# Patient Record
Sex: FEMALE | Race: WHITE | NOT HISPANIC OR LATINO | Employment: OTHER | URBAN - METROPOLITAN AREA
[De-identification: names, ages, dates, MRNs, and addresses within clinical notes are randomized per-mention and may not be internally consistent; named-entity substitution may affect disease eponyms.]

---

## 2017-01-16 ENCOUNTER — HOSPITAL ENCOUNTER (OUTPATIENT)
Dept: RADIOLOGY | Facility: MEDICAL CENTER | Age: 67
End: 2017-01-16
Attending: NURSE PRACTITIONER
Payer: MEDICARE

## 2017-01-16 DIAGNOSIS — M81.0 OSTEOPOROSIS: ICD-10-CM

## 2017-01-16 DIAGNOSIS — Z12.31 VISIT FOR SCREENING MAMMOGRAM: ICD-10-CM

## 2017-01-16 PROCEDURE — 77080 DXA BONE DENSITY AXIAL: CPT

## 2017-01-16 PROCEDURE — 77063 BREAST TOMOSYNTHESIS BI: CPT

## 2017-01-17 ENCOUNTER — PATIENT MESSAGE (OUTPATIENT)
Dept: MEDICAL GROUP | Facility: PHYSICIAN GROUP | Age: 67
End: 2017-01-17

## 2017-01-18 ENCOUNTER — PATIENT MESSAGE (OUTPATIENT)
Dept: MEDICAL GROUP | Facility: PHYSICIAN GROUP | Age: 67
End: 2017-01-18

## 2017-01-18 DIAGNOSIS — R06.83 SNORING: ICD-10-CM

## 2017-01-18 NOTE — TELEPHONE ENCOUNTER
From: Cheryl Natarajan  To: BRANDO Garrido  Sent: 1/18/2017 7:55 AM PST  Subject: Non-Urgent Medical Question    Yes please, I'm not sleeping well. all I take is Melatonin, and am told I snore.  Thank youCheryl  ----- Message -----  From: BRANDO Garrido  Sent: 1/18/2017 7:15 AM PST  To: Cheryl Natarajan  Subject: RE: Non-Urgent Medical Question    We would need to put in a referral to pulmonology. They would see you and then order the study. Let me know if you would like me to do that.  BRANDO Garrido      ----- Message -----   From: CHERYL NATARAJAN   Sent: 1/17/2017 2:11 PM PST   To: BRANDO Garrido  Subject: Non-Urgent Medical Question    How would I go about getting a sleep study? Do you need to order that for me?  Thank you,  Cheryl

## 2017-02-03 ENCOUNTER — OFFICE VISIT (OUTPATIENT)
Dept: URGENT CARE | Facility: PHYSICIAN GROUP | Age: 67
End: 2017-02-03
Payer: MEDICARE

## 2017-02-03 VITALS
SYSTOLIC BLOOD PRESSURE: 132 MMHG | DIASTOLIC BLOOD PRESSURE: 70 MMHG | WEIGHT: 215 LBS | BODY MASS INDEX: 31.74 KG/M2 | OXYGEN SATURATION: 98 % | HEART RATE: 108 BPM | TEMPERATURE: 97.8 F

## 2017-02-03 DIAGNOSIS — M54.50 ACUTE RIGHT-SIDED LOW BACK PAIN WITHOUT SCIATICA: ICD-10-CM

## 2017-02-03 PROCEDURE — 1036F TOBACCO NON-USER: CPT | Performed by: NURSE PRACTITIONER

## 2017-02-03 PROCEDURE — 3017F COLORECTAL CA SCREEN DOC REV: CPT | Performed by: NURSE PRACTITIONER

## 2017-02-03 PROCEDURE — 99213 OFFICE O/P EST LOW 20 MIN: CPT | Performed by: NURSE PRACTITIONER

## 2017-02-03 PROCEDURE — G8419 CALC BMI OUT NRM PARAM NOF/U: HCPCS | Performed by: NURSE PRACTITIONER

## 2017-02-03 PROCEDURE — 4040F PNEUMOC VAC/ADMIN/RCVD: CPT | Performed by: NURSE PRACTITIONER

## 2017-02-03 PROCEDURE — G8482 FLU IMMUNIZE ORDER/ADMIN: HCPCS | Performed by: NURSE PRACTITIONER

## 2017-02-03 PROCEDURE — 1101F PT FALLS ASSESS-DOCD LE1/YR: CPT | Performed by: NURSE PRACTITIONER

## 2017-02-03 PROCEDURE — G8432 DEP SCR NOT DOC, RNG: HCPCS | Performed by: NURSE PRACTITIONER

## 2017-02-03 PROCEDURE — 3014F SCREEN MAMMO DOC REV: CPT | Performed by: NURSE PRACTITIONER

## 2017-02-03 ASSESSMENT — ENCOUNTER SYMPTOMS
NECK PAIN: 0
BACK PAIN: 1
WEAKNESS: 0
CHILLS: 0
TINGLING: 0
MYALGIAS: 0
BOWEL INCONTINENCE: 0
LEG PAIN: 0
NUMBNESS: 1
PARESTHESIAS: 0
PERIANAL NUMBNESS: 0
FEVER: 0
FALLS: 0
ABDOMINAL PAIN: 0

## 2017-02-03 NOTE — MR AVS SNAPSHOT
Betty Natarajan   2/3/2017 2:30 PM   Office Visit   MRN: [id number]    Department:  [facility]   Dept Phone:  [phone]    Description:  Female : 1950   Provider:  Cathey J Hamman, A.P.N.           Reason for Visit     Back Pain lower back pain, x3days. had blood work done and kidney function was low       Allergies as of 2/3/2017     Allergen Noted Reactions    Pcn [Penicillins] 2009         You were diagnosed with     Acute right-sided low back pain without sciatica   [[id number]]         Vital Signs     Blood Pressure Pulse Temperature Weight Oxygen Saturation Smoking Status    132/70 mmHg 108 36.6 °C (97.8 °F) 97.523 kg (215 lb) 98% Never Smoker       Basic Information     Date Of Birth Sex Race Ethnicity Preferred Language    1950 Female White Non- English      Your appointments     Mar 17, 2017 10:40 AM   New Patient with C Rotation   [facility] (--)    [address]  [facility] [id number] [phone]           Please bring enclosed paperwork completed along with your insurance card and photo ID.              Problem List              ICD-10-CM Priority Class Noted - Resolved    Vitamin D deficiency E55.9   2010 - Present    Mild intermittent asthma without complication J45.20   2013 - Present    Vitamin D deficiency E55.9   10/29/2014 - Present    Osteoporosis M81.0   12/15/2015 - Present    Obesity (BMI 30-39.9) E66.9   2016 - Present      Health Maintenance        Date Due Completion Dates    PAP SMEAR 10/28/2017 10/28/2014, 2013, 2012, 2012, 2011, 2010    MAMMOGRAM 2018, 2016, 2016, 2013, 10/29/2012, 10/25/2011, 2010, 2010, 2009, 2009, 2008, 2008, 2007, 2006, 10/19/2005, 3/2/2005, 2005    BONE DENSITY 2018, 2016, 2015 (Done), 10/29/2012, 2010    Override on 2015: Done ([facility])    COLONOSCOPY 2/17/2021 2/17/2016    IMM DTaP/Tdap/Td Vaccine (2 - Td) 8/17/2021 8/17/2011, 10/15/2006            Current Immunizations     13-VALENT PCV PREVNAR 12/15/2015    Influenza TIV (IM) 11/29/2012    Influenza Vaccine 12/21/2009    Influenza Vaccine Adult HD 11/16/2016, 12/15/2015    Influenza Vaccine Quad Inj (Pf) 10/28/2014    Pneumococcal Vaccine (UF)Historical Data 12/21/2009    Pneumococcal polysaccharide vaccine (PPSV-23) 10/28/2014    SHINGLES VACCINE 10/28/2014    TD Vaccine 10/15/2006    Tdap Vaccine 8/17/2011      Below and/or attached are the medications your provider expects you to take. Review all of your home medications and newly ordered medications with your provider and/or pharmacist. Follow medication instructions as directed by your provider and/or pharmacist. Please keep your medication list with you and share with your provider. Update the information when medications are discontinued, doses are changed, or new medications (including over-the-counter products) are added; and carry medication information at all times in the event of emergency situations     Allergies:  PCN - (reactions not documented)               Medications  Valid as of: February 03, 2017 -  6:14 PM    Generic Name Brand Name Tablet Size Instructions for use    Albuterol Sulfate (Aero Soln) albuterol 108 (90 BASE) MCG/ACT Inhale 2 Puffs by mouth every 6 hours as needed for Shortness of Breath.        Albuterol Sulfate (Aero Soln) VENTOLIN  (90 BASE) MCG/ACT INHALE 2 PUFFS BY MOUTH EVERY 6 HOURS AS NEEDED FOR SHORTNESS OF BREATH.        Alendronate Sodium (Tab) FOSAMAX 70 MG Take 1 Tab by mouth every 7 days.        Cholecalciferol (Tab) cholecalciferol 1000 UNIT Take 1,000 Units by mouth every day.        Ergocalciferol (Cap) DRISDOL 09125 UNITS Take 1 Cap by mouth every 7 days.        Furosemide (Tab) LASIX 20 MG Take 0.5 Tabs by mouth 2 times a day.        Hydrocod Polst-Chlorphen Polst (Liquid  CR) TUSSIONEX 10-8 MG/5ML Take 5 mL by mouth every 12 hours.        Hydrocodone-Acetaminophen (Tab) NORCO  MG Take 1 Tab by mouth every 6 hours as needed for Mild Pain. No driving or operation of machinery after taking this medication.        Mometasone Furo-Formoterol Fum (Aerosol) Mometasone Furo-Formoterol Fum 200-5 MCG/ACT Inhale  by mouth.        Sertraline HCl (Tab) ZOLOFT 50 MG Take 1 Tab by mouth every day.        ValACYclovir HCl (Tab) VALTREX 1 GM Take 1 Tab by mouth 3 times a day.        .                 Medicines prescribed today were sent to:     [id number], [place] - [address] [place] [address]    Phone: [phone] Fax: [phone]    Open 24 Hours?: No      Medication refill instructions:       If your prescription bottle indicates you have medication refills left, it is not necessary to call your provider’s office. Please contact your pharmacy and they will refill your medication.    If your prescription bottle indicates you do not have any refills left, you may request refills at any time through one of the following ways: The online [employer] system (except Urgent Care), by calling your provider’s office, or by asking your pharmacy to contact your provider’s office with a refill request. Medication refills are processed only during regular business hours and may not be available until the next business day. Your provider may request additional information or to have a follow-up visit with you prior to refilling your medication.   *Please Note: Medication refills are assigned a new Rx number when refilled electronically. Your pharmacy may indicate that no refills were authorized even though a new prescription for the same medication is available at the pharmacy. Please request the medicine by name with the pharmacy before contacting your provider for a refill.           [employer] Access Code: Activation code not generated  Current [employer]  Status: Active

## 2017-02-03 NOTE — PROGRESS NOTES
Subjective:      Betty Natarajan is a 66 y.o. female who presents with Back Pain    Past Medical History   Diagnosis Date   • Depression    • Asthma    • Pap smear      Social History     Social History   • Marital Status:      Spouse Name: N/A   • Number of Children: N/A   • Years of Education: N/A     Occupational History   • retired      Social History Main Topics   • Smoking status: Never Smoker    • Smokeless tobacco: Never Used   • Alcohol Use: 0.0 oz/week     0 Standard drinks or equivalent per week      Comment: occasional   • Drug Use: No   • Sexual Activity: Not on file     Other Topics Concern   • Not on file     Social History Narrative     Family History   Problem Relation Age of Onset   • Diabetes Father    • Hypertension Father    • Genitourinary () Father      renal failure on dialysis   • Cancer Brother      brain tumor   • Cancer Maternal Aunt      Allergies: Pcn    This patient is a 66-year-old female who presents today with complaint of right lower back pain over the last 3 days. Patient is concerned because she is under the impression that her renal function is low. She is concerned about the area of pain and is worried about a problem possibly with her kidney. Patient states that her pain is reproduced with certain ranges of motion including getting in and out of the car and also laying down. She denies any numbness or tingling or radiation of pain into the legs.        Back Pain  This is a new problem. The current episode started in the past 7 days. The problem occurs constantly. The problem is unchanged. Pain location: right lower back. The quality of the pain is described as aching. The pain does not radiate. The pain is moderate. The pain is worse during the night. The symptoms are aggravated by position. Stiffness is present all day. Associated symptoms include numbness. Pertinent negatives include no abdominal pain, bladder incontinence, bowel incontinence, fever, leg pain,  paresthesias, pelvic pain, perianal numbness, tingling or weakness. She has tried nothing for the symptoms. The treatment provided no relief.       Review of Systems   Constitutional: Negative for fever and chills.   Gastrointestinal: Negative for abdominal pain and bowel incontinence.   Genitourinary: Negative for bladder incontinence and pelvic pain.   Musculoskeletal: Positive for back pain. Negative for myalgias, joint pain, falls and neck pain.   Skin: Negative.    Neurological: Positive for numbness. Negative for tingling, weakness and paresthesias.     All other systems reviewed and are negative      Objective:     /70 mmHg  Pulse 108  Temp(Src) 36.6 °C (97.8 °F)  Wt 97.523 kg (215 lb)  SpO2 98%     Physical Exam   Constitutional: She is oriented to person, place, and time. She appears well-developed and well-nourished. No distress.   Musculoskeletal:        Lumbar back: She exhibits decreased range of motion, tenderness, pain and spasm.        Back:    There is no point tenderness to the right lower back. Pain is reproduced to the right lower back with straight leg raise on the left side. Straight leg raise on the right side is negative.   Neurological: She is alert and oriented to person, place, and time.   Skin: Skin is warm and dry. She is not diaphoretic.   Psychiatric: She has a normal mood and affect. Her behavior is normal.   Vitals reviewed.    UA: trace leukocytes; patient denies urgency, frequency or dysuria.               Assessment/Plan:   Lower back pain  Low back strain  -ibuprofen  -Declined RX for muscle relaxer   -ice/heat; always end with ice  -rest  -follow up if sypmtoms persist or worsen   There are no diagnoses linked to this encounter.

## 2017-03-17 ENCOUNTER — SLEEP CENTER VISIT (OUTPATIENT)
Dept: SLEEP MEDICINE | Facility: MEDICAL CENTER | Age: 67
End: 2017-03-17
Payer: MEDICARE

## 2017-03-17 VITALS
BODY MASS INDEX: 34.66 KG/M2 | HEIGHT: 69 IN | RESPIRATION RATE: 16 BRPM | OXYGEN SATURATION: 95 % | HEART RATE: 59 BPM | DIASTOLIC BLOOD PRESSURE: 80 MMHG | WEIGHT: 234 LBS | TEMPERATURE: 98.2 F | SYSTOLIC BLOOD PRESSURE: 112 MMHG

## 2017-03-17 DIAGNOSIS — J45.20 MILD INTERMITTENT ASTHMA WITHOUT COMPLICATION: ICD-10-CM

## 2017-03-17 DIAGNOSIS — G47.33 OBSTRUCTIVE SLEEP APNEA: ICD-10-CM

## 2017-03-17 PROCEDURE — 99202 OFFICE O/P NEW SF 15 MIN: CPT | Performed by: INTERNAL MEDICINE

## 2017-03-17 RX ORDER — OMEGA-3/DHA/EPA/FISH OIL 1000 MG
CAPSULE ORAL DAILY
COMMUNITY
Start: 2017-03-17

## 2017-03-17 RX ORDER — ZOLPIDEM TARTRATE 5 MG/1
5 TABLET ORAL NIGHTLY PRN
Qty: 3 TAB | Refills: 0 | Status: SHIPPED | OUTPATIENT
Start: 2017-03-17 | End: 2017-05-17

## 2017-03-17 NOTE — MR AVS SNAPSHOT
"Betty Natarajan   3/17/2017 10:40 AM   Sleep Center Visit   MRN: [id number]    Department:  [facility]   Dept Phone:  [phone]    Description:  Female : 1950   Provider:  Yogi Ohara M.D.           Reason for Visit     New Patient Evaluate STEVEN      Allergies as of 3/17/2017     Allergen Noted Reactions    Pcn [Penicillins] 2009         You were diagnosed with     Obstructive sleep apnea   [[id number]]       Mild intermittent asthma without complication   [656612]       BMI 34.0-34.9,adult   [072906]         Vital Signs     Blood Pressure Pulse Temperature Respirations Height Weight    112/80 mmHg 59 36.8 °C (98.2 °F) 16 1.753 m (5' 9.02\") 106.142 kg (234 lb)    Body Mass Index Oxygen Saturation Smoking Status             34.54 kg/m2 95% Passive Smoke Exposure - Never Smoker         Basic Information     Date Of Birth Sex Race Ethnicity Preferred Language    1950 Female White Non- English      Your appointments     2017  7:10 PM   Sleep Study Diagnostic with SLEEP TECH   [facility] (--)    [address]  [place] [id number] [phone]            May 05, 2017 10:40 AM   Follow UP with BRANDO Aceves   [facility] (--)    [address]  [place] [id number] [phone]              Problem List              ICD-10-CM Priority Class Noted - Resolved    Vitamin D deficiency E55.9   2010 - Present    Mild intermittent asthma without complication J45.20   2013 - Present    Vitamin D deficiency E55.9   10/29/2014 - Present    Osteoporosis M81.0   12/15/2015 - Present    Obesity (BMI 30-39.9) E66.9   2016 - Present      Health Maintenance        Date Due Completion Dates    PAP SMEAR 10/28/2017 10/28/2014, 2013, 2012, 2012, 2011, 2010    MAMMOGRAM 2018, 2016, 2016, 2013, 10/29/2012, 10/25/2011, 2010, " 8/9/2010, 4/23/2009, 4/23/2009, 4/21/2008, 4/21/2008, 4/20/2007, 4/19/2006, 10/19/2005, 3/2/2005, 2/18/2005    BONE DENSITY 1/16/2018 1/16/2017, 1/6/2016, 1/30/2015 (Done), 10/29/2012, 8/9/2010    Override on 1/30/2015: Done ([facility])    COLONOSCOPY 2/17/2021 2/17/2016    IMM DTaP/Tdap/Td Vaccine (2 - Td) 8/17/2021 8/17/2011, 10/15/2006            Current Immunizations     13-VALENT PCV PREVNAR 12/15/2015    Influenza TIV (IM) 11/29/2012    Influenza Vaccine 12/21/2009    Influenza Vaccine Adult HD 11/16/2016, 12/15/2015    Influenza Vaccine Quad Inj (Pf) 10/28/2014    Pneumococcal Vaccine (UF)Historical Data 12/21/2009    Pneumococcal polysaccharide vaccine (PPSV-23) 10/28/2014    SHINGLES VACCINE 10/28/2014    TD Vaccine 10/15/2006    Tdap Vaccine 8/17/2011      Below and/or attached are the medications your provider expects you to take. Review all of your home medications and newly ordered medications with your provider and/or pharmacist. Follow medication instructions as directed by your provider and/or pharmacist. Please keep your medication list with you and share with your provider. Update the information when medications are discontinued, doses are changed, or new medications (including over-the-counter products) are added; and carry medication information at all times in the event of emergency situations     Allergies:  PCN - (reactions not documented)               Medications  Valid as of: March 17, 2017 - 11:16 AM    Generic Name Brand Name Tablet Size Instructions for use    Albuterol Sulfate (Aero Soln) albuterol 108 (90 BASE) MCG/ACT Inhale 2 Puffs by mouth every 6 hours as needed for Shortness of Breath.        Albuterol Sulfate (Aero Soln) VENTOLIN  (90 BASE) MCG/ACT INHALE 2 PUFFS BY MOUTH EVERY 6 HOURS AS NEEDED FOR SHORTNESS OF BREATH.        Alendronate Sodium (Tab) FOSAMAX 70 MG Take 1 Tab by mouth every 7 days.        Cholecalciferol (Tab) cholecalciferol 1000 UNIT  Take 1,000 Units by mouth every day.        Ergocalciferol (Cap) DRISDOL 90910 UNITS Take 1 Cap by mouth every 7 days.        Furosemide (Tab) LASIX 20 MG Take 0.5 Tabs by mouth 2 times a day.        Hydrocod Polst-Chlorphen Polst (Liquid CR) TUSSIONEX 10-8 MG/5ML Take 5 mL by mouth every 12 hours.        Hydrocodone-Acetaminophen (Tab) NORCO  MG Take 1 Tab by mouth every 6 hours as needed for Mild Pain. No driving or operation of machinery after taking this medication.        Melatonin (Cap) Melatonin 5 MG Take  by mouth at bedtime as needed.        Mometasone Furo-Formoterol Fum (Aerosol) Mometasone Furo-Formoterol Fum 200-5 MCG/ACT Inhale  by mouth.        Probiotic Product (Cap) PROBIOTIC ACIDOPHILUS  Take  by mouth every day.        Sertraline HCl (Tab) ZOLOFT 50 MG Take 1 Tab by mouth every day.        ValACYclovir HCl (Tab) VALTREX 1 GM Take 1 Tab by mouth 3 times a day.        Zolpidem Tartrate (Tab) AMBIEN 5 MG Take 1 Tab by mouth at bedtime as needed for Sleep (1 to 3 po qhs prn insomnia/sleep study. Bring to sleep study.).        .                 Medicines prescribed today were sent to:     [facility] # [address] [place] [address], [place] - [address] [place] [address]    Phone: [phone] Fax: [phone]    Open 24 Hours?: No      Medication refill instructions:       If your prescription bottle indicates you have medication refills left, it is not necessary to call your provider’s office. Please contact your pharmacy and they will refill your medication.    If your prescription bottle indicates you do not have any refills left, you may request refills at any time through one of the following ways: The online [employer] system (except Urgent Care), by calling your provider’s office, or by asking your pharmacy to contact your provider’s office with a refill request. Medication refills are processed only during regular business hours and may not be available until the next  business day. Your provider may request additional information or to have a follow-up visit with you prior to refilling your medication.   *Please Note: Medication refills are assigned a new Rx number when refilled electronically. Your pharmacy may indicate that no refills were authorized even though a new prescription for the same medication is available at the pharmacy. Please request the medicine by name with the pharmacy before contacting your provider for a refill.        Your To Do List     Future Labs/Procedures Complete By Expires    POLYSOMNOGRAPHY, 4 OR MORE  As directed 3/17/2018         [employer] Access Code: Activation code not generated  Current [employer] Status: Active

## 2017-03-17 NOTE — PROGRESS NOTES
"Chief Complaint   Patient presents with   • New Patient     Evaluate STEVEN       HPI:  The patient is a 66-year-old woman who presents for evaluation of possible obstructive sleep apnea. She does admit to sometimes \"dragging\" during the day. She has been told that she snores significantly. No choking during sleep. She is not sure if she has ever had apneic episodes. She does not feel well rested after sleep. Her Wiggins sleepiness score is 13. There is nothing to suggest narcolepsy or movement disorder. She does have mild asthma is under good control.    Past Medical History   Diagnosis Date   • Depression    • Asthma    • Pap smear    • Bronchitis    • Positive PPD    • Influenza    • Tonsillitis        ROS:   Constitutional: Denies fevers, chills, night sweats, fatigue or weight loss  Eyes: Denies vision loss, pain, drainage, double vision  Ears, Nose, Throat: Denies earache, tinnitus, hoarseness  Cardiovascular: Denies chest pain, tightness, palpitations  Respiratory: Denies shortness of breath, sputum production, cough, hemoptysis  Sleep: See history of present illness  GI: Denies abdominal pain, nausea, vomiting, diarrhea  : Denies frequent urination, hematuria, painful urination  Musculoskeletal: Denies back pain, painful joints, sore muscles  Neurological: Denies headaches, seizures  Skin: Denies rashes, color changes  Psychiatric: Denies depression or thoughts of suicide  Hematologic: Denies bleeding tendency or clotting tendency  Allergic/Immunologic: Denies rhinitis, skin sensitivity    Social History     Social History   • Marital Status:      Spouse Name: N/A   • Number of Children: N/A   • Years of Education: N/A     Occupational History   • retired      Social History Main Topics   • Smoking status: Passive Smoke Exposure - Never Smoker   • Smokeless tobacco: Never Used   • Alcohol Use: 0.0 oz/week     0 Standard drinks or equivalent per week      Comment: occasional   • Drug Use: No   • Sexual " "Activity: Not on file     Other Topics Concern   • Not on file     Social History Narrative     Pcn  Current Outpatient Prescriptions on File Prior to Visit   Medication Sig Dispense Refill   • Mometasone Furo-Formoterol Fum (DULERA) 200-5 MCG/ACT Aerosol Inhale  by mouth.     • vitamin D (CHOLECALCIFEROL) 1000 UNIT TABS Take 1,000 Units by mouth every day.     • albuterol (VENTOLIN OR PROVENTIL) 108 (90 BASE) MCG/ACT AERS Inhale 2 Puffs by mouth every 6 hours as needed for Shortness of Breath. 1 Inhaler 3   • furosemide (LASIX) 20 MG Tab Take 0.5 Tabs by mouth 2 times a day. (Patient not taking: Reported on 3/17/2017) 15 Tab 0   • sertraline (ZOLOFT) 50 MG Tab Take 1 Tab by mouth every day. (Patient not taking: Reported on 3/17/2017) 30 Tab 5   • alendronate (FOSAMAX) 70 MG Tab Take 1 Tab by mouth every 7 days. (Patient not taking: Reported on 3/17/2017) 4 Tab 11   • hydrocod polst-chlorphen polst (TUSSIONEX PENNKINETIC ER) 10-8 MG/5ML LQCR Take 5 mL by mouth every 12 hours. (Patient not taking: Reported on 3/2/2016) 140 mL 0   • vitamin D, Ergocalciferol, (DRISDOL) 54159 UNITS CAPS capsule Take 1 Cap by mouth every 7 days. (Patient not taking: Reported on 3/17/2017) 4 Cap 2   • valacyclovir (VALTREX) 1 GM TABS Take 1 Tab by mouth 3 times a day. (Patient not taking: Reported on 3/2/2016) 21 Each 0   • hydrocodone/acetaminophen (NORCO)  MG TABS Take 1 Tab by mouth every 6 hours as needed for Mild Pain. No driving or operation of machinery after taking this medication. (Patient not taking: Reported on 3/2/2016) 15 Each 0   • VENTOLIN  (90 BASE) MCG/ACT AERS INHALE 2 PUFFS BY MOUTH EVERY 6 HOURS AS NEEDED FOR SHORTNESS OF BREATH. (Patient not taking: Reported on 3/17/2017) 18 g 3     No current facility-administered medications on file prior to visit.     Blood pressure 112/80, pulse 59, temperature 36.8 °C (98.2 °F), resp. rate 16, height 1.753 m (5' 9.02\"), weight 106.142 kg (234 lb), SpO2 95 %.  Family " History   Problem Relation Age of Onset   • Diabetes Father    • Hypertension Father    • Genitourinary () Father      renal failure on dialysis   • Cancer Brother      brain tumor   • Cancer Maternal Aunt    • Breast Cancer Daughter    • Sleep Apnea Neg Hx    • Heart Attack Brother    • No Known Problems Brother    • No Known Problems Brother        Physical Exam:    HEENT: PERRLA, EOMI, no scleral icterus, no nasal or oral lesions  Neck: No thyromegaly, no adenopathy, no bruits  Mallampatti: Grade IV  Lungs: Equal breath sounds, no wheezes or crackles  Heart: Regular rate and rhythm, no gallops or murmurs  Abdomen: Soft, benign, no organomegaly  Extremities: No clubbing, cyanosis, or edema  Neurologic: Cranial nerve, motor, and sensory exam are normal    1. Obstructive sleep apnea    2. Mild intermittent asthma without complication    3. BMI 34.0-34.9,adult        The patient has a constellation of signs and symptoms consistent with the diagnosis of obstructive sleep apnea with poor sleep quality, snoring, and excessive daytime somnolence.  The patient is a candidate for polysomnography. Testing will be arranged. We discussed the pathophysiology of obstructive sleep apnea, various therapies, and the nature of the split-night polysomnogram. The patient will use Ambien on the night of the study.  We will see the patient afterwards to review results and suggest any possible therapy.

## 2017-04-01 ENCOUNTER — OFFICE VISIT (OUTPATIENT)
Dept: URGENT CARE | Facility: PHYSICIAN GROUP | Age: 67
End: 2017-04-01
Payer: MEDICARE

## 2017-04-01 ENCOUNTER — HOSPITAL ENCOUNTER (OUTPATIENT)
Dept: RADIOLOGY | Facility: MEDICAL CENTER | Age: 67
End: 2017-04-01
Attending: NURSE PRACTITIONER
Payer: MEDICARE

## 2017-04-01 VITALS
TEMPERATURE: 97.9 F | DIASTOLIC BLOOD PRESSURE: 80 MMHG | RESPIRATION RATE: 16 BRPM | OXYGEN SATURATION: 99 % | WEIGHT: 215 LBS | BODY MASS INDEX: 31.84 KG/M2 | SYSTOLIC BLOOD PRESSURE: 148 MMHG | HEIGHT: 69 IN | HEART RATE: 88 BPM

## 2017-04-01 DIAGNOSIS — R05.8 NONPRODUCTIVE COUGH: ICD-10-CM

## 2017-04-01 DIAGNOSIS — J45.31 MILD PERSISTENT ASTHMA WITH ACUTE EXACERBATION: ICD-10-CM

## 2017-04-01 DIAGNOSIS — J20.9 ACUTE BRONCHITIS, UNSPECIFIED ORGANISM: ICD-10-CM

## 2017-04-01 PROCEDURE — 99214 OFFICE O/P EST MOD 30 MIN: CPT | Performed by: NURSE PRACTITIONER

## 2017-04-01 PROCEDURE — 3014F SCREEN MAMMO DOC REV: CPT | Performed by: NURSE PRACTITIONER

## 2017-04-01 PROCEDURE — 1101F PT FALLS ASSESS-DOCD LE1/YR: CPT | Mod: 8P | Performed by: NURSE PRACTITIONER

## 2017-04-01 PROCEDURE — G8419 CALC BMI OUT NRM PARAM NOF/U: HCPCS | Performed by: NURSE PRACTITIONER

## 2017-04-01 PROCEDURE — G8432 DEP SCR NOT DOC, RNG: HCPCS | Performed by: NURSE PRACTITIONER

## 2017-04-01 PROCEDURE — 1036F TOBACCO NON-USER: CPT | Performed by: NURSE PRACTITIONER

## 2017-04-01 PROCEDURE — 4040F PNEUMOC VAC/ADMIN/RCVD: CPT | Performed by: NURSE PRACTITIONER

## 2017-04-01 PROCEDURE — 71020 DX-CHEST-2 VIEWS: CPT

## 2017-04-01 RX ORDER — IPRATROPIUM BROMIDE AND ALBUTEROL SULFATE 2.5; .5 MG/3ML; MG/3ML
3 SOLUTION RESPIRATORY (INHALATION) ONCE
Status: COMPLETED | OUTPATIENT
Start: 2017-04-01 | End: 2017-04-01

## 2017-04-01 RX ORDER — DOXYCYCLINE HYCLATE 100 MG
100 TABLET ORAL 2 TIMES DAILY
Qty: 14 TAB | Refills: 0 | Status: SHIPPED | OUTPATIENT
Start: 2017-04-01 | End: 2017-05-05

## 2017-04-01 RX ORDER — AZITHROMYCIN 250 MG/1
TABLET, FILM COATED ORAL
Qty: 6 TAB | Refills: 0 | Status: CANCELLED | OUTPATIENT
Start: 2017-04-01

## 2017-04-01 RX ADMIN — IPRATROPIUM BROMIDE AND ALBUTEROL SULFATE 3 ML: 2.5; .5 SOLUTION RESPIRATORY (INHALATION) at 11:21

## 2017-04-01 ASSESSMENT — ENCOUNTER SYMPTOMS
PALPITATIONS: 0
ORTHOPNEA: 0
DIARRHEA: 0
SORE THROAT: 0
ABDOMINAL PAIN: 0
SPUTUM PRODUCTION: 1
NAUSEA: 0
VOMITING: 0
COUGH: 1
DIZZINESS: 0
WEAKNESS: 0
SHORTNESS OF BREATH: 0
CONSTIPATION: 0
CHILLS: 0
HEADACHES: 0
FEVER: 0
BACK PAIN: 0
WHEEZING: 1
MYALGIAS: 0

## 2017-04-01 NOTE — PROGRESS NOTES
Subjective:      Betty Natarajan is a 66 y.o. female who presents with Cough            Cough  Associated symptoms include wheezing. Pertinent negatives include no chest pain, chills, ear pain, fever, headaches, myalgias, sore throat or shortness of breath. There is no history of environmental allergies.   Betty is a 66 year old female who is here for cough x 2 weeks. Denies fever. C/o intermittent productive cough. Using inhaler 2x/day, has not used it in last year. Denies sore throat but has slight nasal congestion with PND. States chest tightness and wheeze, last inhaler use last night. No cough syrup, no nasal flushing.    PMH:  has a past medical history of Depression; Asthma; Pap smear; Bronchitis; Positive PPD; Influenza; and Tonsillitis.  MEDS:   Current outpatient prescriptions:   •  Melatonin 5 MG Cap, Take  by mouth at bedtime as needed., Disp: , Rfl:   •  Probiotic Product (PROBIOTIC ACIDOPHILUS) Cap, Take  by mouth every day., Disp: , Rfl:   •  zolpidem (AMBIEN) 5 MG Tab, Take 1 Tab by mouth at bedtime as needed for Sleep (1 to 3 po qhs prn insomnia/sleep study. Bring to sleep study.)., Disp: 3 Tab, Rfl: 0  •  Mometasone Furo-Formoterol Fum (DULERA) 200-5 MCG/ACT Aerosol, Inhale  by mouth., Disp: , Rfl:   •  furosemide (LASIX) 20 MG Tab, Take 0.5 Tabs by mouth 2 times a day. (Patient not taking: Reported on 3/17/2017), Disp: 15 Tab, Rfl: 0  •  sertraline (ZOLOFT) 50 MG Tab, Take 1 Tab by mouth every day. (Patient not taking: Reported on 3/17/2017), Disp: 30 Tab, Rfl: 5  •  alendronate (FOSAMAX) 70 MG Tab, Take 1 Tab by mouth every 7 days. (Patient not taking: Reported on 3/17/2017), Disp: 4 Tab, Rfl: 11  •  hydrocod polst-chlorphen polst (TUSSIONEX PENNKINETIC ER) 10-8 MG/5ML LQCR, Take 5 mL by mouth every 12 hours. (Patient not taking: Reported on 3/2/2016), Disp: 140 mL, Rfl: 0  •  vitamin D, Ergocalciferol, (DRISDOL) 61885 UNITS CAPS capsule, Take 1 Cap by mouth every 7 days. (Patient not taking:  Reported on 3/17/2017), Disp: 4 Cap, Rfl: 2  •  valacyclovir (VALTREX) 1 GM TABS, Take 1 Tab by mouth 3 times a day. (Patient not taking: Reported on 3/2/2016), Disp: 21 Each, Rfl: 0  •  hydrocodone/acetaminophen (NORCO)  MG TABS, Take 1 Tab by mouth every 6 hours as needed for Mild Pain. No driving or operation of machinery after taking this medication. (Patient not taking: Reported on 3/2/2016), Disp: 15 Each, Rfl: 0  •  VENTOLIN  (90 BASE) MCG/ACT AERS, INHALE 2 PUFFS BY MOUTH EVERY 6 HOURS AS NEEDED FOR SHORTNESS OF BREATH. (Patient not taking: Reported on 3/17/2017), Disp: 18 g, Rfl: 3  •  vitamin D (CHOLECALCIFEROL) 1000 UNIT TABS, Take 1,000 Units by mouth every day., Disp: , Rfl:   •  albuterol (VENTOLIN OR PROVENTIL) 108 (90 BASE) MCG/ACT AERS, Inhale 2 Puffs by mouth every 6 hours as needed for Shortness of Breath., Disp: 1 Inhaler, Rfl: 3    Current facility-administered medications:   •  ipratropium-albuterol (DUONEB) nebulizer solution 3 mL, 3 mL, Nebulization, Once, Gema Lira, F.N.P.  ALLERGIES:   Allergies   Allergen Reactions   • Pcn [Penicillins]      SURGHX:   Past Surgical History   Procedure Laterality Date   • Hemorrhoidectomy  5/08   • Nodule excision       Performed by EDELMIRA MONTE at SURGERY [facility] ORS   • Gastric bypass laparoscopic  2001   • Colonoscopy  2005     normal   • Us-needle core bx-breast panel     • Colonoscopy  2/17/2016     internal hemorrhoids   • Pr remv 2nd cataract,corn-scler sectn     • Tonsillectomy       SOCHX:  reports that she has been passively smoking.  She has never used smokeless tobacco. She reports that she drinks alcohol. She reports that she does not use illicit drugs.  FH: Family history was reviewed, no pertinent findings to report      Review of Systems   Constitutional: Negative for fever, chills and malaise/fatigue.   HENT: Positive for congestion. Negative for ear pain and sore throat.    Respiratory: Positive for cough, sputum  "production and wheezing. Negative for shortness of breath.    Cardiovascular: Negative for chest pain, palpitations and orthopnea.   Gastrointestinal: Negative for nausea, vomiting, abdominal pain, diarrhea and constipation.   Musculoskeletal: Negative for myalgias and back pain.   Neurological: Negative for dizziness, weakness and headaches.   Endo/Heme/Allergies: Negative for environmental allergies.   All other systems reviewed and are negative.         Objective:     /80 mmHg  Pulse 88  Temp(Src) 36.6 °C (97.9 °F)  Resp 16  Ht 1.753 m (5' 9\")  Wt 97.523 kg (215 lb)  BMI 31.74 kg/m2  SpO2 95%     Physical Exam   Constitutional: She is oriented to person, place, and time. Vital signs are normal. She appears well-developed and well-nourished. She is cooperative.  Non-toxic appearance. She does not have a sickly appearance. She does not appear ill. No distress.   HENT:   Head: Normocephalic.   Right Ear: Hearing, tympanic membrane, external ear and ear canal normal.   Left Ear: Hearing, tympanic membrane, external ear and ear canal normal.   Nose: No mucosal edema, rhinorrhea or sinus tenderness.   Mouth/Throat: Uvula is midline and mucous membranes are normal. No uvula swelling. Posterior oropharyngeal erythema present.   Eyes: Conjunctivae and EOM are normal. Pupils are equal, round, and reactive to light.   Neck: Normal range of motion. Neck supple.   Cardiovascular: Normal rate and regular rhythm.    Pulmonary/Chest: Effort normal. No accessory muscle usage. No respiratory distress. She has no decreased breath sounds. She has wheezes in the right upper field, the right middle field and the right lower field. She has no rhonchi. She has no rales.   Musculoskeletal: Normal range of motion.   Lymphadenopathy:     She has no cervical adenopathy.   Neurological: She is alert and oriented to person, place, and time.   Skin: Skin is warm and dry. She is not diaphoretic.   Vitals reviewed.  Duo Neb " breathing treatment: Patient has chest tightness, wheeze without SOB or CP. Cough induced especially with deep breathing. After, patient states able to breathe deep without coughing. Air movement throughout. Post tx 99%.    CXR:      FINDINGS:  Cardiomediastinal contours are normal.  No pulmonary consolidation is seen.  No pleural space process is evident.                  Assessment/Plan:     1. Nonproductive cough    - ipratropium-albuterol (DUONEB) nebulizer solution 3 mL; 3 mL by Nebulization route Once.  - DX-CHEST-2 VIEWS; Future    2. Mild persistent asthma with acute exacerbation    - ipratropium-albuterol (DUONEB) nebulizer solution 3 mL; 3 mL by Nebulization route Once.  - DX-CHEST-2 VIEWS; Future    3. Acute bronchitis, unspecified organism    - doxycycline (VIBRAMYCIN) 100 MG Tab; Take 1 Tab by mouth 2 times a day.  Dispense: 14 Tab; Refill: 0    Increase water intake  May use Ibuprofen/Tylenol prn for fever or body aches  Get rest  May use daily longer acting antihistamine like Claritin   Use inhaler prn for SOB with cough  May use OTC cough suppressant medications like Robitussin prn  Monitor for fevers, productive cough, SOB, CP, chest tightness- need re-evaluation    Patient notified of CXR results

## 2017-04-01 NOTE — MR AVS SNAPSHOT
"Betty Natarajan   2017 10:25 AM   Office Visit   MRN: [id number]    Department:  [facility]   Dept Phone:  [phone]    Description:  Female : 1950   Provider:  MOY Hernandez           Reason for Visit     Cough cough x 2 wks      Allergies as of 2017     Allergen Noted Reactions    Pcn [Penicillins] 2009         You were diagnosed with     Nonproductive cough   [870267]       Mild persistent asthma with acute exacerbation   [752360]       Acute bronchitis, unspecified organism   [3084014]         Vital Signs     Blood Pressure Pulse Temperature Respirations Height Weight    148/80 mmHg 88 36.6 °C (97.9 °F) 16 1.753 m (5' 9\") 97.523 kg (215 lb)    Body Mass Index Oxygen Saturation Smoking Status             31.74 kg/m2 99% Passive Smoke Exposure - Never Smoker         Basic Information     Date Of Birth Sex Race Ethnicity Preferred Language    1950 Female White Non- English      Your appointments     2017  7:10 PM   Sleep Study Diagnostic with SLEEP TECH   [facility] (--)    [address] [facility]  [facility] [id number] [phone]            May 05, 2017 10:40 AM   Follow UP with BRANDO Aceves   [facility] (--)    [address] [facility]  [facility] [id number] [phone]              Problem List              ICD-10-CM Priority Class Noted - Resolved    Vitamin D deficiency E55.9   2010 - Present    Mild intermittent asthma without complication J45.20   2013 - Present    Vitamin D deficiency E55.9   10/29/2014 - Present    Osteoporosis M81.0   12/15/2015 - Present    Obesity (BMI 30-39.9) E66.9   2016 - Present      Health Maintenance        Date Due Completion Dates    PAP SMEAR 10/28/2017 10/28/2014, 2013, 2012, 2012, 2011, 2010    MAMMOGRAM 2018, 2016, 2016, 2013, 10/29/2012, 10/25/2011, 2010, " 8/9/2010, 4/23/2009, 4/23/2009, 4/21/2008, 4/21/2008, 4/20/2007, 4/19/2006, 10/19/2005, 3/2/2005, 2/18/2005    BONE DENSITY 1/16/2018 1/16/2017, 1/6/2016, 1/30/2015 (Done), 10/29/2012, 8/9/2010    Override on 1/30/2015: Done ([facility])    COLONOSCOPY 2/17/2021 2/17/2016    IMM DTaP/Tdap/Td Vaccine (2 - Td) 8/17/2021 8/17/2011, 10/15/2006            Current Immunizations     13-VALENT PCV PREVNAR 12/15/2015    Influenza TIV (IM) 11/29/2012    Influenza Vaccine 12/21/2009    Influenza Vaccine Adult HD 11/16/2016, 12/15/2015    Influenza Vaccine Quad Inj (Pf) 10/28/2014    Pneumococcal Vaccine (UF)Historical Data 12/21/2009    Pneumococcal polysaccharide vaccine (PPSV-23) 10/28/2014    SHINGLES VACCINE 10/28/2014    TD Vaccine 10/15/2006    Tdap Vaccine 8/17/2011      Below and/or attached are the medications your provider expects you to take. Review all of your home medications and newly ordered medications with your provider and/or pharmacist. Follow medication instructions as directed by your provider and/or pharmacist. Please keep your medication list with you and share with your provider. Update the information when medications are discontinued, doses are changed, or new medications (including over-the-counter products) are added; and carry medication information at all times in the event of emergency situations     Allergies:  PCN - (reactions not documented)               Medications  Valid as of: April 01, 2017 - 11:55 AM    Generic Name Brand Name Tablet Size Instructions for use    Albuterol Sulfate (Aero Soln) albuterol 108 (90 BASE) MCG/ACT Inhale 2 Puffs by mouth every 6 hours as needed for Shortness of Breath.        Albuterol Sulfate (Aero Soln) VENTOLIN  (90 BASE) MCG/ACT INHALE 2 PUFFS BY MOUTH EVERY 6 HOURS AS NEEDED FOR SHORTNESS OF BREATH.        Alendronate Sodium (Tab) FOSAMAX 70 MG Take 1 Tab by mouth every 7 days.        Cholecalciferol (Tab) cholecalciferol 1000 UNIT  Take 1,000 Units by mouth every day.        Doxycycline Hyclate (Tab) VIBRAMYCIN 100 MG Take 1 Tab by mouth 2 times a day.        Ergocalciferol (Cap) DRISDOL 67141 UNITS Take 1 Cap by mouth every 7 days.        Furosemide (Tab) LASIX 20 MG Take 0.5 Tabs by mouth 2 times a day.        Hydrocod Polst-Chlorphen Polst (Liquid CR) TUSSIONEX 10-8 MG/5ML Take 5 mL by mouth every 12 hours.        Hydrocodone-Acetaminophen (Tab) NORCO  MG Take 1 Tab by mouth every 6 hours as needed for Mild Pain. No driving or operation of machinery after taking this medication.        Melatonin (Cap) Melatonin 5 MG Take  by mouth at bedtime as needed.        Mometasone Furo-Formoterol Fum (Aerosol) Mometasone Furo-Formoterol Fum 200-5 MCG/ACT Inhale  by mouth.        Probiotic Product (Cap) PROBIOTIC ACIDOPHILUS  Take  by mouth every day.        Sertraline HCl (Tab) ZOLOFT 50 MG Take 1 Tab by mouth every day.        ValACYclovir HCl (Tab) VALTREX 1 GM Take 1 Tab by mouth 3 times a day.        Zolpidem Tartrate (Tab) AMBIEN 5 MG Take 1 Tab by mouth at bedtime as needed for Sleep (1 to 3 po qhs prn insomnia/sleep study. Bring to sleep study.).        .                 Medicines prescribed today were sent to:     [facility] # [id number] [place] [address]    Phone: [phone] Fax: [phone]    Open 24 Hours?: No      Medication refill instructions:       If your prescription bottle indicates you have medication refills left, it is not necessary to call your provider’s office. Please contact your pharmacy and they will refill your medication.    If your prescription bottle indicates you do not have any refills left, you may request refills at any time through one of the following ways: The online [employer] system (except Urgent Care), by calling your provider’s office, or by asking your pharmacy to contact your provider’s office with a refill request. Medication refills are  processed only during regular business hours and may not be available until the next business day. Your provider may request additional information or to have a follow-up visit with you prior to refilling your medication.   *Please Note: Medication refills are assigned a new Rx number when refilled electronically. Your pharmacy may indicate that no refills were authorized even though a new prescription for the same medication is available at the pharmacy. Please request the medicine by name with the pharmacy before contacting your provider for a refill.        Your To Do List     Future Labs/Procedures Complete By Expires    DX-CHEST-2 VIEWS  As directed 4/1/2018         Blowtorch Access Code: Activation code not generated  Current Blowtorch Status: Active

## 2017-04-25 ENCOUNTER — SLEEP STUDY (OUTPATIENT)
Dept: SLEEP MEDICINE | Facility: MEDICAL CENTER | Age: 67
End: 2017-04-25
Attending: INTERNAL MEDICINE
Payer: MEDICARE

## 2017-04-25 DIAGNOSIS — G47.33 OBSTRUCTIVE SLEEP APNEA: ICD-10-CM

## 2017-04-25 PROCEDURE — 95810 POLYSOM 6/> YRS 4/> PARAM: CPT | Performed by: INTERNAL MEDICINE

## 2017-04-25 NOTE — MR AVS SNAPSHOT
Betty Natarajan   2017 7:10 PM   Sleep Study   MRN: [id number]    Department:  [facility]   Dept Phone:  [phone]    Description:  Female : 1950   Provider:  Mela Trotter M.D.           Allergies as of 2017     Allergen Noted Reactions    Pcn [Penicillins] 2009         You were diagnosed with     Obstructive sleep apnea   [503441]         Vital Signs     Smoking Status                   Passive Smoke Exposure - Never Smoker           Basic Information     Date Of Birth Sex Race Ethnicity Preferred Language    1950 Female White Non- English      Your appointments     May 05, 2017 10:40 AM   Follow UP with BRANDO Aceves   [facility] (--)    [address] [place] [address]  [place] [id number] [phone]              Problem List              ICD-10-CM Priority Class Noted - Resolved    Vitamin D deficiency E55.9   2010 - Present    Mild intermittent asthma without complication J45.20   2013 - Present    Vitamin D deficiency E55.9   10/29/2014 - Present    Osteoporosis M81.0   12/15/2015 - Present    Obesity (BMI 30-39.9) E66.9   2016 - Present      Health Maintenance        Date Due Completion Dates    PAP SMEAR 10/28/2017 10/28/2014, 2013, 2012, 2012, 2011, 2010    MAMMOGRAM 2018, 2016, 2013, 10/29/2012, 10/25/2011, 2010, 2010, 2009, 2009, 2008, 2008, 2007, 2006, 10/19/2005, 3/2/2005, 2005    BONE DENSITY 2018, 2016, 2015 (Done), 10/29/2012, 2010    Override on 2015: Done ([facility])    COLONOSCOPY 2021    IMM DTaP/Tdap/Td Vaccine (2 - Td) 2021, 10/15/2006            Current Immunizations     13-VALENT PCV PREVNAR 12/15/2015    Influenza TIV (IM) 2012    Influenza Vaccine 2009    Influenza Vaccine Adult HD 2016,  12/15/2015    Influenza Vaccine Quad Inj (Pf) 10/28/2014    Pneumococcal Vaccine (UF)Historical Data 12/21/2009    Pneumococcal polysaccharide vaccine (PPSV-23) 10/28/2014    SHINGLES VACCINE 10/28/2014    TD Vaccine 10/15/2006    Tdap Vaccine 8/17/2011      Below and/or attached are the medications your provider expects you to take. Review all of your home medications and newly ordered medications with your provider and/or pharmacist. Follow medication instructions as directed by your provider and/or pharmacist. Please keep your medication list with you and share with your provider. Update the information when medications are discontinued, doses are changed, or new medications (including over-the-counter products) are added; and carry medication information at all times in the event of emergency situations     Allergies:  PCN - (reactions not documented)               Medications  Valid as of: April 26, 2017 -  6:45 AM    Generic Name Brand Name Tablet Size Instructions for use    Albuterol Sulfate (Aero Soln) albuterol 108 (90 BASE) MCG/ACT Inhale 2 Puffs by mouth every 6 hours as needed for Shortness of Breath.        Albuterol Sulfate (Aero Soln) VENTOLIN  (90 BASE) MCG/ACT INHALE 2 PUFFS BY MOUTH EVERY 6 HOURS AS NEEDED FOR SHORTNESS OF BREATH.        Alendronate Sodium (Tab) FOSAMAX 70 MG Take 1 Tab by mouth every 7 days.        Cholecalciferol (Tab) cholecalciferol 1000 UNIT Take 1,000 Units by mouth every day.        Doxycycline Hyclate (Tab) VIBRAMYCIN 100 MG Take 1 Tab by mouth 2 times a day.        Ergocalciferol (Cap) DRISDOL 22365 UNITS Take 1 Cap by mouth every 7 days.        Furosemide (Tab) LASIX 20 MG Take 0.5 Tabs by mouth 2 times a day.        Hydrocod Polst-Chlorphen Polst (Liquid CR) TUSSIONEX 10-8 MG/5ML Take 5 mL by mouth every 12 hours.        Hydrocodone-Acetaminophen (Tab) NORCO  MG Take 1 Tab by mouth every 6 hours as needed for Mild Pain. No driving or operation of machinery  after taking this medication.        Melatonin (Cap) Melatonin 5 MG Take  by mouth at bedtime as needed.        Mometasone Furo-Formoterol Fum (Aerosol) Mometasone Furo-Formoterol Fum 200-5 MCG/ACT Inhale  by mouth.        Probiotic Product (Cap) PROBIOTIC ACIDOPHILUS  Take  by mouth every day.        Sertraline HCl (Tab) ZOLOFT 50 MG Take 1 Tab by mouth every day.        ValACYclovir HCl (Tab) VALTREX 1 GM Take 1 Tab by mouth 3 times a day.        Zolpidem Tartrate (Tab) AMBIEN 5 MG Take 1 Tab by mouth at bedtime as needed for Sleep (1 to 3 po qhs prn insomnia/sleep study. Bring to sleep study.).        .                 Medicines prescribed today were sent to:     [email or web address] # [id number] - [place], [place] - [address] [address]    Phone: [phone] Fax: [phone]    Open 24 Hours?: No      Medication refill instructions:       If your prescription bottle indicates you have medication refills left, it is not necessary to call your provider’s office. Please contact your pharmacy and they will refill your medication.    If your prescription bottle indicates you do not have any refills left, you may request refills at any time through one of the following ways: The online [employer] system (except Urgent Care), by calling your provider’s office, or by asking your pharmacy to contact your provider’s office with a refill request. Medication refills are processed only during regular business hours and may not be available until the next business day. Your provider may request additional information or to have a follow-up visit with you prior to refilling your medication.   *Please Note: Medication refills are assigned a new Rx number when refilled electronically. Your pharmacy may indicate that no refills were authorized even though a new prescription for the same medication is available at the pharmacy. Please request the medicine by name with the pharmacy before contacting your  provider for a refill.           MyChart Access Code: Activation code not generated  Current MyChart Status: Active

## 2017-04-26 NOTE — PROCEDURES
Clinical Comments:  The patient underwent a comprehensive polysomnogram using the standard montage for measurement of parameters of sleep, respiratory events, movement abnormalities, heart rate and rhythm. A microphone was used to monitor snoring.      INTERPRETATION:  The total recording time was 425.7 minutes with a sleep period of 413.0 minutes and the total sleep time was 351.5 minutes with a sleep efficiency of 82.6%.  The sleep latency was 12.7 minutes, and REM latency was 60.0 minutes.  The patient experienced 134 arousals in total, for an arousal index of 22.9    RESPIRATORY: The patient had 8 apneas in total.  Of these, 5 were obstructive apneas, and 2 were central apneas.  This resulted in an apnea index (AI) of 1.4.  The patient had 54 hypopneas, for a hypopnea index of 9.2.  The overall AHI was 10.6, while the AHI during Stage R sleep was 0.9.  AHI while supine was 25.5.    OXIMETRY: Oxygen saturation monitoring showed a mean SpO2 of 91.9%, with a minimum oxygen saturation of 83.0%.  Oxygen saturations were less than or = 89% for 18.1 minutes of sleep time.    CARDIAC: The highest heart rate during the recording was 214.0 beats per minute.  The average heart rate during sleep was 69.5 bpm.    LIMB MOVEMENTS: There were a total of 118 PLMs during sleep, of which 1 were PLMs arousals.  This resulted in a PLMS index of 20.1.    66 year old with asthma and depression with symptoms of snoring, nocturnal gasping and choking and daytime fatigue.    Technical summary:The patient underwent a diagnostic polysomnogram. This was a 16 channel montage study to include a 6 channel EEG, a 2 channel EOG, and chin EMG, left and right leg EMG, a snore channel, a nasal pressure transducer, and a nasal oral airflow semester.   Respiratory effort was assessed with the use of a thoracic and abdominal monitor and overnight oximetry was obtained. Audio and video recordings were reviewed. This was a fully attended study and sleep  stage scoring was performed. The test was technically adequate.     General sleep summary:  During the overnight study, there was a normal sleep latency and mildly early REM latency.   The total sleep time was 351 minutes and sleep efficiency was 83%.  Sleep stage proportions showed 12% stage I, 63% stage II, 6% stage Delta, and 18% REM sleep.  In regards to sleep quality there was a moderate degree of sleep fragmentation as shown by the arousal index of 23 an hour. The arousals were due to obstructive hypopneas and respiratory effort related arousals.    Respiratory summary: During the overnight study, the patient demonstrated mild obstructive sleep apnea as shown by the apnea hypopnea index of 11 an hour and a respiratory effort related arousal index of 14 an hour and a total respiratory disturbance index of 25 per hour. There was a total of 62 apneas and hypopneas and 84 respiratory effort related arousals. In the supine position the respiratory disturbance index was 45 an hour and in the non-supine position the respiratory disturbance index was 14 per hour. The respiratory events were associated with a low oxygen saturation of 80% from a baseline oxygen saturation 96% and the desaturation index was 9.2 an hour. There was moderate snoring. The patient demonstrated a normal sinus rhythm with an average heartbeat of 69 bpm. The EKG showed no cardiac arrhythmias or ventricular ectopy.    Periodic limb movement summary: The patient demonstrated an insignificant degree of periodic limb movements as shown by the PLM index of 1.5 per hour.    Impression:  1.  Mild obstructive sleep apnea that becomes moderate to severe in the supine position  2.  Moderate oxygen desaturations with a low oxygen saturation of 80%.  3.  Moderate snoring  4.  Moderate sleep fragmentation  5.  Asthma  6.  Depression    Recommendations:  Treatment for mild sleep apnea is recommended if there is hypersomnolence and or cardiovascular disease  and usually includes CPAP therapy.  In some cases alternative treatment options may prove effective in resolving sleep apnea and these options include upper airway surgery, the use of a dental orthotic or weight loss and positional therapy. Clinical correlation is required. In general patients with sleep apnea are advised to avoid alcohol and sedatives and to not operate a motor vehicle while drowsy. Also untreated sleep apnea is associated with an increased risk for cardiovascular disease.

## 2017-05-05 ENCOUNTER — SLEEP CENTER VISIT (OUTPATIENT)
Dept: SLEEP MEDICINE | Facility: MEDICAL CENTER | Age: 67
End: 2017-05-05
Payer: MEDICARE

## 2017-05-05 VITALS
RESPIRATION RATE: 16 BRPM | HEART RATE: 65 BPM | DIASTOLIC BLOOD PRESSURE: 82 MMHG | OXYGEN SATURATION: 94 % | TEMPERATURE: 98.6 F | HEIGHT: 69 IN | BODY MASS INDEX: 34.36 KG/M2 | WEIGHT: 232 LBS | SYSTOLIC BLOOD PRESSURE: 110 MMHG

## 2017-05-05 DIAGNOSIS — G47.33 OSA (OBSTRUCTIVE SLEEP APNEA): ICD-10-CM

## 2017-05-05 PROCEDURE — 99213 OFFICE O/P EST LOW 20 MIN: CPT | Performed by: NURSE PRACTITIONER

## 2017-05-05 NOTE — PATIENT INSTRUCTIONS
1) Highly recommend CPAP treatment  2) Return in about 3 months (around 8/5/2017) for review of symptoms, if not sooner, follow up with OSIRIS Clifton.

## 2017-05-05 NOTE — PROGRESS NOTES
CC:  Here for f/u sleep issues as listed below    HPI:     Betty presents today for follow up obstructive sleep apnea with sleep study results.  PSG from 4/2017 indicated an AHI of 10.6 that increases to 25.5 while supine and low oxygen of 83%.  She did not meet split night criteria.  Treatment options were discussed with patient including CPAP treatment versus in lab titration, dental appliance, UPPP surgery, and behavioral modifications.  Patient is not interested in treatment at this time. She is upset she did not get to try mask many of the study and I discussed her protocol with her. She would like to think about her decision and potentially try the CPAP machine in 1-2 years. Untreated STEVEN pathophysiology reviewed with patient including cardiac and neurological risk factors. Patient would like to try losing weight. She gained approximately 65 pounds over a year time after she found her brother dead. She was encourage support groups but she planned to start going to in the near future.  Patient is currently sleeping 5-6 hours per night with 1x nighttime awakenings. They have no trouble falling asleep and takes Melatonin with benefit every night.    They do feel refreshed in the morning and denies morning H/A. They occasionally feel tired throughout the day and rarely takes naps 1x per week for appx 20-30minutes long.  Patient reports snoring.  Denies apnea events and paroxysmal nocturnal dyspnea events. They have never fallen asleep in conversation, at the wheel, or at work.  They deny sleepwalking/talking.         Patient Active Problem List    Diagnosis Date Noted   • STEVEN (obstructive sleep apnea) 05/05/2017   • Obesity (BMI 30-39.9) 12/14/2016   • Osteoporosis 12/15/2015   • Vitamin D deficiency 10/29/2014   • Mild intermittent asthma without complication 02/01/2013   • Vitamin D deficiency 08/04/2010       Past Medical History   Diagnosis Date   • Depression    • Asthma    • Pap smear    • Bronchitis    •  Positive PPD    • Influenza    • Tonsillitis        Past Surgical History   Procedure Laterality Date   • Hemorrhoidectomy  5/08   • Nodule excision       Performed by EDELMIRA MONTE at SURGERY [facility] ORS   • Gastric bypass laparoscopic  2001   • Colonoscopy  2005     normal   • Us-needle core bx-breast panel     • Colonoscopy  2/17/2016     internal hemorrhoids   • Pr remv 2nd cataract,corn-scler sectn     • Tonsillectomy         Family History   Problem Relation Age of Onset   • Diabetes Father    • Hypertension Father    • Genitourinary () Father      renal failure on dialysis   • Cancer Brother      brain tumor   • Cancer Maternal Aunt    • Breast Cancer Daughter    • Sleep Apnea Neg Hx    • Heart Attack Brother    • No Known Problems Brother    • No Known Problems Brother        Social History     Social History   • Marital Status:      Spouse Name: N/A   • Number of Children: N/A   • Years of Education: N/A     Occupational History   • retired      Social History Main Topics   • Smoking status: Passive Smoke Exposure - Never Smoker   • Smokeless tobacco: Never Used   • Alcohol Use: 0.0 oz/week     0 Standard drinks or equivalent per week      Comment: occasional   • Drug Use: No   • Sexual Activity: Not on file     Other Topics Concern   • Not on file     Social History Narrative       Current Outpatient Prescriptions   Medication Sig Dispense Refill   • Melatonin 5 MG Cap Take  by mouth at bedtime as needed.     • Probiotic Product (PROBIOTIC ACIDOPHILUS) Cap Take  by mouth every day.     • zolpidem (AMBIEN) 5 MG Tab Take 1 Tab by mouth at bedtime as needed for Sleep (1 to 3 po qhs prn insomnia/sleep study. Bring to sleep study.). 3 Tab 0   • Mometasone Furo-Formoterol Fum (DULERA) 200-5 MCG/ACT Aerosol Inhale  by mouth.     • vitamin D (CHOLECALCIFEROL) 1000 UNIT TABS Take 1,000 Units by mouth every day.       No current facility-administered medications for this visit.          Allergies:  "Pcn      ROS   Gen: Denies fever, chills, unintentional weight loss, fatigue  Resp:Denies Dyspnea  CV: Denies chest pain, chest tightness  Sleep:Denies morning headache, insomnia,  gasping for air, apnea  Neuro: Denies frequent headaches, weakness, dizziness  See HPI.  All other systems reviewed and negative        Vital signs for this encounter:  Filed Vitals:    05/05/17 1043   Height: 1.753 m (5' 9.02\")   Weight: 105.235 kg (232 lb)   Weight % change since last entry.: 0 %   BP: 110/82   Pulse: 65   BMI (Calculated): 34.24   Resp: 16   Temp: 37 °C (98.6 °F)   O2 sat % room air: 94 %                   Physical Exam:   Gen:         Alert and oriented, No apparent distress.   Neck:        No Lymphadenopathy.  Lungs:     Clear to auscultation bilaterally.    CV:          Regular rate and rhythm. No murmurs, rubs or gallops.   Abd:         Soft non tender, non distended.            Ext:          No clubbing, cyanosis, edema.    Assessment   1. STEVEN (obstructive sleep apnea)         PLAN:   Patient Instructions   1) Highly recommend CPAP treatment - will return in future for treatment.   2) Return in about 3 months (around 8/5/2017) for review of symptoms, if not sooner, follow up with OSIRIS Clifton.          "

## 2017-05-05 NOTE — MR AVS SNAPSHOT
"Betty Natarajan   2017 10:40 AM   Sleep Center Visit   MRN: [id number]    Department:  [facility]   Dept Phone:  [phone]    Description:  Female : 1950   Provider:  BRANDO Aceves           Reason for Visit     Apnea Last seen 3/17/17     Results SS 17       Allergies as of 2017     Allergen Noted Reactions    Pcn [Penicillins] 2009         Vital Signs     Blood Pressure Pulse Temperature Respirations Height Weight    110/82 mmHg 65 37 °C (98.6 °F) 16 1.753 m (5' 9.02\") 105.235 kg (232 lb)    Body Mass Index Oxygen Saturation Smoking Status             34.24 kg/m2 94% Passive Smoke Exposure - Never Smoker         Basic Information     Date Of Birth Sex Race Ethnicity Preferred Language    1950 Female White Non- English      Problem List              ICD-10-CM Priority Class Noted - Resolved    Vitamin D deficiency E55.9   2010 - Present    Mild intermittent asthma without complication J45.20   2013 - Present    Vitamin D deficiency E55.9   10/29/2014 - Present    Osteoporosis M81.0   12/15/2015 - Present    Obesity (BMI 30-39.9) E66.9   2016 - Present      Health Maintenance        Date Due Completion Dates    PAP SMEAR 10/28/2017 10/28/2014, 2013, 2012, 2012, 2011, 2010    MAMMOGRAM 2018, 2016, 2013, 10/29/2012, 10/25/2011, 2010, 2010, 2009, 2009, 2008, 2008, 2007, 2006, 10/19/2005, 3/2/2005, 2005    BONE DENSITY 2018, 2016, 2015 (Done), 10/29/2012, 2010    Override on 2015: Done ([facility])    COLONOSCOPY 2021    IMM DTaP/Tdap/Td Vaccine (2 - Td) 2021, 10/15/2006            Current Immunizations     13-VALENT PCV PREVNAR 12/15/2015    Influenza TIV (IM) 2012    Influenza Vaccine 2009    Influenza Vaccine Adult HD 2016, 12/15/2015    Influenza " Vaccine Quad Inj (Pf) 10/28/2014    Pneumococcal Vaccine (UF)Historical Data 12/21/2009    Pneumococcal polysaccharide vaccine (PPSV-23) 10/28/2014    SHINGLES VACCINE 10/28/2014    TD Vaccine 10/15/2006    Tdap Vaccine 8/17/2011      Below and/or attached are the medications your provider expects you to take. Review all of your home medications and newly ordered medications with your provider and/or pharmacist. Follow medication instructions as directed by your provider and/or pharmacist. Please keep your medication list with you and share with your provider. Update the information when medications are discontinued, doses are changed, or new medications (including over-the-counter products) are added; and carry medication information at all times in the event of emergency situations     Allergies:  PCN - (reactions not documented)               Medications  Valid as of: May 05, 2017 - 11:06 AM    Generic Name Brand Name Tablet Size Instructions for use    Albuterol Sulfate (Aero Soln) albuterol 108 (90 BASE) MCG/ACT Inhale 2 Puffs by mouth every 6 hours as needed for Shortness of Breath.        Albuterol Sulfate (Aero Soln) VENTOLIN  (90 BASE) MCG/ACT INHALE 2 PUFFS BY MOUTH EVERY 6 HOURS AS NEEDED FOR SHORTNESS OF BREATH.        Alendronate Sodium (Tab) FOSAMAX 70 MG Take 1 Tab by mouth every 7 days.        Cholecalciferol (Tab) cholecalciferol 1000 UNIT Take 1,000 Units by mouth every day.        Doxycycline Hyclate (Tab) VIBRAMYCIN 100 MG Take 1 Tab by mouth 2 times a day.        Ergocalciferol (Cap) DRISDOL 61277 UNITS Take 1 Cap by mouth every 7 days.        Furosemide (Tab) LASIX 20 MG Take 0.5 Tabs by mouth 2 times a day.        Hydrocod Polst-Chlorphen Polst (Liquid CR) TUSSIONEX 10-8 MG/5ML Take 5 mL by mouth every 12 hours.        Hydrocodone-Acetaminophen (Tab) NORCO  MG Take 1 Tab by mouth every 6 hours as needed for Mild Pain. No driving or operation of machinery after taking this  medication.        Melatonin (Cap) Melatonin 5 MG Take  by mouth at bedtime as needed.        Mometasone Furo-Formoterol Fum (Aerosol) Mometasone Furo-Formoterol Fum 200-5 MCG/ACT Inhale  by mouth.        Probiotic Product (Cap) PROBIOTIC ACIDOPHILUS  Take  by mouth every day.        Sertraline HCl (Tab) ZOLOFT 50 MG Take 1 Tab by mouth every day.        ValACYclovir HCl (Tab) VALTREX 1 GM Take 1 Tab by mouth 3 times a day.        Zolpidem Tartrate (Tab) AMBIEN 5 MG Take 1 Tab by mouth at bedtime as needed for Sleep (1 to 3 po qhs prn insomnia/sleep study. Bring to sleep study.).        .                 Medicines prescribed today were sent to:     [id number] - [place], [place] - [address] [address]    Phone: [phone] Fax: [phone]    Open 24 Hours?: No      Medication refill instructions:       If your prescription bottle indicates you have medication refills left, it is not necessary to call your provider’s office. Please contact your pharmacy and they will refill your medication.    If your prescription bottle indicates you do not have any refills left, you may request refills at any time through one of the following ways: The online [employer] system (except Urgent Care), by calling your provider’s office, or by asking your pharmacy to contact your provider’s office with a refill request. Medication refills are processed only during regular business hours and may not be available until the next business day. Your provider may request additional information or to have a follow-up visit with you prior to refilling your medication.   *Please Note: Medication refills are assigned a new Rx number when refilled electronically. Your pharmacy may indicate that no refills were authorized even though a new prescription for the same medication is available at the pharmacy. Please request the medicine by name with the pharmacy before contacting your provider for a  refill.        Instructions    1) Highly recommend CPAP treatment  2) Return in about 3 months (around 8/5/2017) for review of symptoms, if not sooner, follow up with OSIRIS Clifton.              [employer] Access Code: Activation code not generated  Current [employer] Status: Active

## 2017-05-10 ENCOUNTER — HOSPITAL ENCOUNTER (EMERGENCY)
Facility: MEDICAL CENTER | Age: 67
End: 2017-05-10
Attending: EMERGENCY MEDICINE
Payer: MEDICARE

## 2017-05-10 VITALS
HEART RATE: 67 BPM | SYSTOLIC BLOOD PRESSURE: 151 MMHG | HEIGHT: 69 IN | BODY MASS INDEX: 34.51 KG/M2 | OXYGEN SATURATION: 95 % | TEMPERATURE: 98 F | DIASTOLIC BLOOD PRESSURE: 94 MMHG | RESPIRATION RATE: 16 BRPM | WEIGHT: 233.03 LBS

## 2017-05-10 DIAGNOSIS — I10 ASYMPTOMATIC HYPERTENSION: ICD-10-CM

## 2017-05-10 LAB — EKG IMPRESSION: NORMAL

## 2017-05-10 PROCEDURE — 93005 ELECTROCARDIOGRAM TRACING: CPT | Performed by: EMERGENCY MEDICINE

## 2017-05-10 PROCEDURE — 99283 EMERGENCY DEPT VISIT LOW MDM: CPT

## 2017-05-10 ASSESSMENT — PAIN SCALES - GENERAL: PAINLEVEL_OUTOF10: 4

## 2017-05-10 NOTE — ED AVS SNAPSHOT
[employer] Access Code: Activation code not generated  Current [employer] Status: Active    [employer]  A secure, online tool to manage your health information     [employer]’s [employer]® is a secure, online tool that connects you to your personalized health information from the privacy of your home -- day or night - making it very easy for you to manage your healthcare. Once the activation process is completed, you can even access your medical information using the [employer] mckinley, which is available for free in the [employer] store or Google Play store.     The [employer] Club provides the following levels of access (as shown below):   My Chart Features   [facility] Primary Care Doctor [facility]  Specialists [facility]  Urgent  Care Non-[facility]  Primary Care  Doctor   Email your healthcare team securely and privately 24/7 X X X X   Manage appointments: schedule your next appointment; view details of past/upcoming appointments X      Request prescription refills. X      View recent personal medical records, including lab and immunizations X X X X   View health record, including health history, allergies, medications X X X X   Read reports about your outpatient visits, procedures, consult and ER notes X X X X   See your discharge summary, which is a recap of your hospital and/or ER visit that includes your diagnosis, lab results, and care plan. X X       How to register for The [employer] Club:  1. Go to  [email or web address].  2. Click on the Sign Up Now box, which takes you to the New Member Sign Up page. You will need to provide the following information:  a. Enter your [employer] Access Code exactly as it appears at the top of this page. (You will not need to use this code after you’ve completed the sign-up process. If you do not sign up before the expiration date, you must request a new code.)   b. Enter your date of birth.   c. Enter your home email address.   d. Click Submit, and follow the next screen’s instructions.  3. Create a The Style Club ID. This will  be your [employer] login ID and cannot be changed, so think of one that is secure and easy to remember.  4. Create a [employer] password. You can change your password at any time.  5. Enter your Password Reset Question and Answer. This can be used at a later time if you forget your password.   6. Enter your e-mail address. This allows you to receive e-mail notifications when new information is available in [employer].  7. Click Sign Up. You can now view your health information.    For assistance activating your [employer] account, call [phone]

## 2017-05-10 NOTE — ED AVS SNAPSHOT
5/10/2017    Betty Natarajan  [address] [address]    Dear Betty:    [facility] wants to ensure your discharge home is safe and you or your loved ones have had all of your questions answered regarding your care after you leave the hospital.    Below is a list of resources and contact information should you have any questions regarding your hospital stay, follow-up instructions, or active medical symptoms.    Questions or Concerns Regarding… Contact   Medical Questions Related to Your Discharge  (7 days a week, 8am-5pm) Contact a Nurse Care Coordinator   [phone]   Medical Questions Not Related to Your Discharge  (24 hours a day / 7 days a week)  Contact the Nurse Health Line   [phone]    Medications or Discharge Instructions Refer to your discharge packet   or contact your [facility] Primary Care Provider   [phone]   Follow-up Appointment(s) Schedule your appointment via [employer]   or contact Scheduling [phone]   Billing Review your statement via [employer]  or contact Billing [phone]   Medical Records Review your records via [employer]   or contact Medical Records [phone]     You may receive a telephone call within two days of discharge. This call is to make certain you understand your discharge instructions and have the opportunity to have any questions answered. You can also easily access your medical information, test results and upcoming appointments via the [employer] free online health management tool. You can learn more and sign up at [email or web address]. For assistance setting up your [employer] account, please call [phone].    Once again, we want to ensure your discharge home is safe and that you have a clear understanding of any next steps in your care. If you have any questions or concerns, please do not hesitate to contact us, we are here for you. Thank you for choosing [facility] for your healthcare needs.    Sincerely,    Your [facility] Healthcare Team

## 2017-05-10 NOTE — ED AVS SNAPSHOT
Home Care Instructions                                                                                                                Betty Natarajan   MRN: [id number]    Department:  [facility], Emergency Dept   Date of Visit:  5/10/2017            [facility], Emergency Dept    [address] [place] [address]    Phone:  [phone]      You were seen by     Colin Aviles M.D.      Your Diagnosis Was     Asymptomatic hypertension     I10       Follow-up Information     1. Schedule an appointment as soon as possible for a visit with BRANDO Garrido.    Specialty:  Family Medicine    Contact information    [address] [place] [address] [phone]        Medication Information     Review all of your home medications and newly ordered medications with your primary doctor and/or pharmacist as soon as possible. Follow medication instructions as directed by your doctor and/or pharmacist.     Please keep your complete medication list with you and share with your physician. Update the information when medications are discontinued, doses are changed, or new medications (including over-the-counter products) are added; and carry medication information at all times in the event of emergency situations.               Medication List      ASK your doctor about these medications        Instructions    Morning Afternoon Evening Bedtime    DULERA 200-5 MCG/ACT Aero   Generic drug:  Mometasone Furo-Formoterol Fum        Inhale  by mouth.                        Melatonin 5 MG Caps        Take  by mouth at bedtime as needed.                        PROBIOTIC ACIDOPHILUS Caps        Take  by mouth every day.                        vitamin D 1000 UNIT Tabs   Commonly known as:  cholecalciferol        Take 1,000 Units by mouth every day.   Dose:  1000 Units                        zolpidem 5 MG Tabs   Commonly known as:  AMBIEN        Take 1 Tab by mouth  at bedtime as needed for Sleep (1 to 3 po qhs prn insomnia/sleep study. Bring to sleep study.).   Dose:  5 mg                                  Discharge Instructions       Arterial Hypertension  Arterial hypertension (high blood pressure) is a condition of elevated pressure in your blood vessels. Hypertension over a long period of time is a risk factor for strokes, heart attacks, and heart failure. It is also the leading cause of kidney (renal) failure.   CAUSES   · In Adults -- Over 90% of all hypertension has no known cause. This is called essential or primary hypertension. In the other 10% of people with hypertension, the increase in blood pressure is caused by another disorder. This is called secondary hypertension. Important causes of secondary hypertension are:  · Heavy alcohol use.  · Obstructive sleep apnea.  · Hyperaldosterosim (Conn's syndrome).  · Steroid use.  · Chronic kidney failure.  · Hyperparathyroidism.  · Medications.  · Renal artery stenosis.  · Pheochromocytoma.  · Cushing's disease.  · Coarctation of the aorta.  · Scleroderma renal crisis.  · Licorice (in excessive amounts).  · Drugs (cocaine, methamphetamine).  Your caregiver can explain any items above that apply to you.  · In Children -- Secondary hypertension is more common and should always be considered.  · Pregnancy -- Few women of childbearing age have high blood pressure. However, up to 10% of them develop hypertension of pregnancy. Generally, this will not harm the woman. It may be a sign of 3 complications of pregnancy: preeclampsia, HELLP syndrome, and eclampsia. Follow up and control with medication is necessary.  SYMPTOMS   · This condition normally does not produce any noticeable symptoms. It is usually found during a routine exam.  · Malignant hypertension is a late problem of high blood pressure. It may have the following symptoms:  · Headaches.  · Blurred vision.  · End-organ damage (this means your kidneys, heart, lungs, and  "other organs are being damaged).  · Stressful situations can increase the blood pressure. If a person with normal blood pressure has their blood pressure go up while being seen by their caregiver, this is often termed \"white coat hypertension.\" Its importance is not known. It may be related with eventually developing hypertension or complications of hypertension.  · Hypertension is often confused with mental tension, stress, and anxiety.  DIAGNOSIS   The diagnosis is made by 3 separate blood pressure measurements. They are taken at least 1 week apart from each other. If there is organ damage from hypertension, the diagnosis may be made without repeat measurements.  Hypertension is usually identified by having blood pressure readings:  · Above 140/90 mmHg measured in both arms, at 3 separate times, over a couple weeks.  · Over 130/80 mmHg should be considered a risk factor and may require treatment in patients with diabetes.  Blood pressure readings over 120/80 mmHg are called \"pre-hypertension\" even in non-diabetic patients.  To get a true blood pressure measurement, use the following guidelines. Be aware of the factors that can alter blood pressure readings.  · Take measurements at least 1 hour after caffeine.  · Take measurements 30 minutes after smoking and without any stress. This is another reason to quit smoking  it raises your blood pressure.  · Use a proper cuff size. Ask your caregiver if you are not sure about your cuff size.  · Most home blood pressure cuffs are automatic. They will measure systolic and diastolic pressures. The systolic pressure is the pressure reading at the start of sounds. Diastolic pressure is the pressure at which the sounds disappear. If you are elderly, measure pressures in multiple postures. Try sitting, lying or standing.  · Sit at rest for a minimum of 5 minutes before taking measurements.  · You should not be on any medications like decongestants. These are found in many cold " "medications.  · Record your blood pressure readings and review them with your caregiver.  If you have hypertension:  · Your caregiver may do tests to be sure you do not have secondary hypertension (see \"causes\" above).  · Your caregiver may also look for signs of metabolic syndrome. This is also called Syndrome X or Insulin Resistance Syndrome. You may have this syndrome if you have type 2 diabetes, abdominal obesity, and abnormal blood lipids in addition to hypertension.  · Your caregiver will take your medical and family history and perform a physical exam.  · Diagnostic tests may include blood tests (for glucose, cholesterol, potassium, and kidney function), a urinalysis, or an EKG. Other tests may also be necessary depending on your condition.  PREVENTION   There are important lifestyle issues that you can adopt to reduce your chance of developing hypertension:  · Maintain a normal weight.  · Limit the amount of salt (sodium) in your diet.  · Exercise often.  · Limit alcohol intake.  · Get enough potassium in your diet. Discuss specific advice with your caregiver.  · Follow a DASH diet (dietary approaches to stop hypertension). This diet is rich in fruits, vegetables, and low-fat dairy products, and avoids certain fats.  PROGNOSIS   Essential hypertension cannot be cured. Lifestyle changes and medical treatment can lower blood pressure and reduce complications. The prognosis of secondary hypertension depends on the underlying cause. Many people whose hypertension is controlled with medicine or lifestyle changes can live a normal, healthy life.   RISKS AND COMPLICATIONS   While high blood pressure alone is not an illness, it often requires treatment due to its short- and long-term effects on many organs. Hypertension increases your risk for:  · CVAs or strokes (cerebrovascular accident).  · Heart failure due to chronically high blood pressure (hypertensive cardiomyopathy).  · Heart attack (myocardial " "infarction).  · Damage to the retina (hypertensive retinopathy).  · Kidney failure (hypertensive nephropathy).  Your caregiver can explain list items above that apply to you. Treatment of hypertension can significantly reduce the risk of complications.  TREATMENT   · For overweight patients, weight loss and regular exercise are recommended. Physical fitness lowers blood pressure.  · Mild hypertension is usually treated with diet and exercise. A diet rich in fruits and vegetables, fat-free dairy products, and foods low in fat and salt (sodium) can help lower blood pressure. Decreasing salt intake decreases blood pressure in a 1/3 of people.  · Stop smoking if you are a smoker.  The steps above are highly effective in reducing blood pressure. While these actions are easy to suggest, they are difficult to achieve. Most patients with moderate or severe hypertension end up requiring medications to bring their blood pressure down to a normal level. There are several classes of medications for treatment. Blood pressure pills (antihypertensives) will lower blood pressure by their different actions. Lowering the blood pressure by 10 mmHg may decrease the risk of complications by as much as 25%.  The goal of treatment is effective blood pressure control. This will reduce your risk for complications. Your caregiver will help you determine the best treatment for you according to your lifestyle. What is excellent treatment for one person, may not be for you.  HOME CARE INSTRUCTIONS   · Do not smoke.  · Follow the lifestyle changes outlined in the \"Prevention\" section.  · If you are on medications, follow the directions carefully. Blood pressure medications must be taken as prescribed. Skipping doses reduces their benefit. It also puts you at risk for problems.  · Follow up with your caregiver, as directed.  · If you are asked to monitor your blood pressure at home, follow the guidelines in the \"Diagnosis\" section above.  SEEK " MEDICAL CARE IF:   · You think you are having medication side effects.  · You have recurrent headaches or lightheadedness.  · You have swelling in your ankles.  · You have trouble with your vision.  SEEK IMMEDIATE MEDICAL CARE IF:   · You have sudden onset of chest pain or pressure, difficulty breathing, or other symptoms of a heart attack.  · You have a severe headache.  · You have symptoms of a stroke (such as sudden weakness, difficulty speaking, difficulty walking).  MAKE SURE YOU:   · Understand these instructions.  · Will watch your condition.  · Will get help right away if you are not doing well or get worse.  Document Released: 12/18/2006 Document Revised: 03/11/2013 Document Reviewed: 07/17/2008  [employer]® Patient Information ©2014 [employer].            Patient Information     Patient Information    Following emergency treatment: all patient requiring follow-up care must return either to a private physician or a clinic if your condition worsens before you are able to obtain further medical attention, please return to the emergency room.     Billing Information    At [facility], we work to make the billing process streamlined for our patients.  Our Representatives are here to answer any questions you may have regarding your hospital bill.  If you have insurance coverage and have supplied your insurance information to us, we will submit a claim to your insurer on your behalf.  Should you have any questions regarding your bill, we can be reached online or by phone as follows:  Online: You are able pay your bills online or live chat with our representatives about any billing questions you may have. We are here to help Monday - Friday from 8:00am to 7:30pm and 9:00am - 12:00pm on Saturdays.  Please visit [email or web address]  for more information.   Phone:  [phone] or [phone]    Please note that your emergency physician, surgeon, pathologist, radiologist,  anesthesiologist, and other specialists are not employed by [facility] and will therefore bill separately for their services.  Please contact them directly for any questions concerning their bills at the numbers below:     Emergency Physician Services:  [phone]  [facility]:  [phone]  [facility]:  [phone]  [facility]:  [phone]    1. Your final bill may vary from the amount quoted upon discharge if all procedures are not complete at that time, or if your doctor has additional procedures of which we are not aware. You will receive an additional bill if you return to the Emergency Department at [facility] for suture removal regardless of the facility of which the sutures were placed.     2. Please arrange for settlement of this account at the emergency registration.    3. All self-pay accounts are due in full at the time of treatment.  If you are unable to meet this obligation then payment is expected within 4-5 days.     4. If you have had radiology studies (CT, X-ray, Ultrasound, MRI), you have received a preliminary result during your emergency department visit. Please contact the radiology department [phone] to receive a copy of your final result. Please discuss the Final result with your primary physician or with the follow up physician provided.     Crisis Hotline:  [place] Crisis Hotline:  5-772-KTUWUAX or 1-461.562.5591  [place] Crisis Hotline:    1-218.844.8135 or 696-915-5693         ED Discharge Follow Up Questions    1. In order to provide you with very good care, we would like to follow up with a phone call in the next few days.  May we have your permission to contact you?     YES /  NO    2. What is the best phone number to call you? (       )_____-__________    3. What is the best time to call you?      Morning  /  Afternoon  /  Evening                   Patient Signature:   ____________________________________________________________    Date:  ____________________________________________________________

## 2017-05-11 NOTE — DISCHARGE INSTRUCTIONS
Arterial Hypertension  Arterial hypertension (high blood pressure) is a condition of elevated pressure in your blood vessels. Hypertension over a long period of time is a risk factor for strokes, heart attacks, and heart failure. It is also the leading cause of kidney (renal) failure.   CAUSES   · In Adults -- Over 90% of all hypertension has no known cause. This is called essential or primary hypertension. In the other 10% of people with hypertension, the increase in blood pressure is caused by another disorder. This is called secondary hypertension. Important causes of secondary hypertension are:  · Heavy alcohol use.  · Obstructive sleep apnea.  · Hyperaldosterosim (Conn's syndrome).  · Steroid use.  · Chronic kidney failure.  · Hyperparathyroidism.  · Medications.  · Renal artery stenosis.  · Pheochromocytoma.  · Cushing's disease.  · Coarctation of the aorta.  · Scleroderma renal crisis.  · Licorice (in excessive amounts).  · Drugs (cocaine, methamphetamine).  Your caregiver can explain any items above that apply to you.  · In Children -- Secondary hypertension is more common and should always be considered.  · Pregnancy -- Few women of childbearing age have high blood pressure. However, up to 10% of them develop hypertension of pregnancy. Generally, this will not harm the woman. It may be a sign of 3 complications of pregnancy: preeclampsia, HELLP syndrome, and eclampsia. Follow up and control with medication is necessary.  SYMPTOMS   · This condition normally does not produce any noticeable symptoms. It is usually found during a routine exam.  · Malignant hypertension is a late problem of high blood pressure. It may have the following symptoms:  · Headaches.  · Blurred vision.  · End-organ damage (this means your kidneys, heart, lungs, and other organs are being damaged).  · Stressful situations can increase the blood pressure. If a person with normal blood pressure has their blood pressure go up while being  "seen by their caregiver, this is often termed \"white coat hypertension.\" Its importance is not known. It may be related with eventually developing hypertension or complications of hypertension.  · Hypertension is often confused with mental tension, stress, and anxiety.  DIAGNOSIS   The diagnosis is made by 3 separate blood pressure measurements. They are taken at least 1 week apart from each other. If there is organ damage from hypertension, the diagnosis may be made without repeat measurements.  Hypertension is usually identified by having blood pressure readings:  · Above 140/90 mmHg measured in both arms, at 3 separate times, over a couple weeks.  · Over 130/80 mmHg should be considered a risk factor and may require treatment in patients with diabetes.  Blood pressure readings over 120/80 mmHg are called \"pre-hypertension\" even in non-diabetic patients.  To get a true blood pressure measurement, use the following guidelines. Be aware of the factors that can alter blood pressure readings.  · Take measurements at least 1 hour after caffeine.  · Take measurements 30 minutes after smoking and without any stress. This is another reason to quit smoking  it raises your blood pressure.  · Use a proper cuff size. Ask your caregiver if you are not sure about your cuff size.  · Most home blood pressure cuffs are automatic. They will measure systolic and diastolic pressures. The systolic pressure is the pressure reading at the start of sounds. Diastolic pressure is the pressure at which the sounds disappear. If you are elderly, measure pressures in multiple postures. Try sitting, lying or standing.  · Sit at rest for a minimum of 5 minutes before taking measurements.  · You should not be on any medications like decongestants. These are found in many cold medications.  · Record your blood pressure readings and review them with your caregiver.  If you have hypertension:  · Your caregiver may do tests to be sure you do not have " "secondary hypertension (see \"causes\" above).  · Your caregiver may also look for signs of metabolic syndrome. This is also called Syndrome X or Insulin Resistance Syndrome. You may have this syndrome if you have type 2 diabetes, abdominal obesity, and abnormal blood lipids in addition to hypertension.  · Your caregiver will take your medical and family history and perform a physical exam.  · Diagnostic tests may include blood tests (for glucose, cholesterol, potassium, and kidney function), a urinalysis, or an EKG. Other tests may also be necessary depending on your condition.  PREVENTION   There are important lifestyle issues that you can adopt to reduce your chance of developing hypertension:  · Maintain a normal weight.  · Limit the amount of salt (sodium) in your diet.  · Exercise often.  · Limit alcohol intake.  · Get enough potassium in your diet. Discuss specific advice with your caregiver.  · Follow a DASH diet (dietary approaches to stop hypertension). This diet is rich in fruits, vegetables, and low-fat dairy products, and avoids certain fats.  PROGNOSIS   Essential hypertension cannot be cured. Lifestyle changes and medical treatment can lower blood pressure and reduce complications. The prognosis of secondary hypertension depends on the underlying cause. Many people whose hypertension is controlled with medicine or lifestyle changes can live a normal, healthy life.   RISKS AND COMPLICATIONS   While high blood pressure alone is not an illness, it often requires treatment due to its short- and long-term effects on many organs. Hypertension increases your risk for:  · CVAs or strokes (cerebrovascular accident).  · Heart failure due to chronically high blood pressure (hypertensive cardiomyopathy).  · Heart attack (myocardial infarction).  · Damage to the retina (hypertensive retinopathy).  · Kidney failure (hypertensive nephropathy).  Your caregiver can explain list items above that apply to you. Treatment " "of hypertension can significantly reduce the risk of complications.  TREATMENT   · For overweight patients, weight loss and regular exercise are recommended. Physical fitness lowers blood pressure.  · Mild hypertension is usually treated with diet and exercise. A diet rich in fruits and vegetables, fat-free dairy products, and foods low in fat and salt (sodium) can help lower blood pressure. Decreasing salt intake decreases blood pressure in a 1/3 of people.  · Stop smoking if you are a smoker.  The steps above are highly effective in reducing blood pressure. While these actions are easy to suggest, they are difficult to achieve. Most patients with moderate or severe hypertension end up requiring medications to bring their blood pressure down to a normal level. There are several classes of medications for treatment. Blood pressure pills (antihypertensives) will lower blood pressure by their different actions. Lowering the blood pressure by 10 mmHg may decrease the risk of complications by as much as 25%.  The goal of treatment is effective blood pressure control. This will reduce your risk for complications. Your caregiver will help you determine the best treatment for you according to your lifestyle. What is excellent treatment for one person, may not be for you.  HOME CARE INSTRUCTIONS   · Do not smoke.  · Follow the lifestyle changes outlined in the \"Prevention\" section.  · If you are on medications, follow the directions carefully. Blood pressure medications must be taken as prescribed. Skipping doses reduces their benefit. It also puts you at risk for problems.  · Follow up with your caregiver, as directed.  · If you are asked to monitor your blood pressure at home, follow the guidelines in the \"Diagnosis\" section above.  SEEK MEDICAL CARE IF:   · You think you are having medication side effects.  · You have recurrent headaches or lightheadedness.  · You have swelling in your ankles.  · You have trouble with " your vision.  SEEK IMMEDIATE MEDICAL CARE IF:   · You have sudden onset of chest pain or pressure, difficulty breathing, or other symptoms of a heart attack.  · You have a severe headache.  · You have symptoms of a stroke (such as sudden weakness, difficulty speaking, difficulty walking).  MAKE SURE YOU:   · Understand these instructions.  · Will watch your condition.  · Will get help right away if you are not doing well or get worse.  Document Released: 12/18/2006 Document Revised: 03/11/2013 Document Reviewed: 07/17/2008  [employer] Patient Information ©2014 [employer].

## 2017-05-11 NOTE — ED NOTES
ERP at bedside. Pt agrees with plan of care discussed by ERP. AIDET acknowledged with patient. Johnathan in low position, side rail up for pt safety. Call light within reach. Will continue to monitor.

## 2017-05-11 NOTE — ED PROVIDER NOTES
ED Provider Note    CHIEF COMPLAINT  Chief Complaint   Patient presents with   • Blood Pressure Problem       HPI  Betty Natarajan is a 66 y.o. female who presents here for evaluation of hypertension. The patient states that she has been having some intermittent neck pain, some bilateral feet cramping, and generally feeling fatigued over the past week. Additionally the patient had one episode of a sharp pain in her right armpit which was self-limited, moderate in severity, and without any exacerbating or alleviating factors. The patient states that she checked her blood pressure this morning and found it to be elevated. The patient contacted her daughter who works in a medical office, and had her blood pressure rechecked there couple of times finding to be in the 170s systolically both times. The patient stopped by a [facility] pharmacy on the way home, and also rechecked her blood pressure there, and found it still to be elevated. The patient doesn't normally have hypertension, and due to this and concerned about her elevated blood pressure, the patient decided to present here for further evaluation.     REVIEW OF SYSTEMS   Patient denies fever, vision change, sore throat, chest pain, shortness of breath, nausea, dysuria, focal muscle pain, rashes, or neurologic deficits     PAST MEDICAL HISTORY   has a past medical history of Depression; Asthma; Pap smear; Bronchitis; Positive PPD; Influenza; and Tonsillitis.    SOCIAL HISTORY  Social History     Social History Main Topics   • Smoking status: Passive Smoke Exposure - Never Smoker   • Smokeless tobacco: Never Used   • Alcohol Use: 0.0 oz/week     0 Standard drinks or equivalent per week      Comment: occasional   • Drug Use: No   • Sexual Activity: Not on file       SURGICAL HISTORY   has past surgical history that includes hemorrhoidectomy (5/08); nodule excision; gastric bypass laparoscopic (2001); colonoscopy (2005); us-needle core bx-breast panel; colonoscopy  "(2/17/2016); remv 2nd cataract,corn-scler sectn; and tonsillectomy.    CURRENT MEDICATIONS  Home Medications     **Home medications have not yet been reviewed for this encounter**          ALLERGIES  Allergies   Allergen Reactions   • Pcn [Penicillins]        PHYSICAL EXAM  VITAL SIGNS: /94 mmHg  Pulse 67  Temp(Src) 36.7 °C (98 °F)  Resp 16  Ht 1.753 m (5' 9\")  Wt 105.7 kg (233 lb 0.4 oz)  BMI 34.40 kg/m2  SpO2 95%   Pulse ox interpretation: I interpret this pulse ox as normal.  Constitutional: Alert in no apparent distress.  HENT: Head normocephalic, atraumatic, Bilateral external ears normal, Nose normal.  Eyes: Pupils are equal, extraocular movements intact, Conjunctiva normal, Non-icteric.   Neck: Normal range of motion, Supple, No stridor.   Lymphatic: No lymphadenopathy noted.   Cardiovascular: Regular rate and rhythm   Thorax & Lungs: No acute respiratory distress, No wheezing, No increased work of breathing.   Abdomen: Non-distended   Skin: Warm, Dry, No erythema, No rash.   Back: No bony tenderness, No CVA tenderness.   Extremities: Intact distal pulses, No edema, No tenderness, No cyanosis   Musculoskeletal: Good range of motion in all major joints. No tenderness to palpation or major deformities noted.   Neurologic: Alert , Normal motor function, Normal sensory function, No focal deficits noted.   Psychiatric: Affect normal, Judgment normal, Mood normal.     COURSE & MEDICAL DECISION MAKING  Pertinent Labs & Imaging studies reviewed. (See chart for details)    Patient presented here for evaluation of asymptomatic hypertension. Here the patient has no chest pain, no shortness of breath, no confusion. According to ACEP guidelines, the patient does not require empiric testing for anything given the asymptomatic nature of her condition. Here the patient appears well, and while hypertensive emergency was considered, I do not think this is currently the patient's condition. The patient here has " actually fairly minimally elevated blood pressures, and no prescription will be given due to the concern that the patient might have normal blood pressures and normal circumstances and not wanting to lower her pressure too much. The patient will be referred to her primary care doctor for further evaluation and possible treatment.    The patient will return for worsening symptoms and is stable at the time of discharge. The patient verbalizes understanding.    The patient is referred to a primary physician for blood pressure management, diabetic screening, and for all other preventative health concerns should they be present.     FINAL IMPRESSION  1. Asymptomatic hypertension  2.   3.          Electronically signed by: Colin Aviles, 5/10/2017 7:33 PM    This record was made with a voice recognition software. I have tried to correct any grammar, spelling or context errors to the best of my ability, but errors may still remain. Interpretation of this chart should be taken in this context.

## 2017-05-17 ENCOUNTER — OFFICE VISIT (OUTPATIENT)
Dept: MEDICAL GROUP | Facility: PHYSICIAN GROUP | Age: 67
End: 2017-05-17
Payer: MEDICARE

## 2017-05-17 VITALS
HEART RATE: 70 BPM | TEMPERATURE: 97.1 F | SYSTOLIC BLOOD PRESSURE: 112 MMHG | HEIGHT: 69 IN | BODY MASS INDEX: 33.33 KG/M2 | WEIGHT: 225 LBS | OXYGEN SATURATION: 95 % | DIASTOLIC BLOOD PRESSURE: 78 MMHG

## 2017-05-17 DIAGNOSIS — E66.9 OBESITY (BMI 30-39.9): ICD-10-CM

## 2017-05-17 DIAGNOSIS — R03.0 ELEVATED BLOOD PRESSURE, SITUATIONAL: ICD-10-CM

## 2017-05-17 PROCEDURE — G8432 DEP SCR NOT DOC, RNG: HCPCS | Performed by: INTERNAL MEDICINE

## 2017-05-17 PROCEDURE — 1036F TOBACCO NON-USER: CPT | Performed by: INTERNAL MEDICINE

## 2017-05-17 PROCEDURE — 99213 OFFICE O/P EST LOW 20 MIN: CPT | Performed by: INTERNAL MEDICINE

## 2017-05-17 PROCEDURE — G8419 CALC BMI OUT NRM PARAM NOF/U: HCPCS | Performed by: INTERNAL MEDICINE

## 2017-05-17 PROCEDURE — 3014F SCREEN MAMMO DOC REV: CPT | Performed by: INTERNAL MEDICINE

## 2017-05-17 PROCEDURE — 1101F PT FALLS ASSESS-DOCD LE1/YR: CPT | Mod: 8P | Performed by: INTERNAL MEDICINE

## 2017-05-17 PROCEDURE — 4040F PNEUMOC VAC/ADMIN/RCVD: CPT | Performed by: INTERNAL MEDICINE

## 2017-05-17 ASSESSMENT — PAIN SCALES - GENERAL: PAINLEVEL: NO PAIN

## 2017-05-17 NOTE — PROGRESS NOTES
Chief Complaint   Patient presents with   • Follow-Up     HF BP       HISTORY OF PRESENT ILLNESS: Patient is a 66 y.o. female patient of nurse practitioner Richi who presents today to discuss the evaluation and management of:    1. Elevated blood pressure, situational    Approximately one week ago patient took her blood pressure at home and it was in the 170/100 range. She became very concerned and went to the emergency room for evaluation. There it was found to be in the 150 systolic over 94 range. She had an EKG done that was normal, and any treatment was deferred to her primary care provider. Since that time the patient has cut back on her caffeine intake. She had been drinking 3 or 4 cups of coffee per day and was significantly stressed. She also was eating a lot of popcorn with a lot of salt, she has reduced her sodium intake since then as well. She has been monitoring her home blood pressures and they have been gradually decreasing to the normal range. Today she is normotensive at 112/78.    2. Obesity (BMI 30-39.9)    Patient has a history of obesity for many years. She underwent gastric bypass and successfully lost almost 100 pounds. We note that she has gained 10 pounds in the past 6 weeks however. She is planning on returning to the gym 3 days a week.      Patient Active Problem List    Diagnosis Date Noted   • Elevated blood pressure, situational 05/17/2017   • STEVEN (obstructive sleep apnea) 05/05/2017   • Obesity (BMI 30-39.9) 12/14/2016   • Osteoporosis 12/15/2015   • Mild intermittent asthma without complication 02/01/2013   • Vitamin D deficiency 08/04/2010      Allergies:Pcn    Current meds including changes today  Current Outpatient Prescriptions   Medication Sig Dispense Refill   • Melatonin 5 MG Cap Take  by mouth at bedtime as needed.     • Probiotic Product (PROBIOTIC ACIDOPHILUS) Cap Take  by mouth every day.     • Mometasone Furo-Formoterol Fum (DULERA) 200-5 MCG/ACT Aerosol Inhale  by mouth.   "   • vitamin D (CHOLECALCIFEROL) 1000 UNIT TABS Take 1,000 Units by mouth every day.       No current facility-administered medications for this visit.     Social History   Substance Use Topics   • Smoking status: Never Smoker    • Smokeless tobacco: Never Used   • Alcohol Use: 0.0 oz/week     0 Standard drinks or equivalent per week      Comment: occasional     Social History     Social History Narrative       Family History   Problem Relation Age of Onset   • Diabetes Father    • Hypertension Father    • Genitourinary () Father      renal failure on dialysis   • Cancer Brother      brain tumor   • Cancer Maternal Aunt    • Breast Cancer Daughter    • Sleep Apnea Neg Hx    • Heart Attack Brother    • No Known Problems Brother    • No Known Problems Brother        Review of Systems:  No chest pain, No shortness of breath, No dyspnea on exertion. Mild asthma currently well controlled.  Gastrointestinal ROS: No abdominal pain, No nausea, vomiting, diarrhea, or constipation        Exam:    Pleasant overweight woman in no distress  Blood pressure 112/78, pulse 70, temperature 36.2 °C (97.1 °F), height 1.753 m (5' 9.02\"), weight 102.059 kg (225 lb), last menstrual period 05/17/2007, SpO2 95 %.  General:  Well nourished, well developed female in NAD affect and mood within normal limits  Head is grossly normal.  Neck: Supple without adenopathy  Pulmonary: Clear to ausculation.  Normal effort. No rales, rhonchi, or wheezing.  Cardiovascular: Regular rate and rhythm without murmur.   Extremities: no clubbing, cyanosis, or edema.  Neuro: moves all extremities symmetrically    Please note that this dictation was created using voice recognition software. I have made every reasonable attempt to correct obvious errors, but I expect that there are errors of grammar and possibly content that I did not discover before finalizing the note.    Assessment/Plan:  1. Elevated blood pressure, situational    Patient was reassured that at " this time there is no evidence that she has hypertension requiring treatment. Advise continuing to drink decaf coffee (may have 1 cup of caffeinated coffee daily) and watching her sodium intake. Resuming regular aerobic exercise and losing a little more weight will also help. Patient is willing to return to the gym on a regular basis.    2. Obesity (BMI 30-39.9)    As above patient will increase her regular aerobic exercise and watch her diet with the attempts to lose the 10 pounds that she has gained recently.  - Patient identified as having weight management issue.  Appropriate orders and counseling given.    Followup: No Follow-up on file.

## 2017-05-17 NOTE — MR AVS SNAPSHOT
"Betty Natarajan   2017 9:00 AM   Office Visit   MRN: [id number]    Department:  [facility]   Dept Phone:  [phone]    Description:  Female : 1950   Provider:  Courtney Kirkpatrick M.D.           Reason for Visit     Follow-Up HF BP      Allergies as of 2017     Allergen Noted Reactions    Pcn [Penicillins] 2009         You were diagnosed with     Elevated blood pressure, situational   [172749]       Obesity (BMI 30-39.9)   [993552]         Vital Signs     Blood Pressure Pulse Temperature Height Weight Body Mass Index    112/78 mmHg 70 36.2 °C (97.1 °F) 1.753 m (5' 9.02\") 102.059 kg (225 lb) 33.21 kg/m2    Oxygen Saturation Last Menstrual Period Smoking Status             95% 2007 Never Smoker          Basic Information     Date Of Birth Sex Race Ethnicity Preferred Language    1950 Female White Non- English      Problem List              ICD-10-CM Priority Class Noted - Resolved    Vitamin D deficiency E55.9   2010 - Present    Mild intermittent asthma without complication J45.20   2013 - Present    Osteoporosis M81.0   12/15/2015 - Present    Obesity (BMI 30-39.9) E66.9   2016 - Present    STEVEN (obstructive sleep apnea) G47.33   2017 - Present    Elevated blood pressure, situational R03.0   2017 - Present      Health Maintenance        Date Due Completion Dates    PAP SMEAR 10/28/2017 10/28/2014, 2013, 2012, 2012, 2011, 2010    MAMMOGRAM 2018, 2016, 2013, 10/29/2012, 10/25/2011, 2010, 2010, 2009, 2009, 2008, 2008, 2007, 2006, 10/19/2005, 3/2/2005, 2005    BONE DENSITY 2018, 2016, 2015 (Done), 10/29/2012, 2010    Override on 2015: Done ([facility])    COLONOSCOPY 2021    IMM DTaP/Tdap/Td Vaccine (2 - Td) 2021, 10/15/2006            Current Immunizations     13-VALENT PCV " PREVNAR 12/15/2015    Influenza TIV (IM) 11/29/2012    Influenza Vaccine 12/21/2009    Influenza Vaccine Adult HD 11/16/2016, 12/15/2015    Influenza Vaccine Quad Inj (Pf) 10/28/2014    Pneumococcal Vaccine (UF)Historical Data 12/21/2009    Pneumococcal polysaccharide vaccine (PPSV-23) 10/28/2014    SHINGLES VACCINE 10/28/2014    TD Vaccine 10/15/2006    Tdap Vaccine 8/17/2011      Below and/or attached are the medications your provider expects you to take. Review all of your home medications and newly ordered medications with your provider and/or pharmacist. Follow medication instructions as directed by your provider and/or pharmacist. Please keep your medication list with you and share with your provider. Update the information when medications are discontinued, doses are changed, or new medications (including over-the-counter products) are added; and carry medication information at all times in the event of emergency situations     Allergies:  PCN - (reactions not documented)               Medications  Valid as of: May 17, 2017 -  9:56 AM    Generic Name Brand Name Tablet Size Instructions for use    Cholecalciferol (Tab) cholecalciferol 1000 UNIT Take 1,000 Units by mouth every day.        Melatonin (Cap) Melatonin 5 MG Take  by mouth at bedtime as needed.        Mometasone Furo-Formoterol Fum (Aerosol) Mometasone Furo-Formoterol Fum 200-5 MCG/ACT Inhale  by mouth.        Probiotic Product (Cap) PROBIOTIC ACIDOPHILUS  Take  by mouth every day.        .                 Medicines prescribed today were sent to:     [facility] [address] [place] [address] [address] [address]    Phone: [phone] Fax: [phone]    Open 24 Hours?: No      Medication refill instructions:       If your prescription bottle indicates you have medication refills left, it is not necessary to call your provider’s office. Please contact your pharmacy and they will refill your medication.    If your  prescription bottle indicates you do not have any refills left, you may request refills at any time through one of the following ways: The online Iconfinder system (except Urgent Care), by calling your provider’s office, or by asking your pharmacy to contact your provider’s office with a refill request. Medication refills are processed only during regular business hours and may not be available until the next business day. Your provider may request additional information or to have a follow-up visit with you prior to refilling your medication.   *Please Note: Medication refills are assigned a new Rx number when refilled electronically. Your pharmacy may indicate that no refills were authorized even though a new prescription for the same medication is available at the pharmacy. Please request the medicine by name with the pharmacy before contacting your provider for a refill.           Iconfinder Access Code: Activation code not generated  Current Iconfinder Status: Active

## 2017-06-29 ENCOUNTER — OFFICE VISIT (OUTPATIENT)
Dept: MEDICAL GROUP | Facility: PHYSICIAN GROUP | Age: 67
End: 2017-06-29
Payer: MEDICARE

## 2017-06-29 ENCOUNTER — HOSPITAL ENCOUNTER (OUTPATIENT)
Dept: LAB | Facility: MEDICAL CENTER | Age: 67
End: 2017-06-29
Attending: NURSE PRACTITIONER
Payer: MEDICARE

## 2017-06-29 VITALS
TEMPERATURE: 97.9 F | OXYGEN SATURATION: 95 % | RESPIRATION RATE: 14 BRPM | DIASTOLIC BLOOD PRESSURE: 70 MMHG | BODY MASS INDEX: 34.07 KG/M2 | WEIGHT: 230 LBS | HEART RATE: 63 BPM | HEIGHT: 69 IN | SYSTOLIC BLOOD PRESSURE: 114 MMHG

## 2017-06-29 DIAGNOSIS — S80.12XA TRAUMATIC HEMATOMA OF LEFT LOWER LEG, INITIAL ENCOUNTER: ICD-10-CM

## 2017-06-29 DIAGNOSIS — R79.89 ELEVATED LIVER FUNCTION TESTS: ICD-10-CM

## 2017-06-29 LAB
ALBUMIN SERPL BCP-MCNC: 4 G/DL (ref 3.2–4.9)
ALBUMIN/GLOB SERPL: 1.5 G/DL
ALP SERPL-CCNC: 74 U/L (ref 30–99)
ALT SERPL-CCNC: 25 U/L (ref 2–50)
ANION GAP SERPL CALC-SCNC: 6 MMOL/L (ref 0–11.9)
AST SERPL-CCNC: 25 U/L (ref 12–45)
BILIRUB SERPL-MCNC: 0.5 MG/DL (ref 0.1–1.5)
BUN SERPL-MCNC: 19 MG/DL (ref 8–22)
CALCIUM SERPL-MCNC: 9.2 MG/DL (ref 8.5–10.5)
CHLORIDE SERPL-SCNC: 107 MMOL/L (ref 96–112)
CO2 SERPL-SCNC: 28 MMOL/L (ref 20–33)
CREAT SERPL-MCNC: 0.75 MG/DL (ref 0.5–1.4)
GFR SERPL CREATININE-BSD FRML MDRD: >60 ML/MIN/1.73 M 2
GLOBULIN SER CALC-MCNC: 2.6 G/DL (ref 1.9–3.5)
GLUCOSE SERPL-MCNC: 89 MG/DL (ref 65–99)
HBV SURFACE AG SER QL: NEGATIVE
HCV AB SER QL: NEGATIVE
POTASSIUM SERPL-SCNC: 4.2 MMOL/L (ref 3.6–5.5)
PROT SERPL-MCNC: 6.6 G/DL (ref 6–8.2)
SODIUM SERPL-SCNC: 141 MMOL/L (ref 135–145)

## 2017-06-29 PROCEDURE — 80053 COMPREHEN METABOLIC PANEL: CPT

## 2017-06-29 PROCEDURE — 99214 OFFICE O/P EST MOD 30 MIN: CPT | Performed by: NURSE PRACTITIONER

## 2017-06-29 PROCEDURE — 87340 HEPATITIS B SURFACE AG IA: CPT

## 2017-06-29 PROCEDURE — 86803 HEPATITIS C AB TEST: CPT

## 2017-06-29 PROCEDURE — 36415 COLL VENOUS BLD VENIPUNCTURE: CPT

## 2017-06-29 ASSESSMENT — PATIENT HEALTH QUESTIONNAIRE - PHQ9: CLINICAL INTERPRETATION OF PHQ2 SCORE: 3
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
## 2017-06-29 NOTE — ASSESSMENT & PLAN NOTE
Patient reports a fall 4 weeks ago in her garden and she landed on a rock on her left lower leg. She developed a hematoma with dependent bruising that has now resolved however she does have a lump on her left shin. Patient reports that it is mildly tender. She also reports that her left knee is tender in the medial aspects. Reports minor swelling. Patient has not used any ice or ibuprofen. She did try a knee brace that increase the pain. Patient is able to continue to work in her garden at this time.
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
## 2017-07-25 ENCOUNTER — HOSPITAL ENCOUNTER (OUTPATIENT)
Facility: MEDICAL CENTER | Age: 67
End: 2017-07-25
Attending: NURSE PRACTITIONER
Payer: MEDICARE

## 2017-07-25 ENCOUNTER — OFFICE VISIT (OUTPATIENT)
Dept: MEDICAL GROUP | Facility: PHYSICIAN GROUP | Age: 67
End: 2017-07-25
Payer: MEDICARE

## 2017-07-25 VITALS
DIASTOLIC BLOOD PRESSURE: 80 MMHG | RESPIRATION RATE: 14 BRPM | BODY MASS INDEX: 33.77 KG/M2 | HEART RATE: 64 BPM | HEIGHT: 69 IN | SYSTOLIC BLOOD PRESSURE: 130 MMHG | WEIGHT: 228 LBS | OXYGEN SATURATION: 95 % | TEMPERATURE: 97 F

## 2017-07-25 DIAGNOSIS — N39.41 URGE INCONTINENCE OF URINE: ICD-10-CM

## 2017-07-25 DIAGNOSIS — Z01.419 WELL WOMAN EXAM WITH ROUTINE GYNECOLOGICAL EXAM: ICD-10-CM

## 2017-07-25 DIAGNOSIS — N83.209 CYST OF OVARY, UNSPECIFIED LATERALITY: ICD-10-CM

## 2017-07-25 DIAGNOSIS — Z12.4 SCREENING FOR CERVICAL CANCER: ICD-10-CM

## 2017-07-25 DIAGNOSIS — Z87.442 HISTORY OF KIDNEY STONES: ICD-10-CM

## 2017-07-25 LAB — CYTOLOGY REG CYTOL: NORMAL

## 2017-07-25 PROCEDURE — 88175 CYTOPATH C/V AUTO FLUID REDO: CPT

## 2017-07-25 PROCEDURE — G0101 CA SCREEN;PELVIC/BREAST EXAM: HCPCS | Performed by: NURSE PRACTITIONER

## 2017-07-25 RX ORDER — OXYBUTYNIN CHLORIDE 5 MG/1
5 TABLET ORAL 3 TIMES DAILY
Qty: 90 TAB | Refills: 1 | Status: SHIPPED | OUTPATIENT
Start: 2017-07-25 | End: 2017-09-27 | Stop reason: SDUPTHER

## 2017-07-25 NOTE — ASSESSMENT & PLAN NOTE
Patient reports that why she was on vacation last month she had a right kidney stone. She was in [facility]. She passed the stone. During the CT scan they noted she had a ovarian cyst. Patient does state that in the past she did have an ultrasound that showed a left ovarian cyst. She denies abdominal or flank pain at this time. She denies any dysuria or blood in her urine.

## 2017-07-25 NOTE — PROGRESS NOTES
CC:  Pap/Well Woman Exam    History of present illness:  Betty Natarajan is 67 y.o. white female presenting today for well woman exam with gynecological exam and Pap smear.   She reports her periods are menopausal.Diet . Exercise walking most days.    History of kidney stones  Patient reports that why she was on vacation last month she had a right kidney stone. She was in [facility]. She passed the stone. During the CT scan they noted she had a ovarian cyst. Patient does state that in the past she did have an ultrasound that showed a left ovarian cyst. She denies abdominal or flank pain at this time. She denies any dysuria or blood in her urine.    Well woman exam with routine gynecological exam  Patient is here today for well woman exam. States that all of her Pap smears in the past been normal. She denies any changes in her breasts. She is up-to-date on her mammograms. She does have a strong family history of cancer in her family including a daughter who had bilateral mastectomies. She denies any changes to her breast tissues. She denies any vaginal pain bleeding abdominal discomfort vaginal discharge or lesions.    Urge incontinence of urine  Patient reports that she is having some urge incontinence throughout the day. She states that she is having difficulty getting to the restroom in time. She is worried because she is decreasing her fluid intake in order to avoid this issue. Denies difficulty starting or her stream. Denies pain or discomfort with urination. Denies fever or chills.      Past Medical History   Diagnosis Date   • Depression    • Asthma    • Pap smear    • Bronchitis    • Positive PPD    • Influenza    • Tonsillitis        Past Surgical History   Procedure Laterality Date   • Hemorrhoidectomy  5/08   • Nodule excision       Performed by EDELMIRA MONTE at SURGERY [facility] ORS   • Gastric bypass laparoscopic  2001   • Colonoscopy  2005     normal   • Us-needle core bx-breast panel     •  Colonoscopy  2/17/2016     internal hemorrhoids   • Pr remv 2nd cataract,corn-scler sectn     • Tonsillectomy         Outpatient Encounter Prescriptions as of 7/25/2017   Medication Sig Dispense Refill   • oxybutynin (DITROPAN) 5 MG Tab Take 1 Tab by mouth 3 times a day. 90 Tab 1   • Melatonin 5 MG Cap Take  by mouth at bedtime as needed.     • Probiotic Product (PROBIOTIC ACIDOPHILUS) Cap Take  by mouth every day.     • Mometasone Furo-Formoterol Fum (DULERA) 200-5 MCG/ACT Aerosol Inhale  by mouth.     • vitamin D (CHOLECALCIFEROL) 1000 UNIT TABS Take 1,000 Units by mouth every day.       No facility-administered encounter medications on file as of 7/25/2017.       Patient Active Problem List    Diagnosis Date Noted   • History of kidney stones 07/25/2017   • Urge incontinence of urine 07/25/2017   • Well woman exam with routine gynecological exam 07/25/2017   • Traumatic hematoma of left lower leg 06/29/2017   • Elevated liver function tests 06/29/2017   • Elevated blood pressure, situational 05/17/2017   • STEVEN (obstructive sleep apnea) 05/05/2017   • Obesity (BMI 30-39.9) 12/14/2016   • Osteoporosis 12/15/2015   • Mild intermittent asthma without complication 02/01/2013   • Vitamin D deficiency 08/04/2010       .  Social History     Social History   • Marital Status:      Spouse Name: N/A   • Number of Children: N/A   • Years of Education: N/A     Occupational History   • retired      Social History Main Topics   • Smoking status: Never Smoker    • Smokeless tobacco: Never Used   • Alcohol Use: 0.0 oz/week     0 Standard drinks or equivalent per week      Comment: occasional   • Drug Use: No   • Sexual Activity: Not on file     Other Topics Concern   • Not on file     Social History Narrative       Family History   Problem Relation Age of Onset   • Diabetes Father    • Hypertension Father    • Genitourinary () Father      renal failure on dialysis   • Cancer Brother      brain tumor   • Cancer Maternal  "Aunt    • Breast Cancer Daughter    • Sleep Apnea Neg Hx    • Heart Attack Brother    • No Known Problems Brother    • No Known Problems Brother          ROS:  Denies Weight loss, fatigue, chest pain, SOB, bowel. Positive for urge incontinence reported. Also recent right kidney stone. No significant dysmenorrhea, concerning vaginal discharge or irritation, no dyspareunia or postcoital bleeding. Denies h/o migraine with aura. Denies musculoskeletal, neurological, or psychiatric problems.      /80 mmHg  Pulse 64  Temp(Src) 36.1 °C (97 °F)  Resp 14  Ht 1.753 m (5' 9\")  Wt 103.42 kg (228 lb)  BMI 33.65 kg/m2  SpO2 95%  LMP 05/17/2007    GEN:  Appears well and in no apparent distress   NECK:  Supple without adenopathy or thyromegaly  LUNGS:  Clear and equal. No wheeze, ronchi, or rales.  CV:  RRR, S1, S2. No murmur.  Pedal pulses 2+ bilaterally.  BREAST:  Symmetrical without masses. No nipple discharge.  ABD:  Soft, non-tender, non-distended, normal bowel sounds.  No hepatosplenomegaly.  :  Normal external female genitalia.  Vaginal canal clear.  Cervix appears normal. Specimen collected from transformation zone. Bimanual exam:  No CMT, normal size uterus without masses or tenderness; no adnexal masses or tenderness.      Assessment and plan    1. History of kidney stones  This is a new problem to me. Acute. Stable. Reviewed signs and symptoms. Also encouraged her to increase her fluid intake.    2. Screening for cervical cancer  Pap obtained. We'll monitor results.  - THINPREP PAP,REFLEX HPV ON ASC-US ONLY; Future    3. Cyst of ovary, unspecified laterality  This is a new problem to me. This was found on CT scan in [place] when she was being treated for her kidney stone. We will follow-up with a transvaginal ultrasound. Monitor and follow results.  - US-GYN-PELVIS TRANSVAGINAL; Future    4. Urge incontinence of urine  This is a new problem to me. Chronic. Ongoing. She'll do a trial of oxybutynin 5 mg a.m. " and p.m. to see if this improves her incontinence. Patient to return in 4 weeks for follow-up.    5. Well woman exam with routine gynecological exam  Limited exam today with routine gynecological and breast exam. All within normal limits. We will await results of the Pap smear.      F/u pending results

## 2017-07-25 NOTE — ASSESSMENT & PLAN NOTE
Patient is here today for well woman exam. States that all of her Pap smears in the past been normal. She denies any changes in her breasts. She is up-to-date on her mammograms. She does have a strong family history of cancer in her family including a daughter who had bilateral mastectomies. She denies any changes to her breast tissues. She denies any vaginal pain bleeding abdominal discomfort vaginal discharge or lesions.

## 2017-07-25 NOTE — MR AVS SNAPSHOT
"        Betty Natarajan   2017 10:40 AM   Office Visit   MRN: [id number]    Department:  [facility]   Dept Phone:  [phone]    Description:  Female : 1950   Provider:  BRANDO Garrido           Reason for Visit     Annual Exam           Allergies as of 2017     Allergen Noted Reactions    Pcn [Penicillins] 2009         You were diagnosed with     History of kidney stones   [895394]       Screening for cervical cancer   [815082]       Cyst of ovary, unspecified laterality   [5364883]       Urge incontinence of urine   [864766]         Vital Signs     Blood Pressure Pulse Temperature Respirations Height Weight    130/80 mmHg 64 36.1 °C (97 °F) 14 1.753 m (5' 9\") 103.42 kg (228 lb)    Body Mass Index Oxygen Saturation Last Menstrual Period Smoking Status          33.65 kg/m2 95% 2007 Never Smoker         Basic Information     Date Of Birth Sex Race Ethnicity Preferred Language    1950 Female White Non- English      Your appointments     Sep 27, 2017  8:40 AM   Established Patient with BRANDO Garrido   [facility]    [address] [place] [id number] [phone]           You will be receiving a confirmation call a few days before your appointment from our automated call confirmation system.              Problem List              ICD-10-CM Priority Class Noted - Resolved    Vitamin D deficiency E55.9   2010 - Present    Mild intermittent asthma without complication J45.20   2013 - Present    Osteoporosis M81.0   12/15/2015 - Present    Obesity (BMI 30-39.9) E66.9   2016 - Present    STEVEN (obstructive sleep apnea) G47.33   2017 - Present    Elevated blood pressure, situational R03.0   2017 - Present    Traumatic hematoma of left lower leg S80.12XA   2017 - Present    Elevated liver function tests R94.5   2017 - Present    History of kidney stones Z87.442   2017 - Present    Urge " incontinence of urine N39.41   7/25/2017 - Present      Health Maintenance        Date Due Completion Dates    IMM INFLUENZA (1) 9/1/2017 11/16/2016, 12/15/2015, 10/28/2014, 11/29/2012    PAP SMEAR 10/28/2017 10/28/2014, 9/19/2013, 11/29/2012, 8/30/2012, 8/17/2011, 8/2/2010    MAMMOGRAM 1/16/2018 1/16/2017, 1/6/2016, 11/5/2013, 10/29/2012, 10/25/2011, 8/9/2010, 8/9/2010, 4/23/2009, 4/23/2009, 4/21/2008, 4/21/2008, 4/20/2007, 4/19/2006, 10/19/2005, 3/2/2005, 2/18/2005    BONE DENSITY 1/16/2018 1/16/2017, 1/6/2016, 1/30/2015 (Done), 10/29/2012, 8/9/2010    Override on 1/30/2015: Done ([facility])    COLONOSCOPY 2/17/2021 2/17/2016    IMM DTaP/Tdap/Td Vaccine (2 - Td) 8/17/2021 8/17/2011, 10/15/2006            Current Immunizations     13-VALENT PCV PREVNAR 12/15/2015    Influenza TIV (IM) 11/29/2012    Influenza Vaccine 12/21/2009    Influenza Vaccine Adult HD 11/16/2016, 12/15/2015    Influenza Vaccine Quad Inj (Pf) 10/28/2014    Pneumococcal Vaccine (UF)Historical Data 12/21/2009    Pneumococcal polysaccharide vaccine (PPSV-23) 10/28/2014    SHINGLES VACCINE 10/28/2014    TD Vaccine 10/15/2006    Tdap Vaccine 8/17/2011      Below and/or attached are the medications your provider expects you to take. Review all of your home medications and newly ordered medications with your provider and/or pharmacist. Follow medication instructions as directed by your provider and/or pharmacist. Please keep your medication list with you and share with your provider. Update the information when medications are discontinued, doses are changed, or new medications (including over-the-counter products) are added; and carry medication information at all times in the event of emergency situations     Allergies:  PCN - (reactions not documented)               Medications  Valid as of: July 25, 2017 - 11:16 AM    Generic Name Brand Name Tablet Size Instructions for use    Cholecalciferol (Tab) cholecalciferol 1000 UNIT Take  1,000 Units by mouth every day.        Melatonin (Cap) Melatonin 5 MG Take  by mouth at bedtime as needed.        Mometasone Furo-Formoterol Fum (Aerosol) Mometasone Furo-Formoterol Fum 200-5 MCG/ACT Inhale  by mouth.        Oxybutynin Chloride (Tab) DITROPAN 5 MG Take 1 Tab by mouth 3 times a day.        Probiotic Product (Cap) PROBIOTIC ACIDOPHILUS  Take  by mouth every day.        .                 Medicines prescribed today were sent to:     [id number], [place] - [address] [address]    Phone: [phone] Fax: [phone]    Open 24 Hours?: No      Medication refill instructions:       If your prescription bottle indicates you have medication refills left, it is not necessary to call your provider’s office. Please contact your pharmacy and they will refill your medication.    If your prescription bottle indicates you do not have any refills left, you may request refills at any time through one of the following ways: The online [employer] system (except Urgent Care), by calling your provider’s office, or by asking your pharmacy to contact your provider’s office with a refill request. Medication refills are processed only during regular business hours and may not be available until the next business day. Your provider may request additional information or to have a follow-up visit with you prior to refilling your medication.   *Please Note: Medication refills are assigned a new Rx number when refilled electronically. Your pharmacy may indicate that no refills were authorized even though a new prescription for the same medication is available at the pharmacy. Please request the medicine by name with the pharmacy before contacting your provider for a refill.        Your To Do List     Future Labs/Procedures Complete By Expires    THINPREP PAP,REFLEX HPV ON ASC-US ONLY  As directed 7/26/2018    US-GYN-PELVIS TRANSVAGINAL  As directed 1/25/2018         [email or web address]  Access Code: Activation code not generated  Current MyChart Status: Active

## 2017-07-25 NOTE — ASSESSMENT & PLAN NOTE
Patient reports that she is having some urge incontinence throughout the day. She states that she is having difficulty getting to the restroom in time. She is worried because she is decreasing her fluid intake in order to avoid this issue. Denies difficulty starting or her stream. Denies pain or discomfort with urination. Denies fever or chills.

## 2017-07-31 ENCOUNTER — TELEPHONE (OUTPATIENT)
Dept: MEDICAL GROUP | Facility: PHYSICIAN GROUP | Age: 67
End: 2017-07-31

## 2017-07-31 NOTE — Clinical Note
"July 31, 2017         Cheryl Natarajan  [address] [place] [address]        Dear Cheryl:      Below are the results from your recent visit:    Please notify patient that her PAP results are back and there is no evidence of infection or malignancy.  Recommend repeat testing in 2 years.    Resulted Orders   THINPREP PAP,REFLEX HPV ON ASC-US ONLY   Result Value Ref Range    Cytology Reg See Path Report       Comment:      A separate pathology report will follow after the Cytology  specimen has been received by the laboratory and the results  are signed out by a pathologist.  Please see \"Pathology GYN Specimen\" order for these results.      Narrative    Clinical Information:->01 Routine Check-up   PATHOLOGY GYN SPECIMEN    Narrative    [facility]  DEPARTMENT OF PATHOLOGY  [address] [place] [address]  [address]  Phone [phone]          Fax [phone]  Eugene Rico M.D.     Guerita De Leon M.D.     Melanie Palumbo M.D.  Guerita Barbour M.D.     Donovan Guzman M.D.     Wero Cruz M.D.  Patient:    CHERYL NATARAJAN            Specimen    [id number]  #:  Copies to:  CERVICOVAGINAL CYTOLOGY REPORT  INTERPRETATION:  NEGATIVE FOR INTRAEPITHELIAL LESION OR MALIGNANCY.  Reactive cellular changes.  Atrophy.  SPECIMEN ADEQUACY:  Satisfactory for Evaluation.  Endocervical cells/transformation zone  component present.  Report electronically signed by:  GUERITA BARBOUR MD  ------------------------------------------------------------------------  N72, Z12.4  This liquid based ThinPrep Pap test is screened with the use of an  image guided system.  Cervicovaginal Papanicolaou (Pap) smears are  screening tests with a well-documented false-negative rate.  A single  negative test is significantly less accurate than multiple negative  tests in successive screening periods.  Report electronically signed by:  GUERITA BARBOUR MD  Diagnosis performed at: [facility]  Pathology  Department, " [address] [place]  [address]  Printed 00/00/0000 [id number] CHERYL JONES  Page 1 of 1 MRN#:[id number]     If you have any questions or concerns, please don't hesitate to call.        Sincerely,      DARIUS Garrido.    Electronically Signed

## 2017-07-31 NOTE — TELEPHONE ENCOUNTER
----- Message from BRANDO Garrido sent at 7/31/2017  7:24 AM PDT -----  Please notify patient that her PAP results are back and there is no evidence of infection or malignancy.  Recommend repeat testing in 2 years.

## 2017-08-08 ENCOUNTER — HOSPITAL ENCOUNTER (OUTPATIENT)
Dept: RADIOLOGY | Facility: MEDICAL CENTER | Age: 67
End: 2017-08-08
Attending: NURSE PRACTITIONER
Payer: MEDICARE

## 2017-08-08 ENCOUNTER — TELEPHONE (OUTPATIENT)
Dept: MEDICAL GROUP | Facility: PHYSICIAN GROUP | Age: 67
End: 2017-08-08

## 2017-08-08 DIAGNOSIS — N83.209 CYST OF OVARY, UNSPECIFIED LATERALITY: ICD-10-CM

## 2017-08-08 PROCEDURE — 76830 TRANSVAGINAL US NON-OB: CPT

## 2017-08-08 NOTE — TELEPHONE ENCOUNTER
"----- Message from BRANDO Garrido sent at 8/8/2017  1:13 PM PDT -----  Betty  The transvaginal and transabdominal ultrasound did not show your ovaries.  After menopause, they often shrink and are not visualized with ultrasound.  The uterus is normal in size/shape.  The ultrasound did not show any \"free\" fluid which could represent a ruptured cyst.  At this point, without symptoms, I think we just watch.  Let me know if you are good with that.  BRANDO Garrido    "

## 2017-09-27 ENCOUNTER — OFFICE VISIT (OUTPATIENT)
Dept: MEDICAL GROUP | Facility: PHYSICIAN GROUP | Age: 67
End: 2017-09-27
Payer: MEDICARE

## 2017-09-27 VITALS
OXYGEN SATURATION: 95 % | BODY MASS INDEX: 33.77 KG/M2 | SYSTOLIC BLOOD PRESSURE: 132 MMHG | DIASTOLIC BLOOD PRESSURE: 90 MMHG | WEIGHT: 228 LBS | HEART RATE: 74 BPM | TEMPERATURE: 98.8 F | HEIGHT: 69 IN

## 2017-09-27 DIAGNOSIS — N39.41 URGE INCONTINENCE OF URINE: ICD-10-CM

## 2017-09-27 DIAGNOSIS — Z23 NEED FOR VACCINATION: ICD-10-CM

## 2017-09-27 PROCEDURE — 99213 OFFICE O/P EST LOW 20 MIN: CPT | Mod: 25 | Performed by: NURSE PRACTITIONER

## 2017-09-27 PROCEDURE — G0008 ADMIN INFLUENZA VIRUS VAC: HCPCS | Performed by: NURSE PRACTITIONER

## 2017-09-27 PROCEDURE — 90662 IIV NO PRSV INCREASED AG IM: CPT | Performed by: NURSE PRACTITIONER

## 2017-09-27 RX ORDER — OXYBUTYNIN CHLORIDE 5 MG/1
5 TABLET ORAL 3 TIMES DAILY
Qty: 90 TAB | Refills: 1 | Status: SHIPPED | OUTPATIENT
Start: 2017-09-27 | End: 2018-03-06 | Stop reason: SDUPTHER

## 2017-09-27 RX ORDER — ALBUTEROL SULFATE 90 UG/1
2 AEROSOL, METERED RESPIRATORY (INHALATION) EVERY 6 HOURS PRN
COMMUNITY

## 2017-09-27 NOTE — PROGRESS NOTES
"Chief Complaint   Patient presents with   • Follow-Up   • Incontinence   • Flu Vaccine       Subjective:   Betty Natarajan is a 67 y.o. female here today for evaluation and management of:    Urge incontinence of urine  Patient currently taking oxybutynin twice a day. She is taking it morning and night. She states that she is having dry mouth especially at nighttime. She has noticed some improvement in her incontinence that stated that yesterday she had a lot of incontinent issues she did drink 3-4 cups of coffee yesterday.           Current medicines (including changes today)  Current Outpatient Prescriptions   Medication Sig Dispense Refill   • oxybutynin (DITROPAN) 5 MG Tab Take 1 Tab by mouth 3 times a day. 90 Tab 1   • Melatonin 5 MG Cap Take  by mouth at bedtime as needed.     • Probiotic Product (PROBIOTIC ACIDOPHILUS) Cap Take  by mouth every day.     • Mometasone Furo-Formoterol Fum (DULERA) 200-5 MCG/ACT Aerosol Inhale  by mouth.     • vitamin D (CHOLECALCIFEROL) 1000 UNIT TABS Take 1,000 Units by mouth every day.     • albuterol 108 (90 Base) MCG/ACT Aero Soln inhalation aerosol Inhale 2 Puffs by mouth every 6 hours as needed for Shortness of Breath.       No current facility-administered medications for this visit.      She  has a past medical history of Asthma; Bronchitis; Depression; Influenza; Pap smear; Positive PPD; and Tonsillitis.    Stewart stated in history of present illness.  No chest pain, no shortness of breath, no abdominal pain       Objective:     Blood pressure 132/90, pulse 74, temperature 37.1 °C (98.8 °F), height 1.753 m (5' 9\"), weight 103.4 kg (228 lb), last menstrual period 05/17/2007, SpO2 95 %. Body mass index is 33.67 kg/m².   Stable  Physical Exam:  Constitutional: Alert, no distress.  Skin: Warm, dry, good turgor,no cyanosis, no rashes in visible areas. Patient has a new puppy and has several small skin tears from her puppies nails. No swelling or exudate noted.  Eye: Equal, " round and reactive, conjunctiva clear, lids normal.  Ears: No tenderness, no discharge.  External canals are without any significant edema or erythema.   Gross auditory acuity is intact.  Nose: symmetrical without tenderness, no discharge.  Mouth/Throat: lips without lesion.  Oropharynx clear.  T  Neck: Trachea midline, no masses, no obvious thyroid enlargement.. . Range of motion within normal limits.  Neuro: Cranial nerves 2-12 grossly intact.  No sensory deficit.  Respiratory: Unlabored respiratory effort, lungs clear to auscultation, no wheezes, no ronchi.  Cardiovascular: Normal S1, S2, no murmur, no edema.  Psych: Alert and oriented x3, normal affect and mood and judgement.        Assessment and Plan:   The following treatment plan was discussed    1. Need for vaccination  Patient requesting flu vaccine today. No acute illness. Consent obtained.I have placed the below orders and discussed them with an approved delegating provider.  The MA is performing the below orders under the direction of Dr. Columba Juan, D.O.    - INFLUENZA VACCINE, HIGH DOSE (65+ ONLY)    2. Urge incontinence of urine  Chronic. Improving. Patient to try taking the oxybutynin morning and late afternoon rather than nighttime to see if this improves her symptoms. Also discussed with patient watching coffee intake. She verbalizes understanding. She will let me know in the next month or 2 how this is going. Continue to monitor and follow.      Followup: Return in about 1 year (around 9/27/2018), or if symptoms worsen or fail to improve.

## 2017-09-27 NOTE — ASSESSMENT & PLAN NOTE
Patient currently taking oxybutynin twice a day. She is taking it morning and night. She states that she is having dry mouth especially at nighttime. She has noticed some improvement in her incontinence that stated that yesterday she had a lot of incontinent issues she did drink 3-4 cups of coffee yesterday.

## 2017-10-11 ENCOUNTER — PATIENT MESSAGE (OUTPATIENT)
Dept: MEDICAL GROUP | Facility: PHYSICIAN GROUP | Age: 67
End: 2017-10-11

## 2017-11-08 ENCOUNTER — PATIENT MESSAGE (OUTPATIENT)
Dept: MEDICAL GROUP | Facility: PHYSICIAN GROUP | Age: 67
End: 2017-11-08

## 2017-11-08 DIAGNOSIS — Z00.00 PREVENTATIVE HEALTH CARE: ICD-10-CM

## 2018-01-18 ENCOUNTER — HOSPITAL ENCOUNTER (OUTPATIENT)
Dept: RADIOLOGY | Facility: MEDICAL CENTER | Age: 68
End: 2018-01-18
Attending: NURSE PRACTITIONER
Payer: MEDICARE

## 2018-01-18 DIAGNOSIS — Z12.31 ENCOUNTER FOR SCREENING MAMMOGRAM FOR MALIGNANT NEOPLASM OF BREAST: ICD-10-CM

## 2018-01-18 PROCEDURE — 77067 SCR MAMMO BI INCL CAD: CPT

## 2018-01-19 ENCOUNTER — TELEPHONE (OUTPATIENT)
Dept: MEDICAL GROUP | Facility: PHYSICIAN GROUP | Age: 68
End: 2018-01-19

## 2018-01-19 DIAGNOSIS — Z78.0 POST-MENOPAUSE: ICD-10-CM

## 2018-01-20 NOTE — TELEPHONE ENCOUNTER
Good afternoon Dinesh Allen is requesting an order for Dexa. Please advise.    Thank you,    Nena Vargas [facility]

## 2018-01-22 NOTE — TELEPHONE ENCOUNTER
Order for Dexa signed.   Please call [phone] to schedule at the [facility].  DARIUS Garrido.

## 2018-02-01 ENCOUNTER — HOSPITAL ENCOUNTER (OUTPATIENT)
Dept: RADIOLOGY | Facility: MEDICAL CENTER | Age: 68
End: 2018-02-01
Attending: NURSE PRACTITIONER
Payer: MEDICARE

## 2018-02-01 DIAGNOSIS — Z78.0 POST-MENOPAUSE: ICD-10-CM

## 2018-02-01 PROCEDURE — 77080 DXA BONE DENSITY AXIAL: CPT

## 2018-02-02 PROBLEM — M81.0 AGE-RELATED OSTEOPOROSIS WITHOUT CURRENT PATHOLOGICAL FRACTURE: Status: ACTIVE | Noted: 2018-02-02

## 2018-03-06 ENCOUNTER — PATIENT MESSAGE (OUTPATIENT)
Dept: MEDICAL GROUP | Facility: PHYSICIAN GROUP | Age: 68
End: 2018-03-06

## 2018-03-06 RX ORDER — OXYBUTYNIN CHLORIDE 5 MG/1
5 TABLET ORAL 3 TIMES DAILY
Qty: 90 TAB | Refills: 3 | Status: SHIPPED | OUTPATIENT
Start: 2018-03-06 | End: 2018-10-09

## 2018-10-09 ENCOUNTER — OFFICE VISIT (OUTPATIENT)
Dept: MEDICAL GROUP | Facility: PHYSICIAN GROUP | Age: 68
End: 2018-10-09
Payer: MEDICARE

## 2018-10-09 VITALS
DIASTOLIC BLOOD PRESSURE: 80 MMHG | WEIGHT: 217 LBS | RESPIRATION RATE: 14 BRPM | SYSTOLIC BLOOD PRESSURE: 130 MMHG | HEIGHT: 69 IN | HEART RATE: 66 BPM | TEMPERATURE: 97.5 F | BODY MASS INDEX: 32.14 KG/M2 | OXYGEN SATURATION: 96 %

## 2018-10-09 DIAGNOSIS — Z23 NEED FOR VACCINATION: ICD-10-CM

## 2018-10-09 DIAGNOSIS — R03.0 ELEVATED BLOOD PRESSURE, SITUATIONAL: ICD-10-CM

## 2018-10-09 DIAGNOSIS — E66.9 OBESITY (BMI 30-39.9): ICD-10-CM

## 2018-10-09 DIAGNOSIS — R79.89 ELEVATED LIVER FUNCTION TESTS: ICD-10-CM

## 2018-10-09 DIAGNOSIS — Z00.00 PREVENTATIVE HEALTH CARE: ICD-10-CM

## 2018-10-09 DIAGNOSIS — J45.20 MILD INTERMITTENT ASTHMA WITHOUT COMPLICATION: ICD-10-CM

## 2018-10-09 DIAGNOSIS — R05.9 COUGH: ICD-10-CM

## 2018-10-09 PROCEDURE — G0008 ADMIN INFLUENZA VIRUS VAC: HCPCS | Performed by: NURSE PRACTITIONER

## 2018-10-09 PROCEDURE — 99214 OFFICE O/P EST MOD 30 MIN: CPT | Mod: 25 | Performed by: NURSE PRACTITIONER

## 2018-10-09 PROCEDURE — 90662 IIV NO PRSV INCREASED AG IM: CPT | Performed by: NURSE PRACTITIONER

## 2018-10-09 ASSESSMENT — PATIENT HEALTH QUESTIONNAIRE - PHQ9: CLINICAL INTERPRETATION OF PHQ2 SCORE: 0

## 2018-10-09 NOTE — ASSESSMENT & PLAN NOTE
Reports that she started to have cough in august with a cold.  Having some residual cough. No fever, chills or colored sputum.  No exacerbation of asthma.  Taking dulera bid last time she used albuterol was one week ago.

## 2018-10-09 NOTE — PROGRESS NOTES
"Chief Complaint   Patient presents with   • Cough   • Letter for School/Work   • Orders Needed   • Flu Vaccine       Subjective:   Betty Natarajan is a 68 y.o. female here today for evaluation and management of:    Cough  Reports that she started to have cough in august with a cold.  Having some residual cough. No fever, chills or colored sputum.  No exacerbation of asthma.  Taking dulera bid last time she used albuterol was one week ago.     Elevated liver function tests  Due for lab review.  Labs ordered.  No abdominal pain reported.      Elevated blood pressure, situational  Blood pressure 130/80 today.  No medications.  No symptoms.      Obesity (BMI 30-39.9)  BMI 32.05 today.  This is down 11 pounds over the past year.  Walking    Mild intermittent asthma without complication  Dulera daily.  Stable at this time.  Albuterol use is minimal.  No wheezing.              Current medicines (including changes today)  Current Outpatient Prescriptions   Medication Sig Dispense Refill   • Melatonin 5 MG Cap Take  by mouth at bedtime as needed.     • Probiotic Product (PROBIOTIC ACIDOPHILUS) Cap Take  by mouth every day.     • Mometasone Furo-Formoterol Fum (DULERA) 200-5 MCG/ACT Aerosol Inhale  by mouth.     • vitamin D (CHOLECALCIFEROL) 1000 UNIT TABS Take 1,000 Units by mouth every day.     • albuterol 108 (90 Base) MCG/ACT Aero Soln inhalation aerosol Inhale 2 Puffs by mouth every 6 hours as needed for Shortness of Breath.       No current facility-administered medications for this visit.      She  has a past medical history of Asthma; Bronchitis; Depression; Influenza; Pap smear; Positive PPD; and Tonsillitis.    ROS as stated in hpi  No chest pain, no shortness of breath, no abdominal pain       Objective:     Blood pressure 130/80, pulse 66, temperature 36.4 °C (97.5 °F), temperature source Temporal, resp. rate 14, height 1.753 m (5' 9\"), weight 98.4 kg (217 lb), last menstrual period 05/17/2007, SpO2 96 %, not " currently breastfeeding. Body mass index is 32.05 kg/m². afebrile  Physical Exam:  Constitutional: Alert, no distress.  Skin: Warm, dry, good turgor,no cyanosis, no rashes in visible areas.  Eye: Equal, round and reactive, conjunctiva clear, lids normal.  Ears: No tenderness, no discharge.  External canals are without any significant edema or erythema.    Gross auditory acuity is intact.  Nose: symmetrical without tenderness, no discharge.  Mouth/Throat: lips without lesion.  Oropharynx clear.   Neck: Trachea midline, no masses, no obvious thyroid enlargement.. No cervical or supraclavicular lymphadenopathy. Range of motion within normal limits.  Neuro: Cranial nerves 2-12 grossly intact.  No sensory deficit.  Respiratory: Unlabored respiratory effort, lungs clear to auscultation, no wheezes, no ronchi.  Cardiovascular: Normal S1, S2, no murmur, no edema.  Psych: Alert and oriented x3, normal affect and mood and judgement.        Assessment and Plan:   The following treatment plan was discussed    1. Cough  This is a new problem to me.  Chronic for one month.  Most likely represents allergies.  Discussed claritan as a trial.  Continue with dulera/albuterol prn. Monitor and follow.     2. Need for vaccination  Need for vaccine.  No acute illness  I have placed the below orders and discussed them with an approved delegating provider.  The MA is performing the below orders under the direction of Dr. Stevie MD.    - INFLUENZA VACCINE, HIGH DOSE (65+ ONLY)    3. Preventative health care  Due for labs.  Orders provided.  Monitor and follow results.   - COMP METABOLIC PANEL; Future  - HEMOGLOBIN A1C; Future  - CBC WITH DIFFERENTIAL; Future  - LIPID PROFILE; Future    4. Elevated liver function tests  Chronic, ongoing.  CMP ordered. Monitor and follow.     5. Elevated blood pressure, situational  Chronic, ongoing. At goal.  Continue to work on weight loss    6. Obesity (BMI 30-39.9)  Chronic, ongoing. Improved.  Continue  weight loss goals/plan    7. Mild intermittent asthma without complication  Chronic, ongoing, followed by pulmonology.  Stable.        Followup: Return if symptoms worsen or fail to improve.         Educated in proper administration of medication(s) ordered today including safety, possible SE, risks, benefits, rationale and alternatives to therapy.     Please note that this dictation was created using voice recognition software. I have made every reasonable attempt to correct obvious errors, but I expect that there are errors of grammar and possibly content that I did not discover before finalizing the note.

## 2018-10-10 ENCOUNTER — HOSPITAL ENCOUNTER (OUTPATIENT)
Dept: LAB | Facility: MEDICAL CENTER | Age: 68
End: 2018-10-10
Attending: NURSE PRACTITIONER
Payer: MEDICARE

## 2018-10-10 DIAGNOSIS — Z00.00 PREVENTATIVE HEALTH CARE: ICD-10-CM

## 2018-10-10 LAB
ALBUMIN SERPL BCP-MCNC: 4.1 G/DL (ref 3.2–4.9)
ALBUMIN/GLOB SERPL: 1.6 G/DL
ALP SERPL-CCNC: 68 U/L (ref 30–99)
ALT SERPL-CCNC: 28 U/L (ref 2–50)
ANION GAP SERPL CALC-SCNC: 8 MMOL/L (ref 0–11.9)
AST SERPL-CCNC: 24 U/L (ref 12–45)
BASOPHILS # BLD AUTO: 0.6 % (ref 0–1.8)
BASOPHILS # BLD: 0.03 K/UL (ref 0–0.12)
BILIRUB SERPL-MCNC: 0.7 MG/DL (ref 0.1–1.5)
BUN SERPL-MCNC: 15 MG/DL (ref 8–22)
CALCIUM SERPL-MCNC: 9.8 MG/DL (ref 8.5–10.5)
CHLORIDE SERPL-SCNC: 105 MMOL/L (ref 96–112)
CHOLEST SERPL-MCNC: 146 MG/DL (ref 100–199)
CO2 SERPL-SCNC: 26 MMOL/L (ref 20–33)
CREAT SERPL-MCNC: 0.73 MG/DL (ref 0.5–1.4)
EOSINOPHIL # BLD AUTO: 0.08 K/UL (ref 0–0.51)
EOSINOPHIL NFR BLD: 1.5 % (ref 0–6.9)
ERYTHROCYTE [DISTWIDTH] IN BLOOD BY AUTOMATED COUNT: 45.1 FL (ref 35.9–50)
EST. AVERAGE GLUCOSE BLD GHB EST-MCNC: 114 MG/DL
FASTING STATUS PATIENT QL REPORTED: NORMAL
GLOBULIN SER CALC-MCNC: 2.5 G/DL (ref 1.9–3.5)
GLUCOSE SERPL-MCNC: 99 MG/DL (ref 65–99)
HBA1C MFR BLD: 5.6 % (ref 0–5.6)
HCT VFR BLD AUTO: 42.9 % (ref 37–47)
HDLC SERPL-MCNC: 66 MG/DL
HGB BLD-MCNC: 13.8 G/DL (ref 12–16)
IMM GRANULOCYTES # BLD AUTO: 0.01 K/UL (ref 0–0.11)
IMM GRANULOCYTES NFR BLD AUTO: 0.2 % (ref 0–0.9)
LDLC SERPL CALC-MCNC: 61 MG/DL
LYMPHOCYTES # BLD AUTO: 1.57 K/UL (ref 1–4.8)
LYMPHOCYTES NFR BLD: 30.3 % (ref 22–41)
MCH RBC QN AUTO: 29.9 PG (ref 27–33)
MCHC RBC AUTO-ENTMCNC: 32.2 G/DL (ref 33.6–35)
MCV RBC AUTO: 93.1 FL (ref 81.4–97.8)
MONOCYTES # BLD AUTO: 0.51 K/UL (ref 0–0.85)
MONOCYTES NFR BLD AUTO: 9.8 % (ref 0–13.4)
NEUTROPHILS # BLD AUTO: 2.98 K/UL (ref 2–7.15)
NEUTROPHILS NFR BLD: 57.6 % (ref 44–72)
NRBC # BLD AUTO: 0 K/UL
NRBC BLD-RTO: 0 /100 WBC
PLATELET # BLD AUTO: 254 K/UL (ref 164–446)
PMV BLD AUTO: 11 FL (ref 9–12.9)
POTASSIUM SERPL-SCNC: 4 MMOL/L (ref 3.6–5.5)
PROT SERPL-MCNC: 6.6 G/DL (ref 6–8.2)
RBC # BLD AUTO: 4.61 M/UL (ref 4.2–5.4)
SODIUM SERPL-SCNC: 139 MMOL/L (ref 135–145)
TRIGL SERPL-MCNC: 94 MG/DL (ref 0–149)
WBC # BLD AUTO: 5.2 K/UL (ref 4.8–10.8)

## 2018-10-10 PROCEDURE — 85025 COMPLETE CBC W/AUTO DIFF WBC: CPT

## 2018-10-10 PROCEDURE — 36415 COLL VENOUS BLD VENIPUNCTURE: CPT

## 2018-10-10 PROCEDURE — 83036 HEMOGLOBIN GLYCOSYLATED A1C: CPT | Mod: GA

## 2018-10-10 PROCEDURE — 80053 COMPREHEN METABOLIC PANEL: CPT

## 2018-10-10 PROCEDURE — 80061 LIPID PANEL: CPT

## 2019-02-25 ENCOUNTER — HOSPITAL ENCOUNTER (OUTPATIENT)
Dept: LAB | Facility: MEDICAL CENTER | Age: 69
End: 2019-02-25
Attending: NURSE PRACTITIONER
Payer: MEDICARE

## 2019-02-25 ENCOUNTER — OFFICE VISIT (OUTPATIENT)
Dept: MEDICAL GROUP | Facility: PHYSICIAN GROUP | Age: 69
End: 2019-02-25
Payer: MEDICARE

## 2019-02-25 VITALS
BODY MASS INDEX: 33.03 KG/M2 | OXYGEN SATURATION: 96 % | SYSTOLIC BLOOD PRESSURE: 122 MMHG | WEIGHT: 223 LBS | RESPIRATION RATE: 16 BRPM | TEMPERATURE: 97.5 F | HEART RATE: 68 BPM | HEIGHT: 69 IN | DIASTOLIC BLOOD PRESSURE: 70 MMHG

## 2019-02-25 DIAGNOSIS — M81.0 AGE-RELATED OSTEOPOROSIS WITHOUT CURRENT PATHOLOGICAL FRACTURE: ICD-10-CM

## 2019-02-25 DIAGNOSIS — E66.9 OBESITY (BMI 30-39.9): ICD-10-CM

## 2019-02-25 DIAGNOSIS — Z12.39 SCREENING FOR BREAST CANCER: ICD-10-CM

## 2019-02-25 DIAGNOSIS — N39.41 URGE INCONTINENCE OF URINE: ICD-10-CM

## 2019-02-25 DIAGNOSIS — J45.20 MILD INTERMITTENT ASTHMA WITHOUT COMPLICATION: ICD-10-CM

## 2019-02-25 DIAGNOSIS — Z00.00 PREVENTATIVE HEALTH CARE: ICD-10-CM

## 2019-02-25 DIAGNOSIS — E55.9 VITAMIN D DEFICIENCY: ICD-10-CM

## 2019-02-25 DIAGNOSIS — R74.8 ELEVATED LIVER ENZYMES: ICD-10-CM

## 2019-02-25 DIAGNOSIS — G47.33 OSA (OBSTRUCTIVE SLEEP APNEA): ICD-10-CM

## 2019-02-25 PROBLEM — S80.12XA TRAUMATIC HEMATOMA OF LEFT LOWER LEG: Status: RESOLVED | Noted: 2017-06-29 | Resolved: 2019-02-25

## 2019-02-25 PROBLEM — R05.9 COUGH: Status: RESOLVED | Noted: 2018-10-09 | Resolved: 2019-02-25

## 2019-02-25 PROBLEM — R03.0 ELEVATED BLOOD PRESSURE, SITUATIONAL: Status: RESOLVED | Noted: 2017-05-17 | Resolved: 2019-02-25

## 2019-02-25 LAB
25(OH)D3 SERPL-MCNC: 40 NG/ML (ref 30–100)
ALBUMIN SERPL BCP-MCNC: 4.3 G/DL (ref 3.2–4.9)
ALBUMIN/GLOB SERPL: 1.9 G/DL
ALP SERPL-CCNC: 64 U/L (ref 30–99)
ALT SERPL-CCNC: 21 U/L (ref 2–50)
ANION GAP SERPL CALC-SCNC: 6 MMOL/L (ref 0–11.9)
AST SERPL-CCNC: 22 U/L (ref 12–45)
BASOPHILS # BLD AUTO: 0.8 % (ref 0–1.8)
BASOPHILS # BLD: 0.05 K/UL (ref 0–0.12)
BILIRUB SERPL-MCNC: 0.6 MG/DL (ref 0.1–1.5)
BUN SERPL-MCNC: 16 MG/DL (ref 8–22)
CALCIUM SERPL-MCNC: 10.3 MG/DL (ref 8.5–10.5)
CHLORIDE SERPL-SCNC: 106 MMOL/L (ref 96–112)
CHOLEST SERPL-MCNC: 146 MG/DL (ref 100–199)
CO2 SERPL-SCNC: 29 MMOL/L (ref 20–33)
CREAT SERPL-MCNC: 0.66 MG/DL (ref 0.5–1.4)
EOSINOPHIL # BLD AUTO: 0.13 K/UL (ref 0–0.51)
EOSINOPHIL NFR BLD: 2.1 % (ref 0–6.9)
ERYTHROCYTE [DISTWIDTH] IN BLOOD BY AUTOMATED COUNT: 48.5 FL (ref 35.9–50)
GLOBULIN SER CALC-MCNC: 2.3 G/DL (ref 1.9–3.5)
GLUCOSE SERPL-MCNC: 83 MG/DL (ref 65–99)
HCT VFR BLD AUTO: 43.3 % (ref 37–47)
HDLC SERPL-MCNC: 74 MG/DL
HGB BLD-MCNC: 13.6 G/DL (ref 12–16)
IMM GRANULOCYTES # BLD AUTO: 0.01 K/UL (ref 0–0.11)
IMM GRANULOCYTES NFR BLD AUTO: 0.2 % (ref 0–0.9)
LDLC SERPL CALC-MCNC: 55 MG/DL
LYMPHOCYTES # BLD AUTO: 2.5 K/UL (ref 1–4.8)
LYMPHOCYTES NFR BLD: 41.1 % (ref 22–41)
MCH RBC QN AUTO: 30.4 PG (ref 27–33)
MCHC RBC AUTO-ENTMCNC: 31.4 G/DL (ref 33.6–35)
MCV RBC AUTO: 96.7 FL (ref 81.4–97.8)
MONOCYTES # BLD AUTO: 0.56 K/UL (ref 0–0.85)
MONOCYTES NFR BLD AUTO: 9.2 % (ref 0–13.4)
NEUTROPHILS # BLD AUTO: 2.83 K/UL (ref 2–7.15)
NEUTROPHILS NFR BLD: 46.6 % (ref 44–72)
NRBC # BLD AUTO: 0 K/UL
NRBC BLD-RTO: 0 /100 WBC
PLATELET # BLD AUTO: 278 K/UL (ref 164–446)
PMV BLD AUTO: 11.2 FL (ref 9–12.9)
POTASSIUM SERPL-SCNC: 4.2 MMOL/L (ref 3.6–5.5)
PROT SERPL-MCNC: 6.6 G/DL (ref 6–8.2)
RBC # BLD AUTO: 4.48 M/UL (ref 4.2–5.4)
SODIUM SERPL-SCNC: 141 MMOL/L (ref 135–145)
TRIGL SERPL-MCNC: 87 MG/DL (ref 0–149)
TSH SERPL DL<=0.005 MIU/L-ACNC: 1.34 UIU/ML (ref 0.38–5.33)
WBC # BLD AUTO: 6.1 K/UL (ref 4.8–10.8)

## 2019-02-25 PROCEDURE — 85025 COMPLETE CBC W/AUTO DIFF WBC: CPT

## 2019-02-25 PROCEDURE — 82306 VITAMIN D 25 HYDROXY: CPT

## 2019-02-25 PROCEDURE — 84443 ASSAY THYROID STIM HORMONE: CPT | Mod: GA

## 2019-02-25 PROCEDURE — 80061 LIPID PANEL: CPT

## 2019-02-25 PROCEDURE — 36415 COLL VENOUS BLD VENIPUNCTURE: CPT

## 2019-02-25 PROCEDURE — 99214 OFFICE O/P EST MOD 30 MIN: CPT | Performed by: NURSE PRACTITIONER

## 2019-02-25 PROCEDURE — 80053 COMPREHEN METABOLIC PANEL: CPT

## 2019-02-25 RX ORDER — ALENDRONATE SODIUM 35 MG/1
35 TABLET ORAL
Qty: 4 TAB | Refills: 3 | Status: SHIPPED | OUTPATIENT
Start: 2019-02-25 | End: 2020-11-05

## 2019-02-25 ASSESSMENT — PATIENT HEALTH QUESTIONNAIRE - PHQ9: CLINICAL INTERPRETATION OF PHQ2 SCORE: 0

## 2019-02-25 NOTE — ASSESSMENT & PLAN NOTE
Cold air and smoke seem to trigger, using dulera daily.  Albuterol prn.  Last used 2-3 weeks ago.

## 2019-02-25 NOTE — ASSESSMENT & PLAN NOTE
Taking calcium and vitamin D.  Some work at the gym.  No recent falls. Discussed  Fosamax and she is interested. Will try.  Discussed benefits/risks/side effects

## 2019-02-27 ENCOUNTER — APPOINTMENT (OUTPATIENT)
Dept: MEDICAL GROUP | Facility: PHYSICIAN GROUP | Age: 69
End: 2019-02-27
Payer: MEDICARE

## 2019-03-28 ENCOUNTER — PATIENT MESSAGE (OUTPATIENT)
Dept: MEDICAL GROUP | Facility: PHYSICIAN GROUP | Age: 69
End: 2019-03-28

## 2019-04-11 ENCOUNTER — HOSPITAL ENCOUNTER (OUTPATIENT)
Dept: RADIOLOGY | Facility: MEDICAL CENTER | Age: 69
End: 2019-04-11
Attending: NURSE PRACTITIONER
Payer: MEDICARE

## 2019-04-11 DIAGNOSIS — Z12.39 SCREENING FOR BREAST CANCER: ICD-10-CM

## 2019-04-11 PROCEDURE — 77067 SCR MAMMO BI INCL CAD: CPT

## 2019-05-06 RX ORDER — OXYBUTYNIN CHLORIDE 5 MG/1
TABLET ORAL
Qty: 30 TAB | Refills: 0 | Status: SHIPPED | OUTPATIENT
Start: 2019-05-06 | End: 2019-06-03 | Stop reason: SDUPTHER

## 2019-05-06 NOTE — TELEPHONE ENCOUNTER
Was the patient seen in the last year in this department? Yes    Does patient have an active prescription for medications requested? No   This medication was D/c'd on 10/9/2018      Received Request Via: Pharmacy

## 2019-05-06 NOTE — TELEPHONE ENCOUNTER
Requested Prescriptions     Pending Prescriptions Disp Refills   • oxybutynin (DITROPAN) 5 MG Tab [Pharmacy Med Name: Oxybutynin Chloride Oral Tablet 5 MG] 30 Tab 0     Sig: TAKE 1 TAB BY MOUTH 3 TIMES A DAY.   Ivette Russo M.D.

## 2019-06-03 NOTE — TELEPHONE ENCOUNTER
Was the patient seen in the last year in this department? Yes    LOV 2/25/19    Does patient have an active prescription for medications requested? No     Received Request Via: Pharmacy

## 2019-06-04 RX ORDER — OXYBUTYNIN CHLORIDE 5 MG/1
TABLET ORAL
Qty: 30 TAB | Refills: 3 | Status: SHIPPED | OUTPATIENT
Start: 2019-06-04 | End: 2019-07-31 | Stop reason: SDUPTHER

## 2019-06-04 NOTE — TELEPHONE ENCOUNTER
Requested Prescriptions     Signed Prescriptions Disp Refills   • oxybutynin (DITROPAN) 5 MG Tab 30 Tab 3     Sig: TAKE ONE TABLET BY MOUTH THREE TIMES DAILY     Authorizing Provider: TARAN GILLILAND A.P.R.N.

## 2019-07-31 ENCOUNTER — OFFICE VISIT (OUTPATIENT)
Dept: MEDICAL GROUP | Facility: PHYSICIAN GROUP | Age: 69
End: 2019-07-31
Payer: MEDICARE

## 2019-07-31 VITALS
OXYGEN SATURATION: 96 % | HEART RATE: 91 BPM | DIASTOLIC BLOOD PRESSURE: 70 MMHG | BODY MASS INDEX: 34.07 KG/M2 | RESPIRATION RATE: 14 BRPM | SYSTOLIC BLOOD PRESSURE: 144 MMHG | WEIGHT: 230 LBS | TEMPERATURE: 98.8 F | HEIGHT: 69 IN

## 2019-07-31 DIAGNOSIS — J45.20 MILD INTERMITTENT ASTHMA WITHOUT COMPLICATION: ICD-10-CM

## 2019-07-31 DIAGNOSIS — E66.9 OBESITY (BMI 30-39.9): ICD-10-CM

## 2019-07-31 DIAGNOSIS — N39.41 URGE INCONTINENCE OF URINE: ICD-10-CM

## 2019-07-31 DIAGNOSIS — R41.3 MEMORY LOSS: ICD-10-CM

## 2019-07-31 DIAGNOSIS — M81.0 AGE-RELATED OSTEOPOROSIS WITHOUT CURRENT PATHOLOGICAL FRACTURE: ICD-10-CM

## 2019-07-31 PROCEDURE — 99214 OFFICE O/P EST MOD 30 MIN: CPT | Performed by: NURSE PRACTITIONER

## 2019-07-31 RX ORDER — OXYBUTYNIN CHLORIDE 5 MG/1
5 TABLET ORAL
Qty: 90 TAB | Refills: 3 | Status: SHIPPED | OUTPATIENT
Start: 2019-07-31 | End: 2020-07-14 | Stop reason: SDUPTHER

## 2019-07-31 NOTE — ASSESSMENT & PLAN NOTE
Patient reports that her memory is decreasing.  Worried about. Tearful and worried.  She recently had a large birthday party and felt that she was distracted by all the details.  MOCA exam today at 26/30

## 2019-07-31 NOTE — PROGRESS NOTES
"Chief Complaint   Patient presents with   • Memory Loss       Subjective:   Betty Natarajan is a 69 y.o. female here today for evaluation and management of a new issue:  Memory loss    Memory loss  Patient reports that her memory is decreasing.  Worried about. Tearful and worried.  She recently had a large birthday party and felt that she was distracted by all the details.  MOCA exam today at 26/30    Age-related osteoporosis without current pathological fracture  Not taking fosamax due to side effects.  Calcium/vitamin d, walking/gym    Obesity (BMI 30-39.9)    BMI 33.97    Urge incontinence of urine  Taking ditropan prn daily.     Mild intermittent asthma without complication  dulera and albuterol with good control         Current medicines (including changes today)  Current Outpatient Medications   Medication Sig Dispense Refill   • oxybutynin (DITROPAN) 5 MG Tab Take 1 Tab by mouth 1 time daily as needed. 90 Tab 3   • albuterol 108 (90 Base) MCG/ACT Aero Soln inhalation aerosol Inhale 2 Puffs by mouth every 6 hours as needed for Shortness of Breath.     • Melatonin 5 MG Cap Take  by mouth at bedtime as needed.     • Probiotic Product (PROBIOTIC ACIDOPHILUS) Cap Take  by mouth every day.     • Mometasone Furo-Formoterol Fum (DULERA) 200-5 MCG/ACT Aerosol Inhale  by mouth.     • vitamin D (CHOLECALCIFEROL) 1000 UNIT TABS Take 1,000 Units by mouth every day.     • alendronate (FOSAMAX) 35 MG tablet Take 1 Tab by mouth every 7 days. (Patient not taking: Reported on 7/31/2019) 4 Tab 3     No current facility-administered medications for this visit.      She  has a past medical history of Asthma, Bronchitis, Depression, Influenza, Pap smear, Positive PPD, and Tonsillitis.    ROS as stated in hpi  No chest pain, no shortness of breath, no abdominal pain       Objective:     /70 (BP Location: Left arm, Patient Position: Sitting)   Pulse 91   Temp 37.1 °C (98.8 °F) (Temporal)   Resp 14   Ht 1.753 m (5' 9\")   " Wt 104.3 kg (230 lb)   SpO2 96%  Body mass index is 33.97 kg/m².   Physical Exam:  Constitutional: Alert, no distress.  Skin: Warm, dry, good turgor,no cyanosis, no rashes in visible areas.  Eye: Equal, round and reactive, conjunctiva clear, lids normal.  Ears: No tenderness, no discharge.  External canals are without any significant edema or erythema.    Gross auditory acuity is intact.  Nose: symmetrical without tenderness, no discharge.  Mouth/Throat: lips without lesion.  Oropharynx clear.   Neck: Trachea midline, no masses, no obvious thyroid enlargement.. Range of motion within normal limits.  Neuro: Cranial nerves 2-12 grossly intact.  No sensory deficit.  Respiratory: Unlabored respiratory effort, lungs clear to auscultation, no wheezes, no ronchi.  Cardiovascular: Normal S1, S2, no murmur, no edema.  Abdomen: Soft, non-tender, no masses, no guarding,  no hepatosplenomegaly.  Psych: Alert and oriented x3, normal affect and mood and judgement.        Assessment and Plan:   The following treatment plan was discussed    1. Memory loss  This is a new problem to me.  Acute issue.  MOCA testing in the normal range 26/30.  Reassured patient.  Discussed tricks for short term memory reminders.  Monitor and follow.     2. Age-related osteoporosis without current pathological fracture  Chronic, ongoing. Recently stopped fosomax due to side effects.  Continue with calcium, vitamin D and regular exercise. DEXA up to date.  Monitor and follow.     3. Obesity (BMI 30-39.9)  Chronic, ongoing. BMI 33.97 today.  Encouraged weight loss with diet and exercise.      4. Urge incontinence of urine  Chronic, ongoing, stable with ditropan 1 daily prn.  No side effects reported.  Refills provided.     5. Mild intermittent asthma without complication  Chronic, ongoing. Stable with dulera and albuterol.  Monitor and follow.       Followup: Return in about 6 months (around 1/31/2020).         Educated in proper administration of  medication(s) ordered today including safety, possible SE, risks, benefits, rationale and alternatives to therapy.     Please note that this dictation was created using voice recognition software. I have made every reasonable attempt to correct obvious errors, but I expect that there are errors of grammar and possibly content that I did not discover before finalizing the note.

## 2019-08-01 ENCOUNTER — PATIENT MESSAGE (OUTPATIENT)
Dept: HEALTH INFORMATION MANAGEMENT | Facility: OTHER | Age: 69
End: 2019-08-01

## 2019-09-23 ENCOUNTER — OFFICE VISIT (OUTPATIENT)
Dept: URGENT CARE | Facility: PHYSICIAN GROUP | Age: 69
End: 2019-09-23
Payer: MEDICARE

## 2019-09-23 VITALS
BODY MASS INDEX: 31.84 KG/M2 | DIASTOLIC BLOOD PRESSURE: 90 MMHG | WEIGHT: 215 LBS | SYSTOLIC BLOOD PRESSURE: 132 MMHG | TEMPERATURE: 99.1 F | RESPIRATION RATE: 18 BRPM | HEIGHT: 69 IN | HEART RATE: 72 BPM | OXYGEN SATURATION: 98 %

## 2019-09-23 DIAGNOSIS — K52.9 AGE (ACUTE GASTROENTERITIS): ICD-10-CM

## 2019-09-23 DIAGNOSIS — R19.7 DIARRHEA, UNSPECIFIED TYPE: ICD-10-CM

## 2019-09-23 PROCEDURE — 99213 OFFICE O/P EST LOW 20 MIN: CPT | Performed by: NURSE PRACTITIONER

## 2019-09-23 ASSESSMENT — ENCOUNTER SYMPTOMS
NEUROLOGICAL NEGATIVE: 1
BLOOD IN STOOL: 0
NAUSEA: 1
FEVER: 0
ABDOMINAL PAIN: 0
VOMITING: 1
SHORTNESS OF BREATH: 0
CARDIOVASCULAR NEGATIVE: 1
DIARRHEA: 1
CONSTITUTIONAL NEGATIVE: 1
RESPIRATORY NEGATIVE: 1

## 2019-09-23 NOTE — PATIENT INSTRUCTIONS
Viral Gastroenteritis, Adult  Viral gastroenteritis is also known as the stomach flu. This condition is caused by various viruses. These viruses can be passed from person to person very easily (are very contagious). This condition may affect your stomach, small intestine, and large intestine. It can cause sudden watery diarrhea, fever, and vomiting.  Diarrhea and vomiting can make you feel weak and cause you to become dehydrated. You may not be able to keep fluids down. Dehydration can make you tired and thirsty, cause you to have a dry mouth, and decrease how often you urinate. Older adults and people with other diseases or a weak immune system are at higher risk for dehydration.  It is important to replace the fluids that you lose from diarrhea and vomiting. If you become severely dehydrated, you may need to get fluids through an IV tube.  What are the causes?  Gastroenteritis is caused by various viruses, including rotavirus and norovirus. Norovirus is the most common cause in adults.  You can get sick by eating food, drinking water, or touching a surface contaminated with one of these viruses. You can also get sick from sharing utensils or other personal items with an infected person.  What increases the risk?  This condition is more likely to develop in people:  · Who have a weak defense system (immune system).  · Who live with one or more children who are younger than 2 years old.  · Who live in a nursing home.  · Who go on cruise ships.  What are the signs or symptoms?  Symptoms of this condition start suddenly 1-2 days after exposure to a virus. Symptoms may last a few days or as long as a week. The most common symptoms are watery diarrhea and vomiting. Other symptoms include:  · Fever.  · Headache.  · Fatigue.  · Pain in the abdomen.  · Chills.  · Weakness.  · Nausea.  · Muscle aches.  · Loss of appetite.  How is this diagnosed?  This condition is diagnosed with a medical history and physical exam. You may  also have a stool test to check for viruses or other infections.  How is this treated?  This condition typically goes away on its own. The focus of treatment is to restore lost fluids (rehydration). Your health care provider may recommend that you take an oral rehydration solution (ORS) to replace important salts and minerals (electrolytes) in your body. Severe cases of this condition may require giving fluids through an IV tube.  Treatment may also include medicine to help with your symptoms.  Follow these instructions at home:  Follow instructions from your health care provider about how to care for yourself at home.  Eating and drinking  Follow these recommendations as told by your health care provider:  · Take an ORS. This is a drink that is sold at pharmacies and retail stores.  · Drink clear fluids in small amounts as you are able. Clear fluids include water, ice chips, diluted fruit juice, and low-calorie sports drinks.  · Eat bland, easy-to-digest foods in small amounts as you are able. These foods include bananas, applesauce, rice, lean meats, toast, and crackers.  · Avoid fluids that contain a lot of sugar or caffeine, such as energy drinks, sports drinks, and soda.  · Avoid alcohol.  · Avoid spicy or fatty foods.  General instructions  · Drink enough fluid to keep your urine clear or pale yellow.  · Wash your hands often. If soap and water are not available, use hand .  · Make sure that all people in your household wash their hands well and often.  · Take over-the-counter and prescription medicines only as told by your health care provider.  · Rest at home while you recover.  · Watch your condition for any changes.  · Take a warm bath to relieve any burning or pain from frequent diarrhea episodes.  · Keep all follow-up visits as told by your health care provider. This is important.  Contact a health care provider if:  · You cannot keep fluids down.  · Your symptoms get worse.  · You have new  57 years old male from home with extensive PMH presents c/o syncope last night. 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   Patient states that he was in train last night and all of suddenly felt dizzy associated with headache and sweating and synopsized with LOC for less than a minute, regained consciousness quickly.  1.[facility] outpatient  2.AICD interrogation-no events  3. urine tox+, may be cause of syncope.  4.Continue cardiac medication.  5.PAF-Eliquis.  6.PPI.  7.[facility]ilt as outpatient. symptoms.  · You feel light-headed or dizzy.  · You have muscle cramps.  Get help right away if:  · You have chest pain.  · You feel extremely weak or you faint.  · You see blood in your vomit.  · Your vomit looks like coffee grounds.  · You have bloody or black stools or stools that look like tar.  · You have a severe headache, a stiff neck, or both.  · You have a rash.  · You have severe pain, cramping, or bloating in your abdomen.  · You have trouble breathing or you are breathing very quickly.  · Your heart is beating very quickly.  · Your skin feels cold and clammy.  · You feel confused.  · You have pain when you urinate.  · You have signs of dehydration, such as:  ¨ Dark urine, very little urine, or no urine.  ¨ Cracked lips.  ¨ Dry mouth.  ¨ Sunken eyes.  ¨ Sleepiness.  ¨ Weakness.  This information is not intended to replace advice given to you by your health care provider. Make sure you discuss any questions you have with your health care provider.  Document Released: 12/18/2006 Document Revised: 05/31/2017 Document Reviewed: 08/23/2016  [employer] Interactive Patient Education © 2017 [employer]

## 2019-09-23 NOTE — PROGRESS NOTES
Subjective:     Betty Natarajan is a 69 y.o. female who presents for Diarrhea (x5 days)       Diarrhea    This is a new problem. The problem has been gradually worsening. Associated symptoms include vomiting. Pertinent negatives include no abdominal pain or fever. There is no history of irritable bowel syndrome.     Patient reports that 5 days ago she was traveling out of state.  At that time she started to experience diarrhea.  Also had a few episodes of vomiting and bloating.  Currently reporting headache.  Denies sick contacts, recent foreign travel, blood in emesis or stool, or other symptoms.    PMH:  has a past medical history of Asthma, Bronchitis, Depression, Influenza, Pap smear, Positive PPD, and Tonsillitis.    MEDS:   Current Outpatient Medications:   •  oxybutynin (DITROPAN) 5 MG Tab, Take 1 Tab by mouth 1 time daily as needed., Disp: 90 Tab, Rfl: 3  •  albuterol 108 (90 Base) MCG/ACT Aero Soln inhalation aerosol, Inhale 2 Puffs by mouth every 6 hours as needed for Shortness of Breath., Disp: , Rfl:   •  Melatonin 5 MG Cap, Take  by mouth at bedtime as needed., Disp: , Rfl:   •  Probiotic Product (PROBIOTIC ACIDOPHILUS) Cap, Take  by mouth every day., Disp: , Rfl:   •  Mometasone Furo-Formoterol Fum (DULERA) 200-5 MCG/ACT Aerosol, Inhale  by mouth., Disp: , Rfl:   •  vitamin D (CHOLECALCIFEROL) 1000 UNIT TABS, Take 1,000 Units by mouth every day., Disp: , Rfl:   •  alendronate (FOSAMAX) 35 MG tablet, Take 1 Tab by mouth every 7 days. (Patient not taking: Reported on 7/31/2019), Disp: 4 Tab, Rfl: 3    ALLERGIES:   Allergies   Allergen Reactions   • Pcn [Penicillins]      Hives itching      SURGHX:   Past Surgical History:   Procedure Laterality Date   • COLONOSCOPY  2/17/2016    internal hemorrhoids   • HEMORRHOIDECTOMY  5/08   • COLONOSCOPY  2005    normal   • GASTRIC BYPASS LAPAROSCOPIC  2001   • NODULE EXCISION      Performed by EDELMIRA MONTE at SURGERY [facility] ORS   • PB REMV 2ND  "CATARACT,CORN-SCLER SECTN     • TONSILLECTOMY     • US-NEEDLE CORE BX-BREAST PANEL       SOCHX:  reports that she has never smoked. She has never used smokeless tobacco. She reports that she drinks alcohol. She reports that she does not use drugs.     FH: Reviewed with patient, not pertinent to this visit.    Review of Systems   Constitutional: Negative.  Negative for fever and malaise/fatigue.   Respiratory: Negative.  Negative for shortness of breath.    Cardiovascular: Negative.    Gastrointestinal: Positive for diarrhea, nausea and vomiting. Negative for abdominal pain and blood in stool.   Genitourinary: Negative.  Negative for dysuria.   Neurological: Negative.    All other systems reviewed and are negative.    Objective:     /90   Pulse 72   Temp 37.3 °C (99.1 °F)   Resp 18   Ht 1.753 m (5' 9\")   Wt 97.5 kg (215 lb)   LMP 05/17/2007   SpO2 98%   BMI 31.75 kg/m²     Physical Exam   Constitutional: She is oriented to person, place, and time. She appears well-developed and well-nourished. She is cooperative.  Non-toxic appearance. No distress.   HENT:   Head: Normocephalic.   Right Ear: External ear normal.   Left Ear: External ear normal.   Nose: Nose normal.   Eyes: Conjunctivae and EOM are normal.   Neck: Normal range of motion.   Cardiovascular: Normal rate and intact distal pulses.   Pulmonary/Chest: Effort normal. No respiratory distress.   Abdominal: Soft. Normal appearance. She exhibits no distension. Bowel sounds are increased. There is no tenderness.   Musculoskeletal: Normal range of motion. She exhibits no deformity.   Neurological: She is alert and oriented to person, place, and time. She has normal strength. No sensory deficit. Coordination normal.   Skin: Skin is warm and dry. She is not diaphoretic. No pallor.   Psychiatric: She has a normal mood and affect. Her behavior is normal.   Vitals reviewed.       Assessment/Plan:     1. AGE (acute gastroenteritis)    2. Diarrhea, " unspecified type    Discussed likely viral etiology and expected course and duration of illness.    Discussed close monitoring and supportive measures including increasing fluids to avoid dehydration and rest as well as OTC symptom management.  May continue with Imodium OTC.    VS stable, afebrile, NAD.    Warning signs reviewed. Strict return/ER precautions advised.    Patient advised to: Return for 1) Symptoms don't improve or worsen, or go to ER, 2) Follow up with primary care in 7-10 days.    Differential diagnosis, natural history, supportive care, and indications for immediate follow-up discussed. All questions answered. Patient agrees with the plan of care.

## 2020-01-14 ENCOUNTER — APPOINTMENT (RX ONLY)
Dept: URBAN - METROPOLITAN AREA CLINIC 22 | Facility: CLINIC | Age: 70
Setting detail: DERMATOLOGY
End: 2020-01-14

## 2020-01-14 DIAGNOSIS — L81.4 OTHER MELANIN HYPERPIGMENTATION: ICD-10-CM

## 2020-01-14 DIAGNOSIS — D18.0 HEMANGIOMA: ICD-10-CM

## 2020-01-14 DIAGNOSIS — D22 MELANOCYTIC NEVI: ICD-10-CM

## 2020-01-14 DIAGNOSIS — L82.1 OTHER SEBORRHEIC KERATOSIS: ICD-10-CM

## 2020-01-14 DIAGNOSIS — Z71.89 OTHER SPECIFIED COUNSELING: ICD-10-CM

## 2020-01-14 DIAGNOSIS — L57.0 ACTINIC KERATOSIS: ICD-10-CM

## 2020-01-14 PROBLEM — D18.01 HEMANGIOMA OF SKIN AND SUBCUTANEOUS TISSUE: Status: ACTIVE | Noted: 2020-01-14

## 2020-01-14 PROBLEM — D22.5 MELANOCYTIC NEVI OF TRUNK: Status: ACTIVE | Noted: 2020-01-14

## 2020-01-14 PROCEDURE — ? ADDITIONAL NOTES

## 2020-01-14 PROCEDURE — ? SUNSCREEN RECOMMENDATIONS

## 2020-01-14 PROCEDURE — 99203 OFFICE O/P NEW LOW 30 MIN: CPT | Mod: 25

## 2020-01-14 PROCEDURE — ? LIQUID NITROGEN

## 2020-01-14 PROCEDURE — ? COUNSELING

## 2020-01-14 PROCEDURE — 17000 DESTRUCT PREMALG LESION: CPT

## 2020-01-14 ASSESSMENT — LOCATION DETAILED DESCRIPTION DERM
LOCATION DETAILED: RIGHT MEDIAL BREAST 4-5:00 REGION
LOCATION DETAILED: EPIGASTRIC SKIN
LOCATION DETAILED: LEFT PROXIMAL POSTERIOR UPPER ARM
LOCATION DETAILED: LEFT ULNAR DORSAL HAND
LOCATION DETAILED: RIGHT RADIAL DORSAL HAND
LOCATION DETAILED: PERIUMBILICAL SKIN
LOCATION DETAILED: RIGHT MEDIAL SUPERIOR CHEST
LOCATION DETAILED: LEFT INFERIOR MEDIAL MALAR CHEEK

## 2020-01-14 ASSESSMENT — LOCATION ZONE DERM
LOCATION ZONE: ARM
LOCATION ZONE: HAND
LOCATION ZONE: FACE
LOCATION ZONE: TRUNK

## 2020-01-14 ASSESSMENT — LOCATION SIMPLE DESCRIPTION DERM
LOCATION SIMPLE: CHEST
LOCATION SIMPLE: ABDOMEN
LOCATION SIMPLE: LEFT CHEEK
LOCATION SIMPLE: RIGHT BREAST
LOCATION SIMPLE: RIGHT HAND
LOCATION SIMPLE: LEFT HAND
LOCATION SIMPLE: LEFT POSTERIOR UPPER ARM

## 2020-03-11 ENCOUNTER — TELEPHONE (OUTPATIENT)
Dept: CASE MANAGEMENT | Facility: MEDICAL CENTER | Age: 70
End: 2020-03-11

## 2020-03-11 NOTE — TELEPHONE ENCOUNTER
1. Caller Name: Betty                      Call Back Number: [phone]  [facility] PCP or Specialty Provider: Yes         2.  Does patient have any active symptoms of respiratory illness (fever OR cough OR shortness of breath)? Yes, the patient reports the following respiratory symptoms: cough, intermittent. Congested. Has asthma. Symptoms for approx 2 weeks.    3.  In the last 30 days, has the patient traveled outside of the country OR in a high risk area within the [place] OR have any known contact with someone who has?  No.    4.  Does patient have any comoribidities? None     5. Disposition:  Advised to perform self care, monitor for worsening symptoms and to call back in 3 days if no improvement.    Note routed to PCP: FYI only.

## 2020-03-12 ENCOUNTER — PATIENT MESSAGE (OUTPATIENT)
Dept: MEDICAL GROUP | Facility: PHYSICIAN GROUP | Age: 70
End: 2020-03-12

## 2020-03-16 ENCOUNTER — OFFICE VISIT (OUTPATIENT)
Dept: URGENT CARE | Facility: CLINIC | Age: 70
End: 2020-03-16
Payer: MEDICARE

## 2020-03-16 VITALS
DIASTOLIC BLOOD PRESSURE: 80 MMHG | SYSTOLIC BLOOD PRESSURE: 134 MMHG | WEIGHT: 220 LBS | HEART RATE: 94 BPM | OXYGEN SATURATION: 93 % | BODY MASS INDEX: 32.58 KG/M2 | RESPIRATION RATE: 20 BRPM | TEMPERATURE: 98.5 F | HEIGHT: 69 IN

## 2020-03-16 DIAGNOSIS — J98.01 BRONCHOSPASM: ICD-10-CM

## 2020-03-16 DIAGNOSIS — J22 ACUTE RESPIRATORY INFECTION: ICD-10-CM

## 2020-03-16 PROCEDURE — 99214 OFFICE O/P EST MOD 30 MIN: CPT | Performed by: FAMILY MEDICINE

## 2020-03-16 RX ORDER — DOXYCYCLINE HYCLATE 100 MG
100 TABLET ORAL 2 TIMES DAILY
Qty: 14 TAB | Refills: 0 | Status: SHIPPED | OUTPATIENT
Start: 2020-03-16 | End: 2020-03-23

## 2020-03-16 ASSESSMENT — ENCOUNTER SYMPTOMS
MYALGIAS: 0
SHORTNESS OF BREATH: 0
CHILLS: 0
NAUSEA: 0
FEVER: 0
COUGH: 1
VOMITING: 0
EYE REDNESS: 0
SORE THROAT: 0
DIZZINESS: 0

## 2020-03-16 ASSESSMENT — FIBROSIS 4 INDEX: FIB4 SCORE: 1.21

## 2020-03-17 NOTE — PATIENT INSTRUCTIONS
Bronchitis  Bronchitis is a problem of the air tubes leading to your lungs. This problem makes it hard for air to get in and out of the lungs. You may cough a lot because your air tubes are narrow. Going without care can cause lasting (chronic) bronchitis.  HOME CARE   · Drink enough fluids to keep your pee (urine) clear or pale yellow.  · Use a cool mist humidifier.  · Quit smoking if you smoke. If you keep smoking, the bronchitis might not get better.  · Only take medicine as told by your doctor.  GET HELP RIGHT AWAY IF:   · Coughing keeps you awake.  · You start to wheeze.  · You become more sick or weak.  · You have a hard time breathing or get short of breath.  · You cough up blood.  · Coughing lasts more than 2 weeks.  · You have a fever.  · Your baby is older than 3 months with a rectal temperature of 102° F (38.9° C) or higher.  · Your baby is 3 months old or younger with a rectal temperature of 100.4° F (38° C) or higher.  MAKE SURE YOU:  · Understand these instructions.  · Will watch your condition.  · Will get help right away if you are not doing well or get worse.  Document Released: 06/05/2009 Document Revised: 03/11/2013 Document Reviewed: 11/19/2010  [employer]® Patient Information ©2014 [employer].

## 2020-03-17 NOTE — PROGRESS NOTES
Subjective:   Betty Natarajan is a 69 y.o. female who presents for Cough (x3wks, cough, chest congestion, irritated throat)        Cough   This is a new problem. The current episode started 1 to 4 weeks ago (3 weeks). The cough is productive of sputum. Pertinent negatives include no chest pain, chills, eye redness, fever, myalgias, rash, sore throat or shortness of breath. She has tried a beta-agonist inhaler (Zycam) for the symptoms. Her past medical history is significant for asthma.     PMH:  has a past medical history of Asthma, Bronchitis, Depression, Influenza, Pap smear, Positive PPD, and Tonsillitis.  MEDS:   Current Outpatient Medications:   •  guaiFENesin (MUCINEX PO), Take  by mouth., Disp: , Rfl:   •  doxycycline (VIBRAMYCIN) 100 MG Tab, Take 1 Tab by mouth 2 times a day for 7 days., Disp: 14 Tab, Rfl: 0  •  albuterol 108 (90 Base) MCG/ACT Aero Soln inhalation aerosol, Inhale 2 Puffs by mouth every 6 hours as needed for Shortness of Breath., Disp: , Rfl:   •  Melatonin 5 MG Cap, Take  by mouth at bedtime as needed., Disp: , Rfl:   •  Probiotic Product (PROBIOTIC ACIDOPHILUS) Cap, Take  by mouth every day., Disp: , Rfl:   •  Mometasone Furo-Formoterol Fum (DULERA) 200-5 MCG/ACT Aerosol, Inhale  by mouth., Disp: , Rfl:   •  vitamin D (CHOLECALCIFEROL) 1000 UNIT TABS, Take 1,000 Units by mouth every day., Disp: , Rfl:   •  oxybutynin (DITROPAN) 5 MG Tab, Take 1 Tab by mouth 1 time daily as needed. (Patient not taking: Reported on 3/16/2020), Disp: 90 Tab, Rfl: 3  •  alendronate (FOSAMAX) 35 MG tablet, Take 1 Tab by mouth every 7 days. (Patient not taking: Reported on 7/31/2019), Disp: 4 Tab, Rfl: 3  ALLERGIES:   Allergies   Allergen Reactions   • Pcn [Penicillins]      Hives itching      SURGHX:   Past Surgical History:   Procedure Laterality Date   • COLONOSCOPY  2/17/2016    internal hemorrhoids   • HEMORRHOIDECTOMY  5/08   • COLONOSCOPY  2005    normal   • GASTRIC BYPASS LAPAROSCOPIC  2001   • NODULE  "EXCISION      Performed by EDELMIRA MONTE at SURGERY [facility] ORS   • PB REMV 2ND CATARACT,CORN-SCLER SECTN     • TONSILLECTOMY     • US-NEEDLE CORE BX-BREAST PANEL       SOCHX:  reports that she has never smoked. She has never used smokeless tobacco. She reports current alcohol use. She reports that she does not use drugs.  FH: Family history was reviewed  Review of Systems   Constitutional: Negative for chills and fever.   HENT: Negative for sore throat.    Eyes: Negative for redness.   Respiratory: Positive for cough. Negative for shortness of breath.    Cardiovascular: Negative for chest pain.   Gastrointestinal: Negative for nausea and vomiting.   Musculoskeletal: Negative for myalgias.   Skin: Negative for rash.   Neurological: Negative for dizziness.        Objective:   /80 (BP Location: Left arm, Patient Position: Sitting, BP Cuff Size: Adult)   Pulse 94   Temp 36.9 °C (98.5 °F) (Temporal)   Resp 20   Ht 1.753 m (5' 9\")   Wt 99.8 kg (220 lb)   LMP 05/17/2007   SpO2 93%   BMI 32.49 kg/m²   Physical Exam  Vitals signs and nursing note reviewed.   Constitutional:       General: She is not in acute distress.     Appearance: She is well-developed.   HENT:      Head: Normocephalic and atraumatic.      Right Ear: Tympanic membrane and external ear normal.      Left Ear: Tympanic membrane and external ear normal.      Nose: Mucosal edema and rhinorrhea present.      Right Turbinates: Swollen.      Left Turbinates: Swollen.      Mouth/Throat:      Mouth: Mucous membranes are moist.      Pharynx: Posterior oropharyngeal erythema present.   Eyes:      Conjunctiva/sclera: Conjunctivae normal.      Pupils: Pupils are equal, round, and reactive to light.   Cardiovascular:      Rate and Rhythm: Normal rate and regular rhythm.      Heart sounds: No murmur.   Pulmonary:      Effort: Pulmonary effort is normal. No respiratory distress.      Breath sounds: Wheezing and rhonchi present.   Abdominal:      " General: There is no distension.      Palpations: Abdomen is soft.      Tenderness: There is no abdominal tenderness.   Musculoskeletal: Normal range of motion.   Skin:     General: Skin is warm and dry.   Neurological:      General: No focal deficit present.      Mental Status: She is alert and oriented to person, place, and time. Mental status is at baseline.      Gait: Gait (gait at baseline) normal.   Psychiatric:         Judgment: Judgment normal.           Assessment/Plan:   1. Acute respiratory infection  - doxycycline (VIBRAMYCIN) 100 MG Tab; Take 1 Tab by mouth 2 times a day for 7 days.  Dispense: 14 Tab; Refill: 0    2. Bronchospasm    Other orders  - guaiFENesin (MUCINEX PO); Take  by mouth.      Advised regular use of albuterol every 6 hours for the next 2 days and then as needed.        Discussed close monitoring, return precautions, and supportive measures including maintaining adequate fluid hydration and caloric intake, relative rest and OTC symptom management including acetaminophen as needed for pain and/or fever.    Differential diagnosis, natural history, supportive care, and indications for immediate follow-up discussed.     Advised the patient to follow-up with the primary care physician for recheck, reevaluation, and consideration of further management.

## 2020-05-04 ENCOUNTER — PATIENT MESSAGE (OUTPATIENT)
Dept: MEDICAL GROUP | Facility: PHYSICIAN GROUP | Age: 70
End: 2020-05-04

## 2020-07-10 ENCOUNTER — TELEPHONE (OUTPATIENT)
Dept: MEDICAL GROUP | Facility: PHYSICIAN GROUP | Age: 70
End: 2020-07-10

## 2020-07-10 NOTE — TELEPHONE ENCOUNTER
ANNUAL WELLNESS VISIT PRE-VISIT PLANNING WITHOUT OUTREACH    1.  Reviewed note from last office visit with PCP: YES, 07/31/2019    2.  If any orders were placed at last visit, do we have Results/Consult Notes?        •  Labs - Labs were not ordered at last office visit.       •  Imaging - Imaging was not ordered at last office visit.       •  Referrals - No referrals were ordered at last office visit.    3.  Immunizations were updated in [facility] using WebIZ?: Yes       •  WebIZ Recommendations: FLU, TD and cpox        •  Is patient due for Tdap? NO       •  Is patient due for Shingrix? NO     4.  Patient is due for the following Health Maintenance Topics:   Health Maintenance Due   Topic Date Due   • Annual Wellness Visit  12/15/2016   • BONE DENSITY  02/01/2019   • MAMMOGRAM  04/11/2020   • PAP SMEAR  07/25/2020     5.  Reviewed/Updated the following with patient:       •   Preferred Pharmacy? YES       •   Preferred Lab? YES       •   Preferred Communication? YES       •   Allergies? YES       •   Medications? YES. Was Abstract Encounter opened and chart updated? YES       •   Social History? YES. Was Abstract Encounter opened and chart updated? YES       •   Family History (document living status of immediate family members and if + hx of  cancer, diabetes, hypertension, hyperlipidemia, heart attack, stroke) YES. Was Abstract Encounter opened and chart updated? YES    6.  Care Team Updated:       •   DME Company (gait device, O2, CPAP, etc.): NO       •   Other Specialists (eye doctor, derm, GYN, cardiology, endo, etc): YES, pt had eye exam last year    7. Orders for overdue Health Maintenance topics pended in Pre-Charting? NO    8.  Patient has the following Care Path diagnoses on Problem List:  NONE    9.  Patient was advised: “This is a free wellness visit. The provider will screen for medical conditions to help you stay healthy. If you have other concerns to address you may be asked to discuss these at a  separate visit or there may be an additional fee.”     10.  Patient was informed to arrive 15 min prior to their scheduled appointment and bring in their medication bottles.

## 2020-07-14 ENCOUNTER — HOSPITAL ENCOUNTER (OUTPATIENT)
Dept: LAB | Facility: MEDICAL CENTER | Age: 70
End: 2020-07-14
Attending: NURSE PRACTITIONER
Payer: MEDICARE

## 2020-07-14 ENCOUNTER — OFFICE VISIT (OUTPATIENT)
Dept: MEDICAL GROUP | Facility: PHYSICIAN GROUP | Age: 70
End: 2020-07-14
Payer: MEDICARE

## 2020-07-14 VITALS
OXYGEN SATURATION: 93 % | RESPIRATION RATE: 12 BRPM | TEMPERATURE: 98.3 F | HEIGHT: 69 IN | DIASTOLIC BLOOD PRESSURE: 82 MMHG | WEIGHT: 218 LBS | SYSTOLIC BLOOD PRESSURE: 124 MMHG | BODY MASS INDEX: 32.29 KG/M2 | HEART RATE: 75 BPM

## 2020-07-14 DIAGNOSIS — E55.9 VITAMIN D DEFICIENCY: ICD-10-CM

## 2020-07-14 DIAGNOSIS — E66.9 OBESITY (BMI 30-39.9): ICD-10-CM

## 2020-07-14 DIAGNOSIS — G47.33 OSA (OBSTRUCTIVE SLEEP APNEA): ICD-10-CM

## 2020-07-14 DIAGNOSIS — M81.0 AGE-RELATED OSTEOPOROSIS WITHOUT CURRENT PATHOLOGICAL FRACTURE: ICD-10-CM

## 2020-07-14 DIAGNOSIS — R53.82 CHRONIC FATIGUE: ICD-10-CM

## 2020-07-14 DIAGNOSIS — N39.41 URGE INCONTINENCE OF URINE: ICD-10-CM

## 2020-07-14 DIAGNOSIS — J45.20 MILD INTERMITTENT ASTHMA WITHOUT COMPLICATION: ICD-10-CM

## 2020-07-14 PROBLEM — Z01.419 WELL WOMAN EXAM WITH ROUTINE GYNECOLOGICAL EXAM: Status: RESOLVED | Noted: 2017-07-25 | Resolved: 2020-07-14

## 2020-07-14 PROBLEM — R79.89 ELEVATED LIVER FUNCTION TESTS: Status: RESOLVED | Noted: 2017-06-29 | Resolved: 2020-07-14

## 2020-07-14 LAB
25(OH)D3 SERPL-MCNC: 32 NG/ML (ref 30–100)
ALBUMIN SERPL BCP-MCNC: 3.9 G/DL (ref 3.2–4.9)
ALBUMIN/GLOB SERPL: 1.6 G/DL
ALP SERPL-CCNC: 68 U/L (ref 30–99)
ALT SERPL-CCNC: 28 U/L (ref 2–50)
ANION GAP SERPL CALC-SCNC: 7 MMOL/L (ref 7–16)
AST SERPL-CCNC: 22 U/L (ref 12–45)
BASOPHILS # BLD AUTO: 1.1 % (ref 0–1.8)
BASOPHILS # BLD: 0.06 K/UL (ref 0–0.12)
BILIRUB SERPL-MCNC: 0.5 MG/DL (ref 0.1–1.5)
BUN SERPL-MCNC: 13 MG/DL (ref 8–22)
CALCIUM SERPL-MCNC: 9.3 MG/DL (ref 8.5–10.5)
CHLORIDE SERPL-SCNC: 105 MMOL/L (ref 96–112)
CHOLEST SERPL-MCNC: 135 MG/DL (ref 100–199)
CO2 SERPL-SCNC: 27 MMOL/L (ref 20–33)
CREAT SERPL-MCNC: 0.53 MG/DL (ref 0.5–1.4)
EOSINOPHIL # BLD AUTO: 0.09 K/UL (ref 0–0.51)
EOSINOPHIL NFR BLD: 1.7 % (ref 0–6.9)
ERYTHROCYTE [DISTWIDTH] IN BLOOD BY AUTOMATED COUNT: 47.5 FL (ref 35.9–50)
GLOBULIN SER CALC-MCNC: 2.5 G/DL (ref 1.9–3.5)
GLUCOSE SERPL-MCNC: 92 MG/DL (ref 65–99)
HCT VFR BLD AUTO: 43.5 % (ref 37–47)
HDLC SERPL-MCNC: 65 MG/DL
HGB BLD-MCNC: 14 G/DL (ref 12–16)
IMM GRANULOCYTES # BLD AUTO: 0.01 K/UL (ref 0–0.11)
IMM GRANULOCYTES NFR BLD AUTO: 0.2 % (ref 0–0.9)
LDLC SERPL CALC-MCNC: 52 MG/DL
LYMPHOCYTES # BLD AUTO: 2.51 K/UL (ref 1–4.8)
LYMPHOCYTES NFR BLD: 46.7 % (ref 22–41)
MCH RBC QN AUTO: 30.6 PG (ref 27–33)
MCHC RBC AUTO-ENTMCNC: 32.2 G/DL (ref 33.6–35)
MCV RBC AUTO: 95 FL (ref 81.4–97.8)
MONOCYTES # BLD AUTO: 0.41 K/UL (ref 0–0.85)
MONOCYTES NFR BLD AUTO: 7.6 % (ref 0–13.4)
NEUTROPHILS # BLD AUTO: 2.3 K/UL (ref 2–7.15)
NEUTROPHILS NFR BLD: 42.7 % (ref 44–72)
NRBC # BLD AUTO: 0 K/UL
NRBC BLD-RTO: 0 /100 WBC
PLATELET # BLD AUTO: 268 K/UL (ref 164–446)
PMV BLD AUTO: 10.6 FL (ref 9–12.9)
POTASSIUM SERPL-SCNC: 4.3 MMOL/L (ref 3.6–5.5)
PROT SERPL-MCNC: 6.4 G/DL (ref 6–8.2)
RBC # BLD AUTO: 4.58 M/UL (ref 4.2–5.4)
SODIUM SERPL-SCNC: 139 MMOL/L (ref 135–145)
TRIGL SERPL-MCNC: 91 MG/DL (ref 0–149)
TSH SERPL DL<=0.005 MIU/L-ACNC: 2 UIU/ML (ref 0.38–5.33)
WBC # BLD AUTO: 5.4 K/UL (ref 4.8–10.8)

## 2020-07-14 PROCEDURE — 80061 LIPID PANEL: CPT

## 2020-07-14 PROCEDURE — 36415 COLL VENOUS BLD VENIPUNCTURE: CPT

## 2020-07-14 PROCEDURE — 85025 COMPLETE CBC W/AUTO DIFF WBC: CPT

## 2020-07-14 PROCEDURE — 84443 ASSAY THYROID STIM HORMONE: CPT

## 2020-07-14 PROCEDURE — 82306 VITAMIN D 25 HYDROXY: CPT

## 2020-07-14 PROCEDURE — G0439 PPPS, SUBSEQ VISIT: HCPCS | Performed by: NURSE PRACTITIONER

## 2020-07-14 PROCEDURE — 80053 COMPREHEN METABOLIC PANEL: CPT

## 2020-07-14 RX ORDER — OXYBUTYNIN CHLORIDE 5 MG/1
5 TABLET ORAL 2 TIMES DAILY PRN
Qty: 180 TAB | Refills: 3 | Status: SHIPPED | OUTPATIENT
Start: 2020-07-14 | End: 2022-02-22 | Stop reason: SDUPTHER

## 2020-07-14 ASSESSMENT — ACTIVITIES OF DAILY LIVING (ADL): BATHING_REQUIRES_ASSISTANCE: 0

## 2020-07-14 ASSESSMENT — ENCOUNTER SYMPTOMS: GENERAL WELL-BEING: EXCELLENT

## 2020-07-14 ASSESSMENT — PATIENT HEALTH QUESTIONNAIRE - PHQ9: CLINICAL INTERPRETATION OF PHQ2 SCORE: 0

## 2020-07-14 ASSESSMENT — FIBROSIS 4 INDEX: FIB4 SCORE: 1.23

## 2020-07-14 NOTE — ASSESSMENT & PLAN NOTE
dulera and albuterol.  02 sat at 93% today. Reports intermittently use of albuterol.  Trigger seems to be dust.  Folowed by Dr. Nguyen.

## 2020-07-14 NOTE — PROGRESS NOTES
Chief Complaint   Patient presents with   • Annual Exam     AWV         HPI:  Betty is a 70 y.o. here for Medicare Annual Wellness Visit    Obesity (BMI 30-39.9)  BMi 32.19 today.     Mild intermittent asthma without complication  dulera and albuterol.  02 sat at 93% today. Reports intermittently use of albuterol.  Trigger seems to be dust.  Folowed by Dr. Nguyen.     Osteoporosis  Was on fosamax.     Age-related osteoporosis without current pathological fracture  Stopped taking fosamax due to side effects.  Taking good vitamin supplement.     Urge incontinence of urine  Taking oxybutinin daily when she is going out with friends.  Reports dry mouth.       Patient Active Problem List    Diagnosis Date Noted   • Memory loss 07/31/2019   • Age-related osteoporosis without current pathological fracture 02/02/2018   • History of kidney stones 07/25/2017   • Urge incontinence of urine 07/25/2017   • Well woman exam with routine gynecological exam 07/25/2017   • Elevated liver function tests 06/29/2017   • STEVEN (obstructive sleep apnea) 05/05/2017   • Obesity (BMI 30-39.9) 12/14/2016   • Osteoporosis 12/15/2015   • Mild intermittent asthma without complication 02/01/2013   • Vitamin D deficiency 08/04/2010       Current Outpatient Medications   Medication Sig Dispense Refill   • guaiFENesin (MUCINEX PO) Take  by mouth.     • oxybutynin (DITROPAN) 5 MG Tab Take 1 Tab by mouth 1 time daily as needed. 90 Tab 3   • alendronate (FOSAMAX) 35 MG tablet Take 1 Tab by mouth every 7 days. (Patient not taking: Reported on 7/31/2019) 4 Tab 3   • albuterol 108 (90 Base) MCG/ACT Aero Soln inhalation aerosol Inhale 2 Puffs by mouth every 6 hours as needed for Shortness of Breath.     • Melatonin 5 MG Cap Take  by mouth at bedtime as needed.     • Probiotic Product (PROBIOTIC ACIDOPHILUS) Cap Take  by mouth every day.     • Mometasone Furo-Formoterol Fum (DULERA) 200-5 MCG/ACT Aerosol Inhale  by mouth.     • vitamin D (CHOLECALCIFEROL)  1000 UNIT TABS Take 1,000 Units by mouth every day.       No current facility-administered medications for this visit.         Patient is taking medications as noted in medication list.  Current supplements as per medication list.     Allergies: Pcn [penicillins]    Current social contact/activities: visiting with friends and family     Is patient current with immunizations? Yes.    She  reports that she has never smoked. She has never used smokeless tobacco. She reports current alcohol use. She reports that she does not use drugs.  Counseling given: Not Answered        DPA/Advanced directive: Patient does not have an Advanced Directive.  A packet and workshop information was given on Advanced Directives.    ROS:    Gait: Uses no assistive device   Ostomy: No  Other tubes: No   Amputations: No   Chronic oxygen use No   Last eye exam was last year  Wears hearing aids: No   : Denies any urinary leakage during the last 6 months  Uses oxybutinin daily    Screening:    Due for mammo and dexa.  Orders placed.     Depression Screening    Little interest or pleasure in doing things?  0 - not at all  Feeling down, depressed, or hopeless? 0 - not at all  Patient Health Questionnaire Score: 0    If depressive symptoms identified deferred to follow up visit unless specifically addressed in assessment and plan.    Interpretation of PHQ-9 Total Score   Score Severity   1-4 No Depression   5-9 Mild Depression   10-14 Moderate Depression   15-19 Moderately Severe Depression   20-27 Severe Depression    Screening for Cognitive Impairment    Three Minute Recall (river, nation, finger)  2/3 River sarahi finger  2/3  Jeison clock face with all 12 numbers and set the hands to show 10 past 11.  Yes Clock set to 11:10  5 /5  If cognitive concerns identified, deferred for follow up unless specifically addressed in assessment and plan.    Fall Risk Assessment    Has the patient had two or more falls in the last year or any fall with injury  in the last year?  No  If fall risk identified, deferred for follow up unless specifically addressed in assessment and plan.    Safety Assessment    Throw rugs on floor.  No  Handrails on all stairs.  Yes  Good lighting in all hallways.  Yes  Difficulty hearing.  No  Patient counseled about all safety risks that were identified.    Functional Assessment ADLs    Are there any barriers preventing you from cooking for yourself or meeting nutritional needs?  No.    Are there any barriers preventing you from driving safely or obtaining transportation?  No.    Are there any barriers preventing you from using a telephone or calling for help?  No.    Are there any barriers preventing you from shopping?  No.    Are there any barriers preventing you from taking care of your own finances?  No.    Are there any barriers preventing you from managing your medications?  No.    Are there any barriers preventing you from showering, bathing or dressing yourself?  No.    Are you currently engaging in any exercise or physical activity?  Yes.  Occasionally rides stationary bike 2 to 3 times a week  What is your perception of your health?  Excellent.    Health Maintenance Summary                Annual Wellness Visit Overdue 12/15/2016      Done 12/15/2015 Visit Dx: Medicare annual wellness visit, initial     Patient has more history with this topic...    BONE DENSITY Overdue 2/1/2019      Done 2/1/2018 DS-BONE DENSITY STUDY (DEXA)     Patient has more history with this topic...    MAMMOGRAM Overdue 4/11/2020      Done 4/11/2019 MA-SCREEN MAMMO W/CAD-BILAT     Patient has more history with this topic...    PAP SMEAR Next Due 7/25/2020      Done 7/25/2017 PATHOLOGY GYN SPECIMEN     Patient has more history with this topic...    IMM INFLUENZA Next Due 9/1/2020      Done 11/3/2019 Imm Admin: Influenza Vaccine Adult HD     Patient has more history with this topic...    COLONOSCOPY Next Due 2/17/2021      Done 2/17/2016 AMB REFERRAL TO GI FOR  COLONOSCOPY    IMM DTaP/Tdap/Td Vaccine Next Due 8/17/2021      Done 8/17/2011 Imm Admin: Tdap Vaccine     Patient has more history with this topic...          Patient Care Team:  BRANDO Garrido as PCP - General (Family Medicine)  Peter Nguyen M.D. as Consulting Physician (Pediatric Pulmonology)    Social History     Tobacco Use   • Smoking status: Never Smoker   • Smokeless tobacco: Never Used   Substance Use Topics   • Alcohol use: Yes     Alcohol/week: 0.0 oz     Comment: occasional   • Drug use: No     Family History   Problem Relation Age of Onset   • Diabetes Father    • Hypertension Father    • Genitourinary () Problems Father         renal failure on dialysis   • Cancer Brother         brain tumor   • Breast Cancer Daughter    • Heart Attack Brother    • No Known Problems Brother    • No Known Problems Brother    • Cancer Maternal Aunt    • Sleep Apnea Neg Hx      She  has a past medical history of Asthma, Bronchitis, Depression, Influenza, Pap smear, Positive PPD, and Tonsillitis.   Past Surgical History:   Procedure Laterality Date   • COLONOSCOPY  2/17/2016    internal hemorrhoids   • HEMORRHOIDECTOMY  5/08   • COLONOSCOPY  2005    normal   • GASTRIC BYPASS LAPAROSCOPIC  2001   • NODULE EXCISION      Performed by EDELMIRA MONTE at SURGERY [facility] ORS   • PB REMV 2ND CATARACT,CORN-SCLER SECTN     • TONSILLECTOMY     • US-NEEDLE CORE BX-BREAST PANEL             Exam:     There were no vitals taken for this visit. There is no height or weight on file to calculate BMI.    Hearing good.    Dentition good  Alert, oriented in no acute distress.  Eye contact is good, speech goal directed, affect calm  CV:  Normal S1, s2, lungs clear    Assessment and Plan. The following treatment and monitoring plan is recommended:  1. Obesity (BMI 30-39.9)  Chronic, ongoing. Encouraged walking, difficulty with left chronic knee pain  - Lipid Profile; Future    2. Mild intermittent asthma without  complication  Chronic, ongoing. Well controlled with dulera/intermittent albuterol. Followed by allergist, Dr. Malhotra    3. Age-related osteoporosis without current pathological fracture  Chronic, ongoing. Due for density test.  Taking calcium/d.  Stopped fosomax due to side effects.  Monitor.   - DS-BONE DENSITY STUDY (DEXA); Future    4. Urge incontinence of urine  Chornic, ongoing, controlled with oxybutinin 1-2 daily.  Refill today.      5. Vitamin D deficiency  Chornic, ongoing. Taking supplements  Due for labs.  Monitor and follow.   - VITAMIN D,25 HYDROXY; Future    6. STEVEN (obstructive sleep apnea)  Chronic, ongoing untreated.  Labs due monitor.   - CBC WITH DIFFERENTIAL; Future  - Comp Metabolic Panel; Future    7. Chronic fatigue  Chronic, ongoing, may be due to chronic knee pain.  Will check TSH. Monitor and follow.   - TSH; Future    No diagnosis found.      Services suggested: No services needed at this time  Health Care Screening recommendations as per orders if indicated.  Referrals offered: PT/OT/Nutrition counseling/Behavioral Health/Smoking cessation as per orders if indicated.    Discussion today about general wellness and lifestyle habits:    · Prevent falls and reduce trip hazards; Cautioned about securing or removing rugs.  · Have a working fire alarm and carbon monoxide detector;   · Engage in regular physical activity and social activities       Follow-up: No follow-ups on file.

## 2020-07-29 ENCOUNTER — HOSPITAL ENCOUNTER (OUTPATIENT)
Dept: RADIOLOGY | Facility: MEDICAL CENTER | Age: 70
End: 2020-07-29
Attending: NURSE PRACTITIONER
Payer: MEDICARE

## 2020-07-29 DIAGNOSIS — Z12.31 ENCOUNTER FOR MAMMOGRAM TO ESTABLISH BASELINE MAMMOGRAM: ICD-10-CM

## 2020-07-29 DIAGNOSIS — M81.0 AGE-RELATED OSTEOPOROSIS WITHOUT CURRENT PATHOLOGICAL FRACTURE: ICD-10-CM

## 2020-07-29 PROCEDURE — 77067 SCR MAMMO BI INCL CAD: CPT

## 2020-07-29 PROCEDURE — 77080 DXA BONE DENSITY AXIAL: CPT

## 2020-08-29 ENCOUNTER — PATIENT MESSAGE (OUTPATIENT)
Dept: MEDICAL GROUP | Facility: PHYSICIAN GROUP | Age: 70
End: 2020-08-29

## 2020-10-05 ENCOUNTER — NON-PROVIDER VISIT (OUTPATIENT)
Dept: MEDICAL GROUP | Facility: PHYSICIAN GROUP | Age: 70
End: 2020-10-05
Payer: MEDICARE

## 2020-10-05 DIAGNOSIS — Z23 NEED FOR VACCINATION: ICD-10-CM

## 2020-10-05 PROCEDURE — G0008 ADMIN INFLUENZA VIRUS VAC: HCPCS | Performed by: NURSE PRACTITIONER

## 2020-10-05 PROCEDURE — 90662 IIV NO PRSV INCREASED AG IM: CPT | Performed by: NURSE PRACTITIONER

## 2020-10-05 NOTE — PROGRESS NOTES
"Betty Natarajan is a 70 y.o. female here for a non-provider visit for:   FLU    Reason for immunization: Annual Flu Vaccine  Immunization records indicate need for vaccine: Yes, confirmed with Epic  Minimum interval has been met for this vaccine: Yes  ABN completed: Not Indicated    Order and dose verified by: JT  VIS Dated  8/15/2019 was given to patient: Yes  All IAC Questionnaire questions were answered \"No.\"    Patient tolerated injection and no adverse effects were observed or reported: Yes    Pt scheduled for next dose in series: Not Indicated  "

## 2020-11-05 ENCOUNTER — HOSPITAL ENCOUNTER (OUTPATIENT)
Facility: MEDICAL CENTER | Age: 70
End: 2020-11-05
Attending: PHYSICIAN ASSISTANT
Payer: MEDICARE

## 2020-11-05 ENCOUNTER — OFFICE VISIT (OUTPATIENT)
Dept: URGENT CARE | Facility: PHYSICIAN GROUP | Age: 70
End: 2020-11-05
Payer: MEDICARE

## 2020-11-05 VITALS
SYSTOLIC BLOOD PRESSURE: 132 MMHG | WEIGHT: 215 LBS | BODY MASS INDEX: 31.84 KG/M2 | DIASTOLIC BLOOD PRESSURE: 94 MMHG | OXYGEN SATURATION: 96 % | HEART RATE: 86 BPM | TEMPERATURE: 98.6 F | RESPIRATION RATE: 16 BRPM | HEIGHT: 69 IN

## 2020-11-05 DIAGNOSIS — R05.9 COUGH: ICD-10-CM

## 2020-11-05 PROCEDURE — U0003 INFECTIOUS AGENT DETECTION BY NUCLEIC ACID (DNA OR RNA); SEVERE ACUTE RESPIRATORY SYNDROME CORONAVIRUS 2 (SARS-COV-2) (CORONAVIRUS DISEASE [COVID-19]), AMPLIFIED PROBE TECHNIQUE, MAKING USE OF HIGH THROUGHPUT TECHNOLOGIES AS DESCRIBED BY CMS-2020-01-R: HCPCS

## 2020-11-05 PROCEDURE — 99213 OFFICE O/P EST LOW 20 MIN: CPT | Performed by: PHYSICIAN ASSISTANT

## 2020-11-05 ASSESSMENT — ENCOUNTER SYMPTOMS
PALPITATIONS: 0
COUGH: 1
WHEEZING: 1
SPUTUM PRODUCTION: 1
SHORTNESS OF BREATH: 1
MYALGIAS: 1
FEVER: 1
CHILLS: 1
SORE THROAT: 1

## 2020-11-05 ASSESSMENT — FIBROSIS 4 INDEX: FIB4 SCORE: 1.1

## 2020-11-06 NOTE — PROGRESS NOTES
Subjective:   Betty Natarajan is a 70 y.o. female who presents today with   Chief Complaint   Patient presents with   • Cough     congestion, x2 days       Cough  This is a new problem. Episode onset: 4 days. The problem has been gradually improving. The problem occurs every few minutes. The cough is productive of sputum. Associated symptoms include chills, a fever, myalgias, a sore throat, shortness of breath and wheezing. Pertinent negatives include no chest pain. She has tried a beta-agonist inhaler for the symptoms. The treatment provided moderate relief. Her past medical history is significant for asthma and bronchitis.     I personally donned proper PPE throughout visit today.   No known COVID exposure.   PMH:  has a past medical history of Asthma, Bronchitis, Depression, Influenza, Pap smear, Positive PPD, and Tonsillitis.  MEDS:   Current Outpatient Medications:   •  oxybutynin (DITROPAN) 5 MG Tab, Take 1 Tab by mouth 2 times a day as needed., Disp: 180 Tab, Rfl: 3  •  guaiFENesin (MUCINEX PO), Take  by mouth., Disp: , Rfl:   •  albuterol 108 (90 Base) MCG/ACT Aero Soln inhalation aerosol, Inhale 2 Puffs by mouth every 6 hours as needed for Shortness of Breath., Disp: , Rfl:   •  Melatonin 5 MG Cap, Take  by mouth at bedtime as needed., Disp: , Rfl:   •  Probiotic Product (PROBIOTIC ACIDOPHILUS) Cap, Take  by mouth every day., Disp: , Rfl:   •  Mometasone Furo-Formoterol Fum (DULERA) 200-5 MCG/ACT Aerosol, Inhale  by mouth., Disp: , Rfl:   •  vitamin D (CHOLECALCIFEROL) 1000 UNIT TABS, Take 1,000 Units by mouth every day., Disp: , Rfl:   ALLERGIES:   Allergies   Allergen Reactions   • Pcn [Penicillins]      Hives itching      SURGHX:   Past Surgical History:   Procedure Laterality Date   • COLONOSCOPY  2/17/2016    internal hemorrhoids   • HEMORRHOIDECTOMY  5/08   • COLONOSCOPY  2005    normal   • GASTRIC BYPASS LAPAROSCOPIC  2001   • NODULE EXCISION      Performed by EDELMIRA MONTE at SURGERY [facility]  "ORS   • PB REMV 2ND CATARACT,CORN-SCLER SECTN     • TONSILLECTOMY     • US-NEEDLE CORE BX-BREAST PANEL       SOCHX:  reports that she has never smoked. She has never used smokeless tobacco. She reports current alcohol use. She reports that she does not use drugs.  FH: Reviewed with patient, not pertinent to this visit.       Review of Systems   Constitutional: Positive for chills and fever.   HENT: Positive for congestion and sore throat.    Respiratory: Positive for cough, sputum production, shortness of breath and wheezing.    Cardiovascular: Negative for chest pain and palpitations.   Musculoskeletal: Positive for myalgias.   All other systems reviewed and are negative.       Objective:   /94   Pulse 86   Temp 37 °C (98.6 °F) (Temporal)   Resp 16   Ht 1.753 m (5' 9\")   Wt 97.5 kg (215 lb)   LMP 05/17/2007   SpO2 96%   BMI 31.75 kg/m²   Physical Exam  Vitals signs and nursing note reviewed.   Constitutional:       General: She is not in acute distress.     Appearance: Normal appearance. She is well-developed. She is not ill-appearing or toxic-appearing.   HENT:      Head: Normocephalic and atraumatic.      Right Ear: Hearing, tympanic membrane and ear canal normal.      Left Ear: Hearing, tympanic membrane and ear canal normal.      Nose: Congestion present.      Mouth/Throat:      Mouth: Mucous membranes are moist.      Pharynx: Posterior oropharyngeal erythema present.   Eyes:      Conjunctiva/sclera: Conjunctivae normal.   Cardiovascular:      Rate and Rhythm: Normal rate and regular rhythm.      Heart sounds: Normal heart sounds.   Pulmonary:      Effort: Pulmonary effort is normal.      Breath sounds: Normal breath sounds. No stridor. No wheezing, rhonchi or rales.      Comments: Congested cough on exam  Musculoskeletal:      Comments: Normal movement in all 4 extremities   Lymphadenopathy:      Cervical: No cervical adenopathy.   Skin:     General: Skin is warm and dry.   Neurological:      " Mental Status: She is alert.      Coordination: Coordination normal.   Psychiatric:         Mood and Affect: Mood normal.           Assessment/Plan:   Assessment    1. Cough  - COVID/SARS CoV-2 PCR; Future  Discussed CDC guidelines including self isolation at home.   Patient encouraged to get plenty of rest, use OTC tylenol for pain/fever, and drink plenty of fluids.  Continue inhalers as prescribed.  Differential diagnosis, natural history, supportive care, and indications for immediate follow-up discussed.   Patient given instructions and understanding of medications and treatment.    If not improving in 3-5 days, F/U with PCP or return to  if symptoms worsen.    Patient agreeable to plan.      Please note that this dictation was created using voice recognition software. I have made every reasonable attempt to correct obvious errors, but I expect that there are errors of grammar and possibly content that I did not discover before finalizing the note.    Elmer Velasco PA-C

## 2020-11-07 LAB
COVID ORDER STATUS COVID19: NORMAL
SARS-COV-2 RNA RESP QL NAA+PROBE: NOTDETECTED
SPECIMEN SOURCE: NORMAL

## 2020-11-09 ENCOUNTER — PATIENT MESSAGE (OUTPATIENT)
Dept: MEDICAL GROUP | Facility: PHYSICIAN GROUP | Age: 70
End: 2020-11-09

## 2020-11-09 ENCOUNTER — TELEMEDICINE (OUTPATIENT)
Dept: MEDICAL GROUP | Facility: PHYSICIAN GROUP | Age: 70
End: 2020-11-09
Payer: MEDICARE

## 2020-11-09 VITALS
TEMPERATURE: 99.2 F | WEIGHT: 215 LBS | HEART RATE: 70 BPM | DIASTOLIC BLOOD PRESSURE: 75 MMHG | BODY MASS INDEX: 31.84 KG/M2 | SYSTOLIC BLOOD PRESSURE: 146 MMHG | HEIGHT: 69 IN

## 2020-11-09 DIAGNOSIS — R05.9 COUGH: ICD-10-CM

## 2020-11-09 PROCEDURE — 99214 OFFICE O/P EST MOD 30 MIN: CPT | Mod: 95,CR | Performed by: NURSE PRACTITIONER

## 2020-11-09 RX ORDER — DOXYCYCLINE HYCLATE 100 MG
100 TABLET ORAL 2 TIMES DAILY
Qty: 20 TAB | Refills: 0 | Status: SHIPPED
Start: 2020-11-09 | End: 2021-05-25

## 2020-11-09 RX ORDER — DEXAMETHASONE 4 MG/1
2 TABLET ORAL 2 TIMES DAILY
Qty: 10 TAB | Refills: 0 | Status: SHIPPED
Start: 2020-11-09 | End: 2021-05-25

## 2020-11-09 ASSESSMENT — FIBROSIS 4 INDEX: FIB4 SCORE: 1.1

## 2020-11-09 NOTE — ASSESSMENT & PLAN NOTE
Reports one week of cough and congestion.  Seen in UC with negative COVID.  Yellow sputum reported.  Has been running a fever.  99.4 temp reported.  No shortness of breath reported.  Some pain behind left eye.  mucinex and vit c.

## 2020-11-09 NOTE — PROGRESS NOTES
Virtual Visit: Established Patient   This visit was conducted via Zoom using secure and encrypted videoconferencing technology. The patient was in a private location in the state of [place].    The patient's identity was confirmed and verbal consent was obtained for this virtual visit.    Subjective:   CC:   Chief Complaint   Patient presents with   • URI     negative for COVID   • Fever     on and off x's 1 week        Betty Natarajan is a 70 y.o. female presenting for evaluation and management of:    No problem-specific Assessment & Plan notes found for this encounter.    Cough  Reports one week of cough and congestion.  Seen in UC with negative COVID.  Yellow sputum reported.  Has been running a fever.  99.4 temp reported.  No shortness of breath reported.  Some pain behind left eye.  mucinex and vit c.      ROS   + fever, cough and sinus pressure. . No nausea or vomiting. No chest pains or shortness of breath.     Allergies   Allergen Reactions   • Pcn [Penicillins]      Hives itching        Current medicines (including changes today)  Current Outpatient Medications   Medication Sig Dispense Refill   • doxycycline (VIBRAMYCIN) 100 MG Tab Take 1 Tab by mouth 2 times a day. 20 Tab 0   • dexamethasone (DECADRON) 4 MG Tab Take 0.5 Tabs by mouth 2 times a day. 10 Tab 0   • oxybutynin (DITROPAN) 5 MG Tab Take 1 Tab by mouth 2 times a day as needed. 180 Tab 3   • guaiFENesin (MUCINEX PO) Take  by mouth.     • albuterol 108 (90 Base) MCG/ACT Aero Soln inhalation aerosol Inhale 2 Puffs by mouth every 6 hours as needed for Shortness of Breath.     • Melatonin 5 MG Cap Take  by mouth at bedtime as needed.     • Probiotic Product (PROBIOTIC ACIDOPHILUS) Cap Take  by mouth every day.     • Mometasone Furo-Formoterol Fum (DULERA) 200-5 MCG/ACT Aerosol Inhale  by mouth.     • vitamin D (CHOLECALCIFEROL) 1000 UNIT TABS Take 1,000 Units by mouth every day.       No current facility-administered medications for this visit.   "      Patient Active Problem List    Diagnosis Date Noted   • Cough 11/09/2020   • Memory loss 07/31/2019   • Age-related osteoporosis without current pathological fracture 02/02/2018   • History of kidney stones 07/25/2017   • Urge incontinence of urine 07/25/2017   • STEVEN (obstructive sleep apnea) 05/05/2017   • Obesity (BMI 30-39.9) 12/14/2016   • Mild intermittent asthma without complication 02/01/2013   • Vitamin D deficiency 08/04/2010       Family History   Problem Relation Age of Onset   • Diabetes Father    • Hypertension Father    • Genitourinary () Problems Father         renal failure on dialysis   • Cancer Brother         brain tumor   • Breast Cancer Daughter    • Heart Attack Brother    • No Known Problems Brother    • No Known Problems Brother    • Cancer Maternal Aunt    • Sleep Apnea Neg Hx        She  has a past medical history of Asthma, Bronchitis, Depression, Influenza, Pap smear, Positive PPD, and Tonsillitis.  She  has a past surgical history that includes hemorrhoidectomy (5/08); nodule excision; gastric bypass laparoscopic (2001); colonoscopy (2005); us-needle core bx-breast panel; colonoscopy (2/17/2016); pr remv 2nd cataract,corn-scler sectn; and tonsillectomy.       Objective:   /75   Pulse 70   Temp 37.3 °C (99.2 °F)   Ht 1.753 m (5' 9\")   Wt 97.5 kg (215 lb)   LMP 05/17/2007   BMI 31.75 kg/m²     Physical Exam:  Constitutional: Alert, no distress, well-groomed.  Skin: No rashes in visible areas.  Eye: Round. Conjunctiva clear, lids normal. No icterus.   ENMT: Lips pink without lesions, good dentition, moist mucous membranes. Phonation normal.  Neck: No masses, no thyromegaly. Moves freely without pain.  Respiratory: Unlabored respiratory effort, productive cough reported or audible wheeze  Psych: Alert and oriented x3, normal affect and mood.       Assessment and Plan:   The following treatment plan was discussed:     1. Cough  New acute issue.  This could be a false " negative covid, continue isolation for 14 days onset symptoms.  This could also represent a bronchitis/sinusitis.  Doxycycline and dexamethasone sent to pharmacy.  Follow up via my chart in 3 days.  Monitor and follow.     Other orders  - doxycycline (VIBRAMYCIN) 100 MG Tab; Take 1 Tab by mouth 2 times a day.  Dispense: 20 Tab; Refill: 0  - dexamethasone (DECADRON) 4 MG Tab; Take 0.5 Tabs by mouth 2 times a day.  Dispense: 10 Tab; Refill: 0        Follow-up: Return if symptoms worsen or fail to improve.

## 2020-11-10 ENCOUNTER — PATIENT MESSAGE (OUTPATIENT)
Dept: MEDICAL GROUP | Facility: PHYSICIAN GROUP | Age: 70
End: 2020-11-10

## 2020-11-23 ENCOUNTER — PATIENT MESSAGE (OUTPATIENT)
Dept: MEDICAL GROUP | Facility: PHYSICIAN GROUP | Age: 70
End: 2020-11-23

## 2020-11-23 NOTE — TELEPHONE ENCOUNTER
From: Betty Natarajan  To: BRANDO Garrido  Sent: 11/23/2020 10:24 AM PST  Subject: Non-Urgent Medical Question    yes, still swollen that picture was taken this morning. I did use a new facial moisturizer, I thought that might be it, only used once. I'll try Benadryl.      ----- Message -----   From:BRANDO Garrido   Sent:11/23/2020 10:19 AM PST   To:Betty Natarajan   Subject:RE: Non-Urgent Medical Question    Betty  Are you eyes and cheeks still swollen? Are they tender? What have you tried or taken? Ice, benadryl? Any new medications/facial soaps/make up products?  BRANDO Garrido      ----- Message -----   From:Betty Natarajan   Sent:11/23/2020 10:03 AM PST   To:BRANDO Garrido   Subject:Non-Urgent Medical Question    Woke up Saturday with swollen cheeks and puffy eyes, I don't know if it could be a sinus infection or what, no temperature. Do you think I should come in or make an appointment?  Thank you, Betty

## 2020-11-24 ENCOUNTER — PATIENT MESSAGE (OUTPATIENT)
Dept: MEDICAL GROUP | Facility: PHYSICIAN GROUP | Age: 70
End: 2020-11-24

## 2021-01-15 ENCOUNTER — PATIENT MESSAGE (OUTPATIENT)
Dept: MEDICAL GROUP | Facility: PHYSICIAN GROUP | Age: 71
End: 2021-01-15

## 2021-01-15 DIAGNOSIS — Z23 NEED FOR VACCINATION: ICD-10-CM

## 2021-05-25 ENCOUNTER — OFFICE VISIT (OUTPATIENT)
Dept: MEDICAL GROUP | Facility: PHYSICIAN GROUP | Age: 71
End: 2021-05-25
Payer: MEDICARE

## 2021-05-25 ENCOUNTER — HOSPITAL ENCOUNTER (OUTPATIENT)
Dept: LAB | Facility: MEDICAL CENTER | Age: 71
End: 2021-05-25
Attending: NURSE PRACTITIONER
Payer: MEDICARE

## 2021-05-25 VITALS
SYSTOLIC BLOOD PRESSURE: 120 MMHG | DIASTOLIC BLOOD PRESSURE: 80 MMHG | OXYGEN SATURATION: 95 % | BODY MASS INDEX: 32.13 KG/M2 | WEIGHT: 212 LBS | HEART RATE: 78 BPM | TEMPERATURE: 97.3 F | HEIGHT: 68 IN | RESPIRATION RATE: 18 BRPM

## 2021-05-25 DIAGNOSIS — E55.9 VITAMIN D DEFICIENCY: ICD-10-CM

## 2021-05-25 DIAGNOSIS — K21.9 GASTROESOPHAGEAL REFLUX DISEASE WITHOUT ESOPHAGITIS: ICD-10-CM

## 2021-05-25 DIAGNOSIS — E66.9 OBESITY (BMI 30-39.9): ICD-10-CM

## 2021-05-25 DIAGNOSIS — Z01.84 ANTIBODY RESPONSE EXAM: ICD-10-CM

## 2021-05-25 DIAGNOSIS — R73.9 ELEVATED BLOOD SUGAR: ICD-10-CM

## 2021-05-25 DIAGNOSIS — J45.20 MILD INTERMITTENT ASTHMA WITHOUT COMPLICATION: ICD-10-CM

## 2021-05-25 DIAGNOSIS — M81.0 AGE-RELATED OSTEOPOROSIS WITHOUT CURRENT PATHOLOGICAL FRACTURE: ICD-10-CM

## 2021-05-25 DIAGNOSIS — Z12.11 SCREEN FOR COLON CANCER: ICD-10-CM

## 2021-05-25 LAB
ALBUMIN SERPL BCP-MCNC: 4.3 G/DL (ref 3.2–4.9)
ALBUMIN/GLOB SERPL: 1.6 G/DL
ALP SERPL-CCNC: 79 U/L (ref 30–99)
ALT SERPL-CCNC: 19 U/L (ref 2–50)
ANION GAP SERPL CALC-SCNC: 8 MMOL/L (ref 7–16)
AST SERPL-CCNC: 23 U/L (ref 12–45)
BASOPHILS # BLD AUTO: 0.6 % (ref 0–1.8)
BASOPHILS # BLD: 0.05 K/UL (ref 0–0.12)
BILIRUB SERPL-MCNC: 0.7 MG/DL (ref 0.1–1.5)
BUN SERPL-MCNC: 19 MG/DL (ref 8–22)
CALCIUM SERPL-MCNC: 9.9 MG/DL (ref 8.5–10.5)
CHLORIDE SERPL-SCNC: 101 MMOL/L (ref 96–112)
CHOLEST SERPL-MCNC: 151 MG/DL (ref 100–199)
CO2 SERPL-SCNC: 27 MMOL/L (ref 20–33)
CREAT SERPL-MCNC: 0.68 MG/DL (ref 0.5–1.4)
EOSINOPHIL # BLD AUTO: 0.08 K/UL (ref 0–0.51)
EOSINOPHIL NFR BLD: 1 % (ref 0–6.9)
ERYTHROCYTE [DISTWIDTH] IN BLOOD BY AUTOMATED COUNT: 49.7 FL (ref 35.9–50)
EST. AVERAGE GLUCOSE BLD GHB EST-MCNC: 103 MG/DL
FASTING STATUS PATIENT QL REPORTED: NORMAL
GLOBULIN SER CALC-MCNC: 2.7 G/DL (ref 1.9–3.5)
GLUCOSE SERPL-MCNC: 84 MG/DL (ref 65–99)
HBA1C MFR BLD: 5.2 % (ref 4–5.6)
HCT VFR BLD AUTO: 45.2 % (ref 37–47)
HDLC SERPL-MCNC: 72 MG/DL
HGB BLD-MCNC: 14.2 G/DL (ref 12–16)
IMM GRANULOCYTES # BLD AUTO: 0.02 K/UL (ref 0–0.11)
IMM GRANULOCYTES NFR BLD AUTO: 0.2 % (ref 0–0.9)
LDLC SERPL CALC-MCNC: 63 MG/DL
LYMPHOCYTES # BLD AUTO: 2.13 K/UL (ref 1–4.8)
LYMPHOCYTES NFR BLD: 25.7 % (ref 22–41)
MCH RBC QN AUTO: 29.9 PG (ref 27–33)
MCHC RBC AUTO-ENTMCNC: 31.4 G/DL (ref 33.6–35)
MCV RBC AUTO: 95.2 FL (ref 81.4–97.8)
MONOCYTES # BLD AUTO: 0.66 K/UL (ref 0–0.85)
MONOCYTES NFR BLD AUTO: 8 % (ref 0–13.4)
NEUTROPHILS # BLD AUTO: 5.34 K/UL (ref 2–7.15)
NEUTROPHILS NFR BLD: 64.5 % (ref 44–72)
NRBC # BLD AUTO: 0 K/UL
NRBC BLD-RTO: 0 /100 WBC
PLATELET # BLD AUTO: 313 K/UL (ref 164–446)
PMV BLD AUTO: 11.2 FL (ref 9–12.9)
POTASSIUM SERPL-SCNC: 4.3 MMOL/L (ref 3.6–5.5)
PROT SERPL-MCNC: 7 G/DL (ref 6–8.2)
RBC # BLD AUTO: 4.75 M/UL (ref 4.2–5.4)
SODIUM SERPL-SCNC: 136 MMOL/L (ref 135–145)
TRIGL SERPL-MCNC: 79 MG/DL (ref 0–149)
WBC # BLD AUTO: 8.3 K/UL (ref 4.8–10.8)

## 2021-05-25 PROCEDURE — 82306 VITAMIN D 25 HYDROXY: CPT

## 2021-05-25 PROCEDURE — 83036 HEMOGLOBIN GLYCOSYLATED A1C: CPT | Mod: GA

## 2021-05-25 PROCEDURE — 80061 LIPID PANEL: CPT

## 2021-05-25 PROCEDURE — 86769 SARS-COV-2 COVID-19 ANTIBODY: CPT

## 2021-05-25 PROCEDURE — 36415 COLL VENOUS BLD VENIPUNCTURE: CPT

## 2021-05-25 PROCEDURE — 80053 COMPREHEN METABOLIC PANEL: CPT

## 2021-05-25 PROCEDURE — 99214 OFFICE O/P EST MOD 30 MIN: CPT | Performed by: NURSE PRACTITIONER

## 2021-05-25 PROCEDURE — 85025 COMPLETE CBC W/AUTO DIFF WBC: CPT

## 2021-05-25 RX ORDER — ZOSTER VACCINE RECOMBINANT, ADJUVANTED 50 MCG/0.5
KIT INTRAMUSCULAR
COMMUNITY
End: 2021-09-07

## 2021-05-25 ASSESSMENT — FIBROSIS 4 INDEX: FIB4 SCORE: 1.1

## 2021-05-25 ASSESSMENT — PATIENT HEALTH QUESTIONNAIRE - PHQ9: CLINICAL INTERPRETATION OF PHQ2 SCORE: 0

## 2021-05-25 NOTE — ASSESSMENT & PLAN NOTE
Reports recent fall and this was at night, caught foot in doggy door.  Large bruise.  Ortho with steroid shot.

## 2021-05-25 NOTE — PROGRESS NOTES
"Chief Complaint   Patient presents with   • Annual Exam   • Requesting Labs       Subjective:   Betty Natarajan is a 70 y.o. female here today for annual preventative    Vitamin D deficiency  Continuing to take supplemental doses.     Mild intermittent asthma without complication  Reports some increase use of albuterol with pollen.  Dulera.  Seeing allergies.     Gastroesophageal reflux disease without esophagitis  Started takin gomeprazole and this is helping both gerd and asthma/  Gastric bypass 2011.     Age-related osteoporosis without current pathological fracture  Reports recent fall and this was at night, caught foot in doggy door.  Large bruise.  Ortho with steroid shot.      Due for 5 year recall colonoscopy.  Order provided/referral    Current medicines (including changes today)  Current Outpatient Medications   Medication Sig Dispense Refill   • oxybutynin (DITROPAN) 5 MG Tab Take 1 Tab by mouth 2 times a day as needed. 180 Tab 3   • albuterol 108 (90 Base) MCG/ACT Aero Soln inhalation aerosol Inhale 2 Puffs by mouth every 6 hours as needed for Shortness of Breath.     • Melatonin 5 MG Cap Take  by mouth at bedtime as needed.     • Probiotic Product (PROBIOTIC ACIDOPHILUS) Cap Take  by mouth every day.     • Mometasone Furo-Formoterol Fum (DULERA) 200-5 MCG/ACT Aerosol Inhale  by mouth.     • vitamin D (CHOLECALCIFEROL) 1000 UNIT TABS Take 1,000 Units by mouth every day.       No current facility-administered medications for this visit.     She  has a past medical history of Asthma, Bronchitis, Depression, Influenza, Pap smear, Positive PPD, and Tonsillitis.    ROS as stated in hpi  No chest pain, no shortness of breath, no abdominal pain       Objective:     /80 (BP Location: Left arm, Patient Position: Sitting)   Pulse 78   Temp 36.3 °C (97.3 °F) (Temporal)   Resp 18   Ht 1.73 m (5' 8.11\")   Wt 96.2 kg (212 lb)   SpO2 95%  Body mass index is 32.13 kg/m².   Physical Exam:  Constitutional: " Alert, no distress.  Skin: Warm, dry, good turgor,no cyanosis, no rashes in visible areas.  Eye: Equal, round and reactive, conjunctiva clear, lids normal.  Neck: Trachea midline, no masses, no obvious thyroid enlargement.. No cervical or supraclavicular lymphadenopathy. Range of motion within normal limits.  Neuro: Cranial nerves 2-12 grossly intact.  No sensory deficit.  Respiratory: Unlabored respiratory effort, lungs clear to auscultation, no wheezes, no ronchi.  Cardiovascular: Normal S1, S2, no murmur, no edema.  Abdomen: Soft, non-tender, no masses, no guarding,  no hepatosplenomegaly.  Psych: Alert and oriented x3, normal affect and mood and judgement.        Assessment and Plan:   The following treatment plan was discussed    1. Vitamin D deficiency  Chronic, ongoing, continue with supplementation.   - VITAMIN D,25 HYDROXY; Future    2. Mild intermittent asthma without complication  Chronic, ongoing. Recent flare.  Seeing allergy doctor.  She has increased her dulera.  Encouraged her to continue to control allergies with otc meds.     3. Gastroesophageal reflux disease without esophagitis  Chronic, ongoing, post gastric bypass.  Omeprazole daily.     4. Age-related osteoporosis without current pathological fracture  Chronic, ongoing, recent fall without fracture.  Continue calcium, vit d.  Encouraged walking.     5. Antibody response exam  Requesting covid antibody test.  Ill this past fall. Monitor.   - SARS CoV-2 Ab, Total; Future    6. Obesity (BMI 30-39.9)  Chronic, ongoing. Hx of gastric bypass.  Encouraged lifestyle management.   - CBC WITH DIFFERENTIAL; Future  - Comp Metabolic Panel; Future  - Lipid Profile; Future    7. Elevated blood sugar  Chronic, past hx.  Will check A1c.  monitor  - HEMOGLOBIN A1C; Future    8. Screen for colon cancer  Due for 5 year recall.  Referral placed.  Some blood with BM, hx of hemorrhoids.  Encouraged fluids and fiber.   - REFERRAL TO GASTROENTEROLOGY    Discussion  today about general wellness and lifestyle habits:   *engage in regular physical and social activities   *prevent falls and reduce trip hazards, using ambulatory aids, hearing and vision testing if appropriate   *skin care, including sunscreen        Followup: Return in about 1 year (around 5/25/2022) for Annual.         Educated in proper administration of medication(s) ordered today including safety, possible SE, risks, benefits, rationale and alternatives to therapy.     Please note that this dictation was created using voice recognition software. I have made every reasonable attempt to correct obvious errors, but I expect that there are errors of grammar and possibly content that I did not discover before finalizing the note.

## 2021-05-27 LAB — 25(OH)D3 SERPL-MCNC: 42 NG/ML (ref 30–80)

## 2021-05-28 LAB — SARS-COV-2 AB SERPL QL IA: NORMAL

## 2021-09-07 ENCOUNTER — OFFICE VISIT (OUTPATIENT)
Dept: MEDICAL GROUP | Facility: PHYSICIAN GROUP | Age: 71
End: 2021-09-07
Payer: MEDICARE

## 2021-09-07 VITALS
HEART RATE: 74 BPM | WEIGHT: 210 LBS | TEMPERATURE: 98.1 F | RESPIRATION RATE: 16 BRPM | BODY MASS INDEX: 31.83 KG/M2 | OXYGEN SATURATION: 95 % | SYSTOLIC BLOOD PRESSURE: 128 MMHG | DIASTOLIC BLOOD PRESSURE: 80 MMHG | HEIGHT: 68 IN

## 2021-09-07 DIAGNOSIS — G89.29 CHRONIC PAIN OF RIGHT HIP: ICD-10-CM

## 2021-09-07 DIAGNOSIS — M25.551 CHRONIC PAIN OF RIGHT HIP: ICD-10-CM

## 2021-09-07 DIAGNOSIS — Z01.818 PREOP EXAMINATION: ICD-10-CM

## 2021-09-07 PROCEDURE — 93000 ELECTROCARDIOGRAM COMPLETE: CPT | Performed by: NURSE PRACTITIONER

## 2021-09-07 PROCEDURE — 99213 OFFICE O/P EST LOW 20 MIN: CPT | Performed by: NURSE PRACTITIONER

## 2021-09-07 ASSESSMENT — FIBROSIS 4 INDEX: FIB4 SCORE: 1.21

## 2021-09-07 NOTE — ASSESSMENT & PLAN NOTE
Has upcoming right hip replacement on 10/5/21.  Dr. Appiah.   Chronic discomfort. Taking tylenol and alieve around the clock per Dr. Appiah.   CBD cream is helping  No heart issues, no chest pain, shortness of breath.  No medication concerns at this time.  EKG normal sinus rhythmn

## 2021-09-07 NOTE — PROGRESS NOTES
"Chief Complaint   Patient presents with   • Medical Clearance     right hip replacement        Subjective:   Betty Natarajan is a 71 y.o. female here today for evaluation and management of:    Preop examination  Here today for preop clearance for upcoming total hip, right.     Chronic pain of right hip  Has upcoming right hip replacement on 10/5/21.  Dr. Appiah.   Chronic discomfort. Taking tylenol and alieve around the clock per Dr. Appiah.   CBD cream is helping  No heart issues, no chest pain, shortness of breath.  No medication concerns at this time.  EKG normal sinus rhythmn           Current medicines (including changes today)  Current Outpatient Medications   Medication Sig Dispense Refill   • oxybutynin (DITROPAN) 5 MG Tab Take 1 Tab by mouth 2 times a day as needed. 180 Tab 3   • albuterol 108 (90 Base) MCG/ACT Aero Soln inhalation aerosol Inhale 2 Puffs by mouth every 6 hours as needed for Shortness of Breath.     • Melatonin 5 MG Cap Take  by mouth at bedtime as needed.     • Probiotic Product (PROBIOTIC ACIDOPHILUS) Cap Take  by mouth every day.     • Mometasone Furo-Formoterol Fum (DULERA) 200-5 MCG/ACT Aerosol Inhale  by mouth.     • vitamin D (CHOLECALCIFEROL) 1000 UNIT TABS Take 1,000 Units by mouth every day.       No current facility-administered medications for this visit.     She  has a past medical history of Asthma, Bronchitis, Depression, Influenza, Pap smear, Positive PPD, and Tonsillitis.    ROS as stated in hpi  No chest pain, no shortness of breath, no abdominal pain       Objective:     /80 (BP Location: Left arm, Patient Position: Sitting)   Pulse 74   Temp 36.7 °C (98.1 °F) (Temporal)   Resp 16   Ht 1.73 m (5' 8.11\")   Wt 95.3 kg (210 lb)   SpO2 95%  Body mass index is 31.83 kg/m². stable.   Physical Exam:  Constitutional: Alert, no distress.  Skin: Warm, dry, good turgor,no cyanosis, no rashes in visible areas.  Eye: Equal, round and reactive, conjunctiva clear, lids " normal.  Ears: No tenderness, no discharge.  External canals are without any significant edema or erythema.  Tympanic membranes are without any inflammation, no effusion.  Gross auditory acuity is intact.  Nose: symmetrical without tenderness, no discharge.  Mouth/Throat: lips without lesion.  Oropharynx clear.  Throat without erythema, exudates or tonsillar enlargement.  Neck: Trachea midline, no masses, no obvious thyroid enlargement.. No cervical or supraclavicular lymphadenopathy. Range of motion within normal limits.  Neuro: Cranial nerves 2-12 grossly intact.  No sensory deficit.  Respiratory: Unlabored respiratory effort, lungs clear to auscultation, no wheezes, no ronchi.  Cardiovascular: Normal S1, S2, no murmur, no edema. No bruit noted  Abdomen: Soft, non-tender, no masses, no guarding,  no hepatosplenomegaly. No CVA tenderness  Psych: Alert and oriented x3, normal affect and mood and judgement.  EKG Interpretation    Interpreted by OSIRIS    Rhythm: normal sinus   Rate: 67  Axis: normal  Ectopy: none  Conduction: normal  ST Segments: normal  T Waves: normal  Q Waves: none    Clinical Impression: normal sinus rhythm          Assessment and Plan:   The following treatment plan was discussed    1. Preop examination  Here for preop clearance prior to upcoming right total hip replacement.  All medical conditions are stable.    - EKG - Clinic Performed  - CBC WITH DIFFERENTIAL; Future  - Comp Metabolic Panel; Future  - VITAMIN D,25 HYDROXY; Future  - URINALYSIS,CULTURE IF INDICATED; Future    2. Chronic pain of right hip  Chronic, ongoing. Worsening and ready for surgery with Dr. Appiah 10/5/21.  Clearance today.  Labs and urine ordered.  Monitor and follow.       Followup: Return if symptoms worsen or fail to improve.         Educated in proper administration of medication(s) ordered today including safety, possible SE, risks, benefits, rationale and alternatives to therapy.     Please note that this  dictation was created using voice recognition software. I have made every reasonable attempt to correct obvious errors, but I expect that there are errors of grammar and possibly content that I did not discover before finalizing the note.

## 2021-09-09 ENCOUNTER — HOSPITAL ENCOUNTER (OUTPATIENT)
Dept: LAB | Facility: MEDICAL CENTER | Age: 71
End: 2021-09-09
Attending: NURSE PRACTITIONER
Payer: MEDICARE

## 2021-09-09 DIAGNOSIS — Z01.818 PREOP EXAMINATION: ICD-10-CM

## 2021-09-09 LAB
25(OH)D3 SERPL-MCNC: 47 NG/ML (ref 30–100)
ALBUMIN SERPL BCP-MCNC: 3.9 G/DL (ref 3.2–4.9)
ALBUMIN/GLOB SERPL: 1.8 G/DL
ALP SERPL-CCNC: 91 U/L (ref 30–99)
ALT SERPL-CCNC: 57 U/L (ref 2–50)
ANION GAP SERPL CALC-SCNC: 11 MMOL/L (ref 7–16)
APPEARANCE UR: ABNORMAL
AST SERPL-CCNC: 47 U/L (ref 12–45)
BACTERIA #/AREA URNS HPF: ABNORMAL /HPF
BASOPHILS # BLD AUTO: 1.1 % (ref 0–1.8)
BASOPHILS # BLD: 0.07 K/UL (ref 0–0.12)
BILIRUB SERPL-MCNC: 0.5 MG/DL (ref 0.1–1.5)
BILIRUB UR QL STRIP.AUTO: NEGATIVE
BUN SERPL-MCNC: 18 MG/DL (ref 8–22)
CALCIUM SERPL-MCNC: 9.3 MG/DL (ref 8.5–10.5)
CHLORIDE SERPL-SCNC: 101 MMOL/L (ref 96–112)
CO2 SERPL-SCNC: 25 MMOL/L (ref 20–33)
COLOR UR: YELLOW
CREAT SERPL-MCNC: 0.63 MG/DL (ref 0.5–1.4)
EOSINOPHIL # BLD AUTO: 0.18 K/UL (ref 0–0.51)
EOSINOPHIL NFR BLD: 2.7 % (ref 0–6.9)
EPI CELLS #/AREA URNS HPF: NEGATIVE /HPF
ERYTHROCYTE [DISTWIDTH] IN BLOOD BY AUTOMATED COUNT: 49.7 FL (ref 35.9–50)
GLOBULIN SER CALC-MCNC: 2.2 G/DL (ref 1.9–3.5)
GLUCOSE SERPL-MCNC: 83 MG/DL (ref 65–99)
GLUCOSE UR STRIP.AUTO-MCNC: NEGATIVE MG/DL
HCT VFR BLD AUTO: 39.9 % (ref 37–47)
HGB BLD-MCNC: 13 G/DL (ref 12–16)
HYALINE CASTS #/AREA URNS LPF: ABNORMAL /LPF
IMM GRANULOCYTES # BLD AUTO: 0.02 K/UL (ref 0–0.11)
IMM GRANULOCYTES NFR BLD AUTO: 0.3 % (ref 0–0.9)
KETONES UR STRIP.AUTO-MCNC: NEGATIVE MG/DL
LEUKOCYTE ESTERASE UR QL STRIP.AUTO: ABNORMAL
LYMPHOCYTES # BLD AUTO: 3.11 K/UL (ref 1–4.8)
LYMPHOCYTES NFR BLD: 46.7 % (ref 22–41)
MCH RBC QN AUTO: 30.7 PG (ref 27–33)
MCHC RBC AUTO-ENTMCNC: 32.6 G/DL (ref 33.6–35)
MCV RBC AUTO: 94.3 FL (ref 81.4–97.8)
MICRO URNS: ABNORMAL
MONOCYTES # BLD AUTO: 0.51 K/UL (ref 0–0.85)
MONOCYTES NFR BLD AUTO: 7.7 % (ref 0–13.4)
NEUTROPHILS # BLD AUTO: 2.77 K/UL (ref 2–7.15)
NEUTROPHILS NFR BLD: 41.5 % (ref 44–72)
NITRITE UR QL STRIP.AUTO: POSITIVE
NRBC # BLD AUTO: 0 K/UL
NRBC BLD-RTO: 0 /100 WBC
PH UR STRIP.AUTO: 7 [PH] (ref 5–8)
PLATELET # BLD AUTO: 342 K/UL (ref 164–446)
PMV BLD AUTO: 10.6 FL (ref 9–12.9)
POTASSIUM SERPL-SCNC: 4 MMOL/L (ref 3.6–5.5)
PROT SERPL-MCNC: 6.1 G/DL (ref 6–8.2)
PROT UR QL STRIP: NEGATIVE MG/DL
RBC # BLD AUTO: 4.23 M/UL (ref 4.2–5.4)
RBC # URNS HPF: ABNORMAL /HPF
RBC UR QL AUTO: ABNORMAL
SODIUM SERPL-SCNC: 137 MMOL/L (ref 135–145)
SP GR UR STRIP.AUTO: 1.01
UROBILINOGEN UR STRIP.AUTO-MCNC: 0.2 MG/DL
WBC # BLD AUTO: 6.7 K/UL (ref 4.8–10.8)
WBC #/AREA URNS HPF: ABNORMAL /HPF

## 2021-09-09 PROCEDURE — 87186 SC STD MICRODIL/AGAR DIL: CPT

## 2021-09-09 PROCEDURE — 82306 VITAMIN D 25 HYDROXY: CPT | Mod: GA

## 2021-09-09 PROCEDURE — 36415 COLL VENOUS BLD VENIPUNCTURE: CPT | Mod: GA

## 2021-09-09 PROCEDURE — 80053 COMPREHEN METABOLIC PANEL: CPT

## 2021-09-09 PROCEDURE — 81001 URINALYSIS AUTO W/SCOPE: CPT

## 2021-09-09 PROCEDURE — 87077 CULTURE AEROBIC IDENTIFY: CPT

## 2021-09-09 PROCEDURE — 87086 URINE CULTURE/COLONY COUNT: CPT

## 2021-09-09 PROCEDURE — 85025 COMPLETE CBC W/AUTO DIFF WBC: CPT

## 2021-09-09 RX ORDER — NITROFURANTOIN 25; 75 MG/1; MG/1
100 CAPSULE ORAL 2 TIMES DAILY
Qty: 14 CAPSULE | Refills: 0 | Status: SHIPPED | OUTPATIENT
Start: 2021-09-09 | End: 2021-12-21

## 2021-09-11 LAB
BACTERIA UR CULT: ABNORMAL
BACTERIA UR CULT: ABNORMAL
SIGNIFICANT IND 70042: ABNORMAL
SITE SITE: ABNORMAL
SOURCE SOURCE: ABNORMAL

## 2021-12-21 ENCOUNTER — OFFICE VISIT (OUTPATIENT)
Dept: MEDICAL GROUP | Facility: PHYSICIAN GROUP | Age: 71
End: 2021-12-21
Payer: MEDICARE

## 2021-12-21 VITALS
BODY MASS INDEX: 31.1 KG/M2 | HEART RATE: 99 BPM | TEMPERATURE: 98.6 F | WEIGHT: 210 LBS | SYSTOLIC BLOOD PRESSURE: 132 MMHG | RESPIRATION RATE: 18 BRPM | OXYGEN SATURATION: 93 % | HEIGHT: 69 IN | DIASTOLIC BLOOD PRESSURE: 70 MMHG

## 2021-12-21 DIAGNOSIS — Z12.31 BREAST CANCER SCREENING BY MAMMOGRAM: ICD-10-CM

## 2021-12-21 DIAGNOSIS — R41.3 MEMORY LOSS: ICD-10-CM

## 2021-12-21 DIAGNOSIS — E55.9 VITAMIN D DEFICIENCY: ICD-10-CM

## 2021-12-21 DIAGNOSIS — E66.9 OBESITY (BMI 30-39.9): ICD-10-CM

## 2021-12-21 DIAGNOSIS — J45.20 MILD INTERMITTENT ASTHMA WITHOUT COMPLICATION: ICD-10-CM

## 2021-12-21 DIAGNOSIS — M81.0 AGE-RELATED OSTEOPOROSIS WITHOUT CURRENT PATHOLOGICAL FRACTURE: ICD-10-CM

## 2021-12-21 DIAGNOSIS — Z00.00 WELLNESS EXAMINATION: ICD-10-CM

## 2021-12-21 PROBLEM — Z87.442 HISTORY OF KIDNEY STONES: Status: RESOLVED | Noted: 2017-07-25 | Resolved: 2021-12-21

## 2021-12-21 PROBLEM — F51.01 PRIMARY INSOMNIA: Status: ACTIVE | Noted: 2021-12-21

## 2021-12-21 PROBLEM — R05.9 COUGH: Status: RESOLVED | Noted: 2020-11-09 | Resolved: 2021-12-21

## 2021-12-21 PROCEDURE — 99214 OFFICE O/P EST MOD 30 MIN: CPT | Performed by: NURSE PRACTITIONER

## 2021-12-21 ASSESSMENT — FIBROSIS 4 INDEX: FIB4 SCORE: 1.31

## 2021-12-21 NOTE — PROGRESS NOTES
"Subjective:     CC:    Chief Complaint   Patient presents with   • Establish Care   • Poor Memory     has gotten worse over the last 2 weeks         HISTORY OF THE PRESENT ILLNESS: Patient is a 71 y.o. female, here today to establish care. Prior PCP was Talia Stoddard. The below problems were discussed/reviewed at this visit:    Problem   Primary Insomnia    Uses melatonin 5mg QHS, able to sleep well with this.      Memory Loss    She is concerned about short-term memory loss; she forgets things she is supposed to do at the moment, recalls later.     Age-Related Osteoporosis Without Current Pathological Fracture    Right hip repair- 2/2021  Left knee replacement around 3/2022; using cane now due to pain  Taking multiple supplements daily including vit D, calcium     Urge Incontinence of Urine    Symptoms managed with ditropan 5mg BID prm; she takes on days when she will be outdoors     Mild Intermittent Asthma Without Complication    Followed by pulm, next appt 12/2021; currently on dulera 2 puffs QAM; prn albuterol, used twice in the past month; no asthma related hospitalizations this year;   Denies SOB, CP, cough, fever     Vitamin D Deficiency    Takes multiple vitamins/supplements daily including D, A, zinc, calcium     Ref. Range 9/9/2021 07:51   25-Hydroxy   Vitamin D 25 Latest Ref Range: 30 - 100 ng/mL 47        Cough (Resolved)   History of Kidney Stones (Resolved)       Health Maintenance: Completed    ROS: per HPI      Objective:     Exam: /70 (BP Location: Left arm, Patient Position: Sitting, BP Cuff Size: Adult)   Pulse 99   Temp 37 °C (98.6 °F) (Temporal)   Resp 18   Ht 1.753 m (5' 9\")   Wt 95.3 kg (210 lb)   SpO2 93%  Body mass index is 31.01 kg/m².    Physical Exam  Constitutional:       Appearance: Normal appearance.   HENT:      Head: Normocephalic and atraumatic.      Mouth/Throat:      Mouth: Mucous membranes are moist.      Pharynx: Oropharynx is clear.   Eyes:      Pupils: Pupils are " equal, round, and reactive to light.   Cardiovascular:      Rate and Rhythm: Normal rate and regular rhythm.      Pulses: Normal pulses.      Heart sounds: Normal heart sounds.   Pulmonary:      Effort: Pulmonary effort is normal.      Breath sounds: Normal breath sounds.   Musculoskeletal:      Cervical back: Normal range of motion and neck supple.      Comments: Left knee tender; ambulating with cane   Skin:     General: Skin is warm and dry.   Neurological:      General: No focal deficit present.      Mental Status: She is alert and oriented to person, place, and time.   Psychiatric:         Mood and Affect: Mood normal.         Behavior: Behavior normal.         Thought Content: Thought content normal.         Judgment: Judgment normal.         Assessment & Plan:   71 y.o. female with the following -    Problem List Items Addressed This Visit     Vitamin D deficiency     - continue daily supplements OTC strength  - check vit level annually         Relevant Orders    VITAMIN D,25 HYDROXY    Mild intermittent asthma without complication     - continue dulera 2 puffs QAM; prn albuterol per pulm         Obesity (BMI 30-39.9)    Relevant Orders    Patient identified as having weight management issue.  Appropriate orders and counseling given.    Age-related osteoporosis without current pathological fracture     Next dexa in 2-3yrs (2022/2023)         Memory loss     - MOCA today 28/30 (prior 26/30 in 2019)  - reassured her; will monitor for any further loss/symptoms; encouraged her to continue brain-active tasks like puzzles           Other Visit Diagnoses     Wellness examination        Relevant Orders    Comp Metabolic Panel    Lipid Profile    Breast cancer screening by mammogram        Relevant Orders    MA-SCREENING MAMMO BILAT W/TOMOSYNTHESIS W/CAD          Return in about 6 months (around 6/21/2022) for Medicare Annual Visit.    Please note that this dictation was created using voice recognition software. I  have made every reasonable attempt to correct obvious errors, but I expect that there are errors of grammar and possibly content that I did not discover before finalizing the note.

## 2021-12-22 NOTE — ASSESSMENT & PLAN NOTE
- MOCA today 28/30 (prior 26/30 in 2019)  - reassured her; will monitor for any further loss/symptoms; encouraged her to continue brain-active tasks like puzzles

## 2022-01-07 ENCOUNTER — OFFICE VISIT (OUTPATIENT)
Dept: URGENT CARE | Facility: PHYSICIAN GROUP | Age: 72
End: 2022-01-07
Payer: MEDICARE

## 2022-01-07 VITALS
SYSTOLIC BLOOD PRESSURE: 130 MMHG | HEART RATE: 75 BPM | DIASTOLIC BLOOD PRESSURE: 92 MMHG | OXYGEN SATURATION: 95 % | TEMPERATURE: 97.7 F | WEIGHT: 202 LBS | HEIGHT: 69 IN | RESPIRATION RATE: 14 BRPM | BODY MASS INDEX: 29.92 KG/M2

## 2022-01-07 DIAGNOSIS — R58 ECCHYMOSIS: ICD-10-CM

## 2022-01-07 PROCEDURE — 99213 OFFICE O/P EST LOW 20 MIN: CPT | Performed by: PHYSICIAN ASSISTANT

## 2022-01-07 ASSESSMENT — ENCOUNTER SYMPTOMS
TINGLING: 0
HEADACHES: 0
DIZZINESS: 0
FALLS: 0
FEVER: 0
CHILLS: 0
BRUISES/BLEEDS EASILY: 0
MYALGIAS: 0
PALPITATIONS: 0
VOMITING: 0
NAUSEA: 0

## 2022-01-07 ASSESSMENT — FIBROSIS 4 INDEX: FIB4 SCORE: 1.31

## 2022-01-07 NOTE — PROGRESS NOTES
Subjective:   Betty Natarajan is a 71 y.o. female who presents for Foot Problem (right. pt states it is black and blue around toes. )      HPI:  This is a very pleasant 77-year-old female presenting to clinic after noticing bruising on her right foot near her toes last night.  She denies any inciting event or trauma.  She did not jam her foot or drop anything on the toes.  She states she is not in any pain.  She has not noticed any surrounding redness or increased warmth. No swelling has been present.  No new or tight fitting shoes.  Recently underwent right total hip replacement 3 months ago and is currently pending left knee replacement.  Does walk with a limp.    Review of Systems   Constitutional: Negative for chills, fever and malaise/fatigue.   Cardiovascular: Negative for palpitations and leg swelling.   Gastrointestinal: Negative for nausea and vomiting.   Musculoskeletal: Negative for falls, joint pain and myalgias.   Skin:        Bruising near the toes of the right foot   Neurological: Negative for dizziness, tingling and headaches.   Endo/Heme/Allergies: Does not bruise/bleed easily.       Medications:    • albuterol Aers  • Melatonin Caps  • Mometasone Furo-Formoterol Fum Aero  • oxybutynin Tabs  • Probiotic Acidophilus BioBeads Caps  • vitamin D Tabs    Allergies: Pcn [penicillins]    Problem List: Betty Natarajan does not have any pertinent problems on file.    Surgical History:  Past Surgical History:   Procedure Laterality Date   • COLONOSCOPY  2/17/2016    internal hemorrhoids   • HEMORRHOIDECTOMY  5/08   • COLONOSCOPY  2005    normal   • GASTRIC BYPASS LAPAROSCOPIC  2001   • NODULE EXCISION      Performed by EDELMIRA MONTE at SURGERY [facility] ORS   • DE REMV 2ND CATARACT,CORN-SCLER SECTN     • TONSILLECTOMY     • US-NEEDLE CORE BX-BREAST PANEL         Past Social Hx: Betty Natarajan  reports that she has never smoked. She has never used smokeless tobacco. She reports current alcohol use. She  "reports that she does not use drugs.     Past Family Hx:  Betty Natarajan family history includes Breast Cancer in her daughter; Cancer in her brother and maternal aunt; Diabetes in her father; Genitourinary () Problems in her father; Heart Attack in her brother; Hypertension in her father; No Known Problems in her brother and brother.     Problem list, medications, and allergies reviewed by myself today in Epic.     Objective:     /92 (BP Location: Left arm, Patient Position: Sitting, BP Cuff Size: Adult)   Pulse 75   Temp 36.5 °C (97.7 °F) (Temporal)   Resp 14   Ht 1.753 m (5' 9\")   Wt 91.6 kg (202 lb)   LMP 05/17/2007   SpO2 95%   BMI 29.83 kg/m²     Physical Exam  Constitutional:       General: She is not in acute distress.     Appearance: Normal appearance. She is not ill-appearing, toxic-appearing or diaphoretic.   HENT:      Head: Normocephalic and atraumatic.   Eyes:      Conjunctiva/sclera: Conjunctivae normal.   Cardiovascular:      Rate and Rhythm: Normal rate and regular rhythm.      Pulses: Normal pulses.      Heart sounds: Normal heart sounds.   Pulmonary:      Effort: Pulmonary effort is normal.   Musculoskeletal:         General: No swelling.      Cervical back: Normal range of motion.        Feet:       Comments: Right foot: There is bruising present proximal to the second through fourth MTP joints.  There is no overlying swelling present.  No surrounding redness or increased warmth.  No tenderness to palpation.  Patient demonstrates full range of motion of all digits.  Full and pain-free ankle range of motion.  Normal gait.  Sensation is intact distally.  All digits are warm.  Capillary refill less than 2 seconds.   Skin:     Capillary Refill: Capillary refill takes less than 2 seconds.      Findings: Bruising present.   Neurological:      General: No focal deficit present.      Mental Status: She is alert and oriented to person, place, and time. Mental status is at baseline. "           Assessment/Plan:     Comments/MDM:     • Patient has bruising proximal to the second through fourth MTP joints of her right foot.  There is no swelling present.  No tenderness to palpation over this area.  Denies any injury or trauma.  No signs of cellulitic change.  Capillary refill distally is less than 2 seconds.  All digits are warm.  Likely broken vessel leading to bruising.  Elevation encouraged at this time.  Monitor for any change in symptoms.  Call with any questions or concerns.     Diagnosis and associated orders:     1. Ecchymosis              Differential diagnosis, natural history, supportive care, and indications for immediate follow-up discussed.    I personally reviewed prior external notes and test results pertinent to today's visit.     Advised the patient to follow-up with the primary care physician for recheck, reevaluation, and consideration of further management.    Please note that this dictation was created using voice recognition software. I have made reasonable attempt to correct obvious errors, but I expect that there are errors of grammar and possibly content that I did not discover before finalizing the note.    This note was electronically signed by NICOLETTE Green PA-C

## 2022-01-12 ENCOUNTER — PATIENT MESSAGE (OUTPATIENT)
Dept: MEDICAL GROUP | Facility: PHYSICIAN GROUP | Age: 72
End: 2022-01-12

## 2022-01-12 NOTE — PATIENT COMMUNICATION
I spoke to the patient in regards to her request for another COVID test.  The patient reports that she had a positive home test on 1/6/2022.  She subsequently bought hydroxychloroquine in [place].  I explained the new guidelines on quarantine and the criteria for ending quarantine.  I also explained that depending on the test, she can remain positive for six weeks or greater  without being contagious. The patient had no further questions.

## 2022-02-22 ENCOUNTER — OFFICE VISIT (OUTPATIENT)
Dept: MEDICAL GROUP | Facility: PHYSICIAN GROUP | Age: 72
End: 2022-02-22
Payer: MEDICARE

## 2022-02-22 ENCOUNTER — HOSPITAL ENCOUNTER (OUTPATIENT)
Dept: LAB | Facility: MEDICAL CENTER | Age: 72
End: 2022-02-22
Attending: NURSE PRACTITIONER
Payer: MEDICARE

## 2022-02-22 VITALS
HEIGHT: 69 IN | OXYGEN SATURATION: 99 % | HEART RATE: 88 BPM | BODY MASS INDEX: 29.92 KG/M2 | RESPIRATION RATE: 16 BRPM | TEMPERATURE: 97.4 F | WEIGHT: 202 LBS | DIASTOLIC BLOOD PRESSURE: 82 MMHG | SYSTOLIC BLOOD PRESSURE: 138 MMHG

## 2022-02-22 DIAGNOSIS — N39.41 URGE INCONTINENCE OF URINE: ICD-10-CM

## 2022-02-22 DIAGNOSIS — Z01.818 PRE-OP EVALUATION: ICD-10-CM

## 2022-02-22 DIAGNOSIS — Z01.818 PREOP EXAMINATION: ICD-10-CM

## 2022-02-22 LAB
ANION GAP SERPL CALC-SCNC: 10 MMOL/L (ref 7–16)
BUN SERPL-MCNC: 14 MG/DL (ref 8–22)
CALCIUM SERPL-MCNC: 9.3 MG/DL (ref 8.5–10.5)
CHLORIDE SERPL-SCNC: 106 MMOL/L (ref 96–112)
CO2 SERPL-SCNC: 24 MMOL/L (ref 20–33)
CREAT SERPL-MCNC: 0.66 MG/DL (ref 0.5–1.4)
ERYTHROCYTE [DISTWIDTH] IN BLOOD BY AUTOMATED COUNT: 52.3 FL (ref 35.9–50)
GLUCOSE SERPL-MCNC: 88 MG/DL (ref 65–99)
HCT VFR BLD AUTO: 39.4 % (ref 37–47)
HGB BLD-MCNC: 12.6 G/DL (ref 12–16)
MCH RBC QN AUTO: 28.6 PG (ref 27–33)
MCHC RBC AUTO-ENTMCNC: 32 G/DL (ref 33.6–35)
MCV RBC AUTO: 89.5 FL (ref 81.4–97.8)
PLATELET # BLD AUTO: 316 K/UL (ref 164–446)
PMV BLD AUTO: 10.7 FL (ref 9–12.9)
POTASSIUM SERPL-SCNC: 4.3 MMOL/L (ref 3.6–5.5)
RBC # BLD AUTO: 4.4 M/UL (ref 4.2–5.4)
SODIUM SERPL-SCNC: 140 MMOL/L (ref 135–145)
WBC # BLD AUTO: 6.9 K/UL (ref 4.8–10.8)

## 2022-02-22 PROCEDURE — 36415 COLL VENOUS BLD VENIPUNCTURE: CPT

## 2022-02-22 PROCEDURE — 80048 BASIC METABOLIC PNL TOTAL CA: CPT

## 2022-02-22 PROCEDURE — 99213 OFFICE O/P EST LOW 20 MIN: CPT | Performed by: NURSE PRACTITIONER

## 2022-02-22 PROCEDURE — 85027 COMPLETE CBC AUTOMATED: CPT

## 2022-02-22 RX ORDER — ACETAMINOPHEN 325 MG/1
1 TABLET ORAL
COMMUNITY

## 2022-02-22 RX ORDER — ACETAMINOPHEN 500 MG
1 TABLET ORAL
COMMUNITY
End: 2023-06-09

## 2022-02-22 RX ORDER — OXYBUTYNIN CHLORIDE 5 MG/1
5 TABLET ORAL 2 TIMES DAILY PRN
Qty: 180 TABLET | Refills: 3 | Status: SHIPPED | OUTPATIENT
Start: 2022-02-22 | End: 2023-09-17 | Stop reason: SDUPTHER

## 2022-02-22 ASSESSMENT — FIBROSIS 4 INDEX: FIB4 SCORE: 1.31

## 2022-02-22 NOTE — LETTER
PROCEDURE/SURGERY CLEARANCE FORM      Encounter Date: 2/22/2022    Patient: Betty Natarajan  YOB: 1950    PCP:  Chiara Alvarez D.N.P.    REFERRING DOCTOR:  Dr Almeida/[facility]    PATIENT does not have  PPM.    PATIENT does not have  AICD.    The above patient is cleared to have the following procedure/surgery: left knee replacement                                           Additional comments: history of asthma controlled on Dulera 2puffs daily.                 PCP Signature   Chiara Alvarez D.N.P.

## 2022-02-22 NOTE — PROGRESS NOTES
Subjective:     CC:   Chief Complaint   Patient presents with   • Follow-Up     Surgical clearance knee replacement appointment 03/01 // refill oxybutynin        HPI:   Patient is a 71 y.o. female here today for surgical clearance on upcoming left knee replacement (3/1/2022) with [place]; they are requesting BMP, CBC, EKG. She has history of asthma controlled on daily dulexa; no cardiac history.        Problem   Preop Examination   Urge Incontinence of Urine    Symptoms managed with ditropan 5mg BID prm; she takes on days when she will be outdoors          Past Medical History:   Diagnosis Date   • Asthma    • Bronchitis    • Depression    • History of kidney stones 7/25/2017   • Influenza    • Pap smear    • Positive PPD    • Tonsillitis         Past Surgical History:   Procedure Laterality Date   • COLONOSCOPY  2/17/2016    internal hemorrhoids   • HEMORRHOIDECTOMY  5/08   • COLONOSCOPY  2005    normal   • GASTRIC BYPASS LAPAROSCOPIC  2001   • NODULE EXCISION      Performed by EDELMIRA MONTE at SURGERY [facility] ORS   • [place] REMV 2ND CATARACT,CORN-SCLER SECTN     • TONSILLECTOMY     • US-NEEDLE CORE BX-BREAST PANEL          Current Outpatient Medications on File Prior to Visit   Medication Sig Dispense Refill   • acetaminophen (TYLENOL) 325 MG Tab Take 1 Tablet by mouth.     • albuterol 108 (90 Base) MCG/ACT Aero Soln inhalation aerosol Inhale 2 Puffs by mouth every 6 hours as needed for Shortness of Breath.     • Melatonin 5 MG Cap Take  by mouth at bedtime as needed.     • Probiotic Product (PROBIOTIC ACIDOPHILUS) Cap Take  by mouth every day.     • Mometasone Furo-Formoterol Fum (DULERA) 200-5 MCG/ACT Aerosol Inhale  by mouth.     • vitamin D (CHOLECALCIFEROL) 1000 UNIT TABS Take 1,000 Units by mouth every day.     • Calcium Citrate-Vitamin D 315-250 MG-UNIT Tab Take 1 Tablet by mouth 2 times a day.     • Probiotic, Lactobacillus, Cap Take 1 Capsule by mouth.     • Multiple Vitamin (MULTI-VITAMIN) Tab Take 1  "Tablet by mouth every day.     • Zinc Acetate, Oral, 25 MG Cap Take 1 Capsule by mouth every day.       No current facility-administered medications on file prior to visit.      ROS: No CP, palpitations, dizziness, cough, fever      Objective:     Exam:  /82   Pulse 88   Temp 36.3 °C (97.4 °F)   Resp 16   Ht 1.753 m (5' 9\")   Wt 91.6 kg (202 lb)   LMP 05/17/2007   SpO2 99%   BMI 29.83 kg/m²  Body mass index is 29.83 kg/m².    Physical Exam  Constitutional:       Appearance: Normal appearance.   Cardiovascular:      Rate and Rhythm: Normal rate and regular rhythm.      Pulses: Normal pulses.      Heart sounds: Normal heart sounds.   Pulmonary:      Effort: Pulmonary effort is normal.      Breath sounds: Normal breath sounds.   Musculoskeletal:         General: Normal range of motion.      Cervical back: Normal range of motion and neck supple.      Comments: Ambulating with cane   Skin:     General: Skin is warm and dry.   Neurological:      General: No focal deficit present.      Mental Status: She is alert and oriented to person, place, and time.       Assessment & Plan:     71 y.o. female with the following -     Problem List Items Addressed This Visit     Urge incontinence of urine     Symptoms managed with ditropan 5mg BID prn; she takes on days when she will be outdoors  - Requesting refill today         Relevant Medications    oxybutynin (DITROPAN) 5 MG Tab    Preop examination     Here today for pre-op clearance for upcoming left knee replacement on 3/1/2022  - EKG today: NSR, no ST changes or acute abnormalities noted  - BMP, CBC drawn today at lab; will review results & fax clearance letter to [facility]            Other Visit Diagnoses     Pre-op evaluation        Relevant Orders    Basic Metabolic Panel    CBC WITHOUT DIFFERENTIAL          Educated in proper administration of medication(s) ordered today including safety, possible SE, risks, benefits, rationale and alternatives to therapy. "     Return with questions/concerns.    Please note that this dictation was created using voice recognition software. I have made every reasonable attempt to correct obvious errors, but I expect that there are errors of grammar and possibly content that I did not discover before finalizing the note.

## 2022-02-22 NOTE — LETTER
PROCEDURE/SURGERY CLEARANCE FORM      Encounter Date: 2/22/2022    Patient: Betty Natarajan  YOB: 1950    PCP:  Chiara Alvarez D.N.P.    REFERRING DOCTOR:  Dr Almeida/[facility]    PATIENT does not have  PPM.    PATIENT does not have  AICD.    The above patient is cleared to have the following procedure/surgery: right knee replacement                                           Additional comments: history of Asthma controlled on daily Dulera                  PCP Signature   Chiara Alvarez D.N.P.

## 2022-02-22 NOTE — LETTER
PROCEDURE/SURGERY CLEARANCE FORM      Encounter Date: 2/22/2022    Patient: Betty Natarajan  YOB: 1950    PCP:  Chiara Alvarez D.N.P.    REFERRING DOCTOR:  Dr Monsivais/[facility]    PATIENT does not have  PPM.    PATIENT does not have  AICD.    The above patient is cleared to have the following procedure/surgery: left knee replacement                                           Additional comments: history of asthma controlled on Dulera 2puffs Daily                 PCP Signature   Chiara Alvarez D.N.P.

## 2022-02-23 NOTE — ASSESSMENT & PLAN NOTE
Symptoms managed with ditropan 5mg BID prn; she takes on days when she will be outdoors  - Requesting refill today

## 2022-02-23 NOTE — ASSESSMENT & PLAN NOTE
Here today for pre-op clearance for upcoming left knee replacement on 3/1/2022  - EKG today: NSR, no ST changes or acute abnormalities noted  - BMP, CBC drawn today at lab; will review results & fax clearance letter to [facility]

## 2022-06-17 ENCOUNTER — HOSPITAL ENCOUNTER (OUTPATIENT)
Dept: LAB | Facility: MEDICAL CENTER | Age: 72
End: 2022-06-17
Attending: NURSE PRACTITIONER
Payer: MEDICARE

## 2022-06-17 DIAGNOSIS — Z00.00 WELLNESS EXAMINATION: ICD-10-CM

## 2022-06-17 DIAGNOSIS — E55.9 VITAMIN D DEFICIENCY: ICD-10-CM

## 2022-06-17 LAB
25(OH)D3 SERPL-MCNC: 44 NG/ML (ref 30–100)
ALBUMIN SERPL BCP-MCNC: 4.5 G/DL (ref 3.2–4.9)
ALBUMIN/GLOB SERPL: 2 G/DL
ALP SERPL-CCNC: 100 U/L (ref 30–99)
ALT SERPL-CCNC: 13 U/L (ref 2–50)
ANION GAP SERPL CALC-SCNC: 12 MMOL/L (ref 7–16)
AST SERPL-CCNC: 16 U/L (ref 12–45)
BILIRUB SERPL-MCNC: 0.7 MG/DL (ref 0.1–1.5)
BUN SERPL-MCNC: 12 MG/DL (ref 8–22)
CALCIUM SERPL-MCNC: 9.8 MG/DL (ref 8.5–10.5)
CHLORIDE SERPL-SCNC: 105 MMOL/L (ref 96–112)
CHOLEST SERPL-MCNC: 145 MG/DL (ref 100–199)
CO2 SERPL-SCNC: 25 MMOL/L (ref 20–33)
CREAT SERPL-MCNC: 0.57 MG/DL (ref 0.5–1.4)
FASTING STATUS PATIENT QL REPORTED: NORMAL
GFR SERPLBLD CREATININE-BSD FMLA CKD-EPI: 96 ML/MIN/1.73 M 2
GLOBULIN SER CALC-MCNC: 2.3 G/DL (ref 1.9–3.5)
GLUCOSE SERPL-MCNC: 97 MG/DL (ref 65–99)
HDLC SERPL-MCNC: 74 MG/DL
LDLC SERPL CALC-MCNC: 54 MG/DL
POTASSIUM SERPL-SCNC: 4.3 MMOL/L (ref 3.6–5.5)
PROT SERPL-MCNC: 6.8 G/DL (ref 6–8.2)
SODIUM SERPL-SCNC: 142 MMOL/L (ref 135–145)
TRIGL SERPL-MCNC: 84 MG/DL (ref 0–149)

## 2022-06-17 PROCEDURE — 80061 LIPID PANEL: CPT | Mod: GA

## 2022-06-17 PROCEDURE — 82306 VITAMIN D 25 HYDROXY: CPT

## 2022-06-17 PROCEDURE — 36415 COLL VENOUS BLD VENIPUNCTURE: CPT

## 2022-06-17 PROCEDURE — 80053 COMPREHEN METABOLIC PANEL: CPT

## 2022-06-20 SDOH — ECONOMIC STABILITY: TRANSPORTATION INSECURITY
IN THE PAST 12 MONTHS, HAS THE LACK OF TRANSPORTATION KEPT YOU FROM MEDICAL APPOINTMENTS OR FROM GETTING MEDICATIONS?: NO

## 2022-06-20 SDOH — ECONOMIC STABILITY: INCOME INSECURITY: IN THE LAST 12 MONTHS, WAS THERE A TIME WHEN YOU WERE NOT ABLE TO PAY THE MORTGAGE OR RENT ON TIME?: NO

## 2022-06-20 SDOH — ECONOMIC STABILITY: FOOD INSECURITY: WITHIN THE PAST 12 MONTHS, YOU WORRIED THAT YOUR FOOD WOULD RUN OUT BEFORE YOU GOT MONEY TO BUY MORE.: NEVER TRUE

## 2022-06-20 SDOH — ECONOMIC STABILITY: HOUSING INSECURITY
IN THE LAST 12 MONTHS, WAS THERE A TIME WHEN YOU DID NOT HAVE A STEADY PLACE TO SLEEP OR SLEPT IN A SHELTER (INCLUDING NOW)?: NO

## 2022-06-20 SDOH — HEALTH STABILITY: PHYSICAL HEALTH: ON AVERAGE, HOW MANY DAYS PER WEEK DO YOU ENGAGE IN MODERATE TO STRENUOUS EXERCISE (LIKE A BRISK WALK)?: 2 DAYS

## 2022-06-20 SDOH — ECONOMIC STABILITY: FOOD INSECURITY: WITHIN THE PAST 12 MONTHS, THE FOOD YOU BOUGHT JUST DIDN'T LAST AND YOU DIDN'T HAVE MONEY TO GET MORE.: NEVER TRUE

## 2022-06-20 SDOH — HEALTH STABILITY: MENTAL HEALTH
STRESS IS WHEN SOMEONE FEELS TENSE, NERVOUS, ANXIOUS, OR CAN'T SLEEP AT NIGHT BECAUSE THEIR MIND IS TROUBLED. HOW STRESSED ARE YOU?: ONLY A LITTLE

## 2022-06-20 SDOH — ECONOMIC STABILITY: HOUSING INSECURITY: IN THE LAST 12 MONTHS, HOW MANY PLACES HAVE YOU LIVED?: 1

## 2022-06-20 SDOH — ECONOMIC STABILITY: TRANSPORTATION INSECURITY
IN THE PAST 12 MONTHS, HAS LACK OF RELIABLE TRANSPORTATION KEPT YOU FROM MEDICAL APPOINTMENTS, MEETINGS, WORK OR FROM GETTING THINGS NEEDED FOR DAILY LIVING?: NO

## 2022-06-20 SDOH — ECONOMIC STABILITY: INCOME INSECURITY: HOW HARD IS IT FOR YOU TO PAY FOR THE VERY BASICS LIKE FOOD, HOUSING, MEDICAL CARE, AND HEATING?: NOT HARD AT ALL

## 2022-06-20 SDOH — HEALTH STABILITY: PHYSICAL HEALTH: ON AVERAGE, HOW MANY MINUTES DO YOU ENGAGE IN EXERCISE AT THIS LEVEL?: 20 MIN

## 2022-06-20 SDOH — ECONOMIC STABILITY: TRANSPORTATION INSECURITY
IN THE PAST 12 MONTHS, HAS LACK OF TRANSPORTATION KEPT YOU FROM MEETINGS, WORK, OR FROM GETTING THINGS NEEDED FOR DAILY LIVING?: NO

## 2022-06-20 ASSESSMENT — SOCIAL DETERMINANTS OF HEALTH (SDOH)
HOW OFTEN DO YOU HAVE A DRINK CONTAINING ALCOHOL: 2-3 TIMES A WEEK
HOW OFTEN DO YOU ATTEND CHURCH OR RELIGIOUS SERVICES?: 1 TO 4 TIMES PER YEAR
HOW MANY DRINKS CONTAINING ALCOHOL DO YOU HAVE ON A TYPICAL DAY WHEN YOU ARE DRINKING: 1 OR 2
WITHIN THE PAST 12 MONTHS, YOU WORRIED THAT YOUR FOOD WOULD RUN OUT BEFORE YOU GOT THE MONEY TO BUY MORE: NEVER TRUE
HOW OFTEN DO YOU HAVE SIX OR MORE DRINKS ON ONE OCCASION: NEVER
HOW OFTEN DO YOU GET TOGETHER WITH FRIENDS OR RELATIVES?: MORE THAN THREE TIMES A WEEK
IN A TYPICAL WEEK, HOW MANY TIMES DO YOU TALK ON THE PHONE WITH FAMILY, FRIENDS, OR NEIGHBORS?: MORE THAN THREE TIMES A WEEK
HOW OFTEN DO YOU ATTEND CHURCH OR RELIGIOUS SERVICES?: 1 TO 4 TIMES PER YEAR
HOW OFTEN DO YOU ATTENT MEETINGS OF THE CLUB OR ORGANIZATION YOU BELONG TO?: MORE THAN 4 TIMES PER YEAR
HOW OFTEN DO YOU GET TOGETHER WITH FRIENDS OR RELATIVES?: MORE THAN THREE TIMES A WEEK
HOW OFTEN DO YOU ATTENT MEETINGS OF THE CLUB OR ORGANIZATION YOU BELONG TO?: MORE THAN 4 TIMES PER YEAR
IN A TYPICAL WEEK, HOW MANY TIMES DO YOU TALK ON THE PHONE WITH FAMILY, FRIENDS, OR NEIGHBORS?: MORE THAN THREE TIMES A WEEK
DO YOU BELONG TO ANY CLUBS OR ORGANIZATIONS SUCH AS CHURCH GROUPS UNIONS, FRATERNAL OR ATHLETIC GROUPS, OR SCHOOL GROUPS?: YES
HOW HARD IS IT FOR YOU TO PAY FOR THE VERY BASICS LIKE FOOD, HOUSING, MEDICAL CARE, AND HEATING?: NOT HARD AT ALL
DO YOU BELONG TO ANY CLUBS OR ORGANIZATIONS SUCH AS CHURCH GROUPS UNIONS, FRATERNAL OR ATHLETIC GROUPS, OR SCHOOL GROUPS?: YES

## 2022-06-20 ASSESSMENT — LIFESTYLE VARIABLES
AUDIT-C TOTAL SCORE: 3
HOW MANY STANDARD DRINKS CONTAINING ALCOHOL DO YOU HAVE ON A TYPICAL DAY: 1 OR 2
SKIP TO QUESTIONS 9-10: 1
HOW OFTEN DO YOU HAVE SIX OR MORE DRINKS ON ONE OCCASION: NEVER
HOW OFTEN DO YOU HAVE A DRINK CONTAINING ALCOHOL: 2-3 TIMES A WEEK

## 2022-06-23 ENCOUNTER — OFFICE VISIT (OUTPATIENT)
Dept: MEDICAL GROUP | Facility: PHYSICIAN GROUP | Age: 72
End: 2022-06-23
Payer: MEDICARE

## 2022-06-23 VITALS
DIASTOLIC BLOOD PRESSURE: 92 MMHG | BODY MASS INDEX: 30.21 KG/M2 | TEMPERATURE: 97.5 F | SYSTOLIC BLOOD PRESSURE: 122 MMHG | HEIGHT: 69 IN | WEIGHT: 204 LBS | RESPIRATION RATE: 17 BRPM | HEART RATE: 86 BPM | OXYGEN SATURATION: 95 %

## 2022-06-23 DIAGNOSIS — Z00.00 ENCOUNTER FOR ANNUAL WELLNESS VISIT (AWV) IN MEDICARE PATIENT: ICD-10-CM

## 2022-06-23 PROBLEM — U07.1 COVID-19: Status: ACTIVE | Noted: 2022-01-04

## 2022-06-23 PROCEDURE — G0439 PPPS, SUBSEQ VISIT: HCPCS | Performed by: NURSE PRACTITIONER

## 2022-06-23 RX ORDER — TRAMADOL HYDROCHLORIDE 50 MG/1
1 TABLET ORAL
COMMUNITY
Start: 2022-02-24 | End: 2023-06-09

## 2022-06-23 RX ORDER — OXYCODONE HYDROCHLORIDE 5 MG/1
TABLET ORAL
COMMUNITY
Start: 2022-03-23 | End: 2023-06-09

## 2022-06-23 ASSESSMENT — PATIENT HEALTH QUESTIONNAIRE - PHQ9: CLINICAL INTERPRETATION OF PHQ2 SCORE: 0

## 2022-06-23 ASSESSMENT — ENCOUNTER SYMPTOMS: GENERAL WELL-BEING: GOOD

## 2022-06-23 ASSESSMENT — FIBROSIS 4 INDEX: FIB4 SCORE: 1.01

## 2022-06-23 ASSESSMENT — ACTIVITIES OF DAILY LIVING (ADL): BATHING_REQUIRES_ASSISTANCE: 0

## 2022-06-23 NOTE — PROGRESS NOTES
Chief Complaint   Patient presents with   • Annual Wellness Visit       HPI:  Betty Natarajan is a 71 y.o. here for Medicare Annual Wellness Visit     Patient Active Problem List    Diagnosis Date Noted   • COVID-19 01/04/2022   • Primary insomnia 12/21/2021   • Preop examination 09/07/2021   • Chronic pain of right hip 09/07/2021   • Gastroesophageal reflux disease without esophagitis 05/25/2021   • Memory loss 07/31/2019   • Age-related osteoporosis without current pathological fracture 02/02/2018   • Urge incontinence of urine 07/25/2017   • STEVEN (obstructive sleep apnea) 05/05/2017   • Obesity (BMI 30-39.9) 12/14/2016   • Mild intermittent asthma without complication 02/01/2013   • Vitamin D deficiency 08/04/2010     Current Outpatient Medications   Medication Sig Dispense Refill   • oxyCODONE immediate-release (ROXICODONE) 5 MG Tab oxycodone 5 mg tablet   1-2 tabs po q6hrs prn pain x 5 days     • traMADol (ULTRAM) 50 MG Tab Take 1 Tablet by mouth.     • acetaminophen (TYLENOL) 325 MG Tab Take 1 Tablet by mouth.     • Calcium Citrate-Vitamin D 315-250 MG-UNIT Tab Take 1 Tablet by mouth 2 times a day.     • Probiotic, Lactobacillus, Cap Take 1 Capsule by mouth.     • Multiple Vitamin (MULTI-VITAMIN) Tab Take 1 Tablet by mouth every day.     • Zinc Acetate, Oral, 25 MG Cap Take 1 Capsule by mouth every day.     • oxybutynin (DITROPAN) 5 MG Tab Take 1 Tablet by mouth 2 times a day as needed. 180 Tablet 3   • albuterol 108 (90 Base) MCG/ACT Aero Soln inhalation aerosol Inhale 2 Puffs by mouth every 6 hours as needed for Shortness of Breath.     • Melatonin 5 MG Cap Take  by mouth at bedtime as needed.     • Mometasone Furo-Formoterol Fum (DULERA) 200-5 MCG/ACT Aerosol Inhale  by mouth.     • vitamin D (CHOLECALCIFEROL) 1000 UNIT TABS Take 1,000 Units by mouth every day.     • vitamin D3 (CHOLECALCIFEROL) 5000 Unit (125 mcg) Tab Take 1 Tablet by mouth every day.     • Probiotic Product (PROBIOTIC ACIDOPHILUS) Cap  Take  by mouth every day. (Patient not taking: Reported on 6/23/2022)       No current facility-administered medications for this visit.          Current supplements as per medication list.     Allergies: Pcn [penicillins]    Current social contact/activities:    She  reports that she has never smoked. She has never used smokeless tobacco. She reports current alcohol use. She reports that she does not use drugs.  Counseling given: Not Answered      DPA/Advanced Directive:  Patient has Advanced Directive on file.     ROS:    Gait: Uses no assistive device  Ostomy: No  Other tubes: No  Amputations: No  Chronic oxygen use: No  Last eye exam: 2022  Wears hearing aids: No   : Denies any urinary leakage during the last 6 months    Screening:    Depression Screening  Little interest or pleasure in doing things?  0 - not at all  Feeling down, depressed , or hopeless? 0 - not at all  Patient Health Questionnaire Score: 0     If depressive symptoms identified deferred to follow up visit unless specifically addressed in assessment and plan.    Interpretation of PHQ-9 Total Score   Score Severity   1-4 No Depression   5-9 Mild Depression   10-14 Moderate Depression   15-19 Moderately Severe Depression   20-27 Severe Depression    Screening for Cognitive Impairment  Three Minute Recall (daughter, heaven, mountain) 2/3    Jeison clock face with all 12 numbers and set the hands to show 10 past 11.  Yes    Cognitive concerns identified deferred for follow up unless specifically addressed in assessment and plan.    Fall Risk Assessment  Has the patient had two or more falls in the last year or any fall with injury in the last year?  No    Safety Assessment  Throw rugs on floor.  No  Handrails on all stairs.  Yes  Good lighting in all hallways.  Yes  Difficulty hearing.  No  Patient counseled about all safety risks that were identified.    Functional Assessment ADLs  Are there any barriers preventing you from cooking for yourself or  meeting nutritional needs?  No.    Are there any barriers preventing you from driving safely or obtaining transportation?  No.    Are there any barriers preventing you from using a telephone or calling for help?  No.    Are there any barriers preventing you from shopping?  No.    Are there any barriers preventing you from taking care of your own finances?  No.    Are there any barriers preventing you from managing your medications?  No.    Are there any barriers preventing you from showering, bathing or dressing yourself?  No.    Are you currently engaging in any exercise or physical activity?  Yes.     What is your perception of your health?  Good.      Health Maintenance Summary          Ordered - MAMMOGRAM (Yearly) Ordered on 12/21/2021 07/29/2020  MA-SCREENING MAMMO BILAT W/TOMOSYNTHESIS W/CAD    04/11/2019  MA-SCREEN MAMMO W/CAD-BILAT    01/18/2018  MA-MAMMO SCREENING BILAT W/YAZMIN W/CAD    01/16/2017  MA-MAMMO SCREENING BILAT W/YAZMIN W/CAD    01/06/2016  MA-SCREEN MAMMO W/CAD-BILAT    Only the first 5 history entries have been loaded, but more history exists.          Postponed - IMM INFLUENZA (Season Ended) Postponed until 12/21/2022    10/05/2020  Imm Admin: Influenza Vaccine Adult HD    11/03/2019  Imm Admin: Influenza Vaccine Adult HD    11/03/2019  Imm Admin: Influenza, Unspecified - HISTORICAL DATA    10/09/2018  Imm Admin: Influenza Vaccine Adult HD    09/27/2017  Imm Admin: Influenza Vaccine Adult HD    Only the first 5 history entries have been loaded, but more history exists.          Postponed - IMM DTaP/Tdap/Td Vaccine (2 - Td or Tdap) Postponed until 12/21/2022 08/17/2011  Imm Admin: Tdap Vaccine    10/15/2006  Imm Admin: TD Vaccine          Postponed - COVID-19 Vaccine (1) Postponed until 6/23/2023    No completion history exists for this topic.          Annual Wellness Visit (Every 366 Days) Next due on 6/24/2023 06/23/2022  Level of Service: ANNUAL WELLNESS VISIT-INCLUDES PPPS SUBSEQUE*     12/15/2015  Visit Dx: Medicare annual wellness visit, initial          BONE DENSITY (Every 2 Years) Next due on 7/28/2023 07/29/2020  DS-BONE DENSITY STUDY (DEXA)    02/01/2018  Done    02/01/2018  DS-BONE DENSITY STUDY (DEXA)    01/16/2017  DS-BONE DENSITY STUDY (DEXA)    01/06/2016  DS-BONE DENSITY STUDY (DEXA)    Only the first 5 history entries have been loaded, but more history exists.          COLORECTAL CANCER SCREENING (COLONOSCOPY - Preferred) Next due on 7/20/2031 07/20/2021  REFERRAL TO GI FOR COLONOSCOPY    07/20/2021  REFERRAL TO GI FOR COLONOSCOPY    02/17/2016  AMB REFERRAL TO GI FOR COLONOSCOPY    02/04/2009  OCCULT BLOOD X3 (STOOL)          IMM PNEUMOCOCCAL VACCINE: 65+ Years (Series Information) Completed    12/15/2015  Imm Admin: Pneumococcal Conjugate Vaccine (Prevnar/PCV-13)    10/28/2014  Imm Admin: Pneumococcal polysaccharide vaccine (PPSV-23)    10/28/2014  Imm Admin: Pneumococcal Conjugate Vaccine (Prevnar/PCV-13)    12/21/2009  Imm Admin: Pneumococcal Vaccine (UF) - HISTORICAL DATA          HEPATITIS C SCREENING  Completed    06/29/2017  HEP C VIRUS ANTIBODY          IMM ZOSTER VACCINES (Series Information) Completed    11/01/2019  Imm Admin: Zoster Vaccine Recombinant (RZV) (SHINGRIX)    10/28/2014  Imm Admin: Zoster Vaccine Live (ZVL) (Zostavax) - HISTORICAL DATA    11/01/2009  Imm Admin: Zoster Vaccine Recombinant (RZV) (SHINGRIX)          IMM HEP B VACCINE (Series Information) Aged Out    No completion history exists for this topic.          IMM MENINGOCOCCAL VACCINE (MCV4) (Series Information) Aged Out    No completion history exists for this topic.          Discontinued - PAP SMEAR  Discontinued    07/25/2017  PATHOLOGY GYN SPECIMEN    10/28/2014  THINPREP PAP WITH HPV    10/28/2014  PATHOLOGY GYN SPECIMEN    09/19/2013  PAP IG, RFX HPV ALL PTH    11/29/2012  PAP IG, RFX HPV ALL PTH    Only the first 5 history entries have been loaded, but more history exists.           "    Patient Care Team:  Chiara Alvarez D.N.P. as PCP - General (Nurse Practitioner Family)  Peter Nguyen M.D. as Consulting Physician (Pediatric Pulmonology)    Social History     Tobacco Use   • Smoking status: Never Smoker   • Smokeless tobacco: Never Used   Vaping Use   • Vaping Use: Never used   Substance Use Topics   • Alcohol use: Yes     Alcohol/week: 0.0 oz     Comment: occasional   • Drug use: No     Family History   Problem Relation Age of Onset   • Diabetes Father    • Hypertension Father    • Genitourinary () Problems Father         renal failure on dialysis   • Cancer Brother         brain tumor   • Heart Attack Brother    • No Known Problems Brother    • No Known Problems Brother    • Breast Cancer Maternal Aunt    • Cancer Maternal Aunt    • Breast Cancer Daughter    • Sleep Apnea Neg Hx      She  has a past medical history of Asthma, Bronchitis, Depression, History of kidney stones (7/25/2017), Influenza, Pap smear, Positive PPD, and Tonsillitis.   Past Surgical History:   Procedure Laterality Date   • COLONOSCOPY  2/17/2016    internal hemorrhoids   • HEMORRHOIDECTOMY  5/08   • COLONOSCOPY  2005    normal   • GASTRIC BYPASS LAPAROSCOPIC  2001   • NODULE EXCISION      Performed by EDELMIRA MONTE at SURGERY [facility] ORS   • AK REMV 2ND CATARACT,CORN-SCLER SECTN     • TONSILLECTOMY     • US-NEEDLE CORE BX-BREAST PANEL       Exam:   BP (!) 122/92 (BP Location: Left arm, Patient Position: Sitting, BP Cuff Size: Adult)   Pulse 86   Temp 36.4 °C (97.5 °F) (Temporal)   Resp 17   Ht 1.753 m (5' 9\")   Wt 92.5 kg (204 lb)   SpO2 95%  Body mass index is 30.13 kg/m².    Hearing good.    Dentition good  Alert, oriented in no acute distress.  Eye contact is good, speech goal directed, affect calm    Assessment and Plan. The following treatment and monitoring plan is recommended:    1. Encounter for annual wellness visit (AWV) in Medicare patient    Services suggested: No services needed at this " time  Health Care Screening: Age-appropriate preventive services recommended by USPTF and ACIP covered by Medicare were discussed today. Services ordered if indicated and agreed upon by the patient.  Referrals offered: Community-based lifestyle interventions to reduce health risks and promote self-management and wellness, fall prevention, nutrition, physical activity, tobacco-use cessation, weight loss, and mental health services as per orders if indicated.    Discussion today about general wellness and lifestyle habits:    · Prevent falls and reduce trip hazards; Cautioned about securing or removing rugs.  · Have a working fire alarm and carbon monoxide detector;   · Engage in regular physical activity and social activities     Follow-up: Return in about 1 year (around 6/23/2023) for Annual Visit.

## 2022-08-18 ENCOUNTER — PATIENT MESSAGE (OUTPATIENT)
Dept: MEDICAL GROUP | Facility: PHYSICIAN GROUP | Age: 72
End: 2022-08-18
Payer: COMMERCIAL

## 2022-08-18 DIAGNOSIS — L91.8 SKIN TAG: ICD-10-CM

## 2022-08-20 NOTE — PROGRESS NOTES
1. Skin tag  Patient requesting derm consult for skin tags, red blotches and dry patches scattered on skin.  - Referral to Dermatology

## 2022-10-14 ENCOUNTER — HOSPITAL ENCOUNTER (OUTPATIENT)
Dept: RADIOLOGY | Facility: MEDICAL CENTER | Age: 72
End: 2022-10-14
Attending: NURSE PRACTITIONER
Payer: MEDICARE

## 2022-10-14 DIAGNOSIS — Z12.31 BREAST CANCER SCREENING BY MAMMOGRAM: ICD-10-CM

## 2022-10-14 PROCEDURE — 77063 BREAST TOMOSYNTHESIS BI: CPT

## 2022-11-03 ENCOUNTER — PATIENT MESSAGE (OUTPATIENT)
Dept: HEALTH INFORMATION MANAGEMENT | Facility: OTHER | Age: 72
End: 2022-11-03

## 2023-06-09 ENCOUNTER — OFFICE VISIT (OUTPATIENT)
Dept: MEDICAL GROUP | Facility: PHYSICIAN GROUP | Age: 73
End: 2023-06-09
Payer: MEDICARE

## 2023-06-09 VITALS
DIASTOLIC BLOOD PRESSURE: 82 MMHG | HEIGHT: 69 IN | WEIGHT: 208 LBS | OXYGEN SATURATION: 99 % | SYSTOLIC BLOOD PRESSURE: 142 MMHG | TEMPERATURE: 98.2 F | HEART RATE: 95 BPM | BODY MASS INDEX: 30.81 KG/M2

## 2023-06-09 DIAGNOSIS — F51.01 PRIMARY INSOMNIA: ICD-10-CM

## 2023-06-09 DIAGNOSIS — E55.9 VITAMIN D DEFICIENCY: ICD-10-CM

## 2023-06-09 DIAGNOSIS — Z09 HOSPITAL DISCHARGE FOLLOW-UP: ICD-10-CM

## 2023-06-09 DIAGNOSIS — Z13.228 SCREENING FOR METABOLIC DISORDER: ICD-10-CM

## 2023-06-09 DIAGNOSIS — R00.0 TACHYCARDIA: ICD-10-CM

## 2023-06-09 PROCEDURE — 3079F DIAST BP 80-89 MM HG: CPT | Performed by: NURSE PRACTITIONER

## 2023-06-09 PROCEDURE — 99214 OFFICE O/P EST MOD 30 MIN: CPT | Performed by: NURSE PRACTITIONER

## 2023-06-09 PROCEDURE — 3077F SYST BP >= 140 MM HG: CPT | Performed by: NURSE PRACTITIONER

## 2023-06-09 RX ORDER — ACETAMINOPHEN 325 MG/1
325 TABLET ORAL
COMMUNITY
End: 2023-06-09

## 2023-06-09 RX ORDER — OXYBUTYNIN CHLORIDE 5 MG/1
5 TABLET ORAL
COMMUNITY
End: 2023-06-09

## 2023-06-09 ASSESSMENT — ENCOUNTER SYMPTOMS
GASTROINTESTINAL NEGATIVE: 1
SHORTNESS OF BREATH: 0
EYES NEGATIVE: 1
PALPITATIONS: 0
NEUROLOGICAL NEGATIVE: 1
SPUTUM PRODUCTION: 0
FEVER: 0
CONSTITUTIONAL NEGATIVE: 1
PSYCHIATRIC NEGATIVE: 1
COUGH: 0
MUSCULOSKELETAL NEGATIVE: 1

## 2023-06-09 ASSESSMENT — PATIENT HEALTH QUESTIONNAIRE - PHQ9
SUM OF ALL RESPONSES TO PHQ QUESTIONS 1-9: 3
CLINICAL INTERPRETATION OF PHQ2 SCORE: 1
5. POOR APPETITE OR OVEREATING: 1 - SEVERAL DAYS

## 2023-06-09 ASSESSMENT — FIBROSIS 4 INDEX: FIB4 SCORE: 1.03

## 2023-06-09 NOTE — LETTER
[employer]  JENN PardoNJieP.  [address]  [place] [address]  Fax: [phone]   Authorization for Release/Disclosure of   Protected Health Information   Name: CHERYL NATARAJAN : 1950 SSN: [id number]   Address: [address] [place] [address]  [place] [address] Phone:    [phone] (home)    I authorize the entity listed below to release/disclose the PHI below to:   [employer]/Chiara Alvarez D.N.P. and Chiara Alvarez D.N.P.   Provider or Entity Name: [facility]      Address   City, State, Zip   Phone:      Fax:[phone]       Reason for request: continuity of care   Information to be released:    [  ] LAST COLONOSCOPY,  including any PATH REPORT and follow-up  [  ] LAST FIT/COLOGUARD RESULT [  ] LAST DEXA  [  ] LAST MAMMOGRAM  [  ] LAST PAP  [  ] LAST LABS [  ] RETINA EXAM REPORT  [  ] IMMUNIZATION RECORDS  [  XX] Release all info  [XX] HOSPITAL VISIT      [  ] Check here and initial the line next to each item to release ALL health information INCLUDING  _____ Care and treatment for drug and / or alcohol abuse  _____ HIV testing, infection status, or AIDS  _____ Genetic Testing    DATES OF SERVICE OR TIME PERIOD TO BE DISCLOSED: _____________  I understand and acknowledge that:  * This Authorization may be revoked at any time by you in writing, except if your health information has already been used or disclosed.  * Your health information that will be used or disclosed as a result of you signing this authorization could be re-disclosed by the recipient. If this occurs, your re-disclosed health information may no longer be protected by State or Federal laws.  * You may refuse to sign this Authorization. Your refusal will not affect your ability to obtain treatment.  * This Authorization becomes effective upon signing and will  on (date) __________.      If no date is indicated, this Authorization will  one (1) year from the signature date.    Name: Cheryl Natarajan  Signature: Date:   2023      PLEASE FAX REQUESTED RECORDS BACK TO: [phone]

## 2023-06-09 NOTE — PROGRESS NOTES
Subjective       CC:   Chief Complaint   Patient presents with    Hospital Follow-up     Hr was over 142 resting rate .         HPI:   Patient is a 72 y.o. established female patient with medical history listed below here today for hospital follow up.   States she was at home on 5/31/2023 and her watch alerted her of elevated HR multiple times that day. She did not have CP, SOB, dizziness with this. She presented to [facility] ER same day for evaluation. Was told EKG and lab work was normal. She received IV hydration and discharged. Was told her BP was elevated, not started on antihypertensives. She has cardiology appointment on 7/10/2023 with Dr Rodrigues.     Patient Active Problem List   Diagnosis    Vitamin D deficiency    Mild intermittent asthma without complication    Obesity (BMI 30-39.9)    STEVEN (obstructive sleep apnea)    Urge incontinence of urine    Age-related osteoporosis without current pathological fracture    Memory loss    Gastroesophageal reflux disease without esophagitis    Preop examination    Chronic pain of right hip    Primary insomnia    COVID-19       Past Medical History:   Diagnosis Date    Asthma     Bronchitis     Depression     History of kidney stones 7/25/2017    Influenza     Pap smear     Positive PPD     Tonsillitis         Past Surgical History:   Procedure Laterality Date    COLONOSCOPY  2/17/2016    internal hemorrhoids    HEMORRHOIDECTOMY  5/08    COLONOSCOPY  2005    normal    GASTRIC BYPASS LAPAROSCOPIC  2001    NODULE EXCISION      Performed by EDELMIRA MONTE at [facility]    VA REMV 2ND CATARACT,CORN-SCLER SECTN      TONSILLECTOMY      US-NEEDLE CORE BX-BREAST PANEL          Current Outpatient Medications on File Prior to Visit   Medication Sig Dispense Refill    acetaminophen (TYLENOL) 325 MG Tab Take 1 Tablet by mouth.      Calcium Citrate-Vitamin D 315-250 MG-UNIT Tab Take 1 Tablet by mouth 2 times a day.      Multiple Vitamin (MULTI-VITAMIN) Tab Take 1 Tablet  "by mouth every day.      Zinc Acetate, Oral, 25 MG Cap Take 1 Capsule by mouth every day.      oxybutynin (DITROPAN) 5 MG Tab Take 1 Tablet by mouth 2 times a day as needed. 180 Tablet 3    albuterol 108 (90 Base) MCG/ACT Aero Soln inhalation aerosol Inhale 2 Puffs by mouth every 6 hours as needed for Shortness of Breath.      Probiotic Product (PROBIOTIC ACIDOPHILUS) Cap Take  by mouth every day.      Mometasone Furo-Formoterol Fum (DULERA) 200-5 MCG/ACT Aerosol Inhale  by mouth.       No current facility-administered medications on file prior to visit.      ROS:  Review of Systems   Constitutional: Negative.  Negative for fever and malaise/fatigue.   HENT: Negative.     Eyes: Negative.    Respiratory:  Negative for cough, sputum production and shortness of breath.    Cardiovascular:  Negative for chest pain, palpitations and leg swelling.   Gastrointestinal: Negative.    Genitourinary: Negative.    Musculoskeletal: Negative.    Neurological: Negative.    Endo/Heme/Allergies: Negative.    Psychiatric/Behavioral: Negative.         Objective       Exam:  BP (!) 142/82 (BP Location: Left arm, Patient Position: Sitting, BP Cuff Size: Adult)   Pulse 95   Temp 36.8 °C (98.2 °F) (Temporal)   Ht 1.753 m (5' 9\")   Wt 94.3 kg (208 lb)   LMP 05/17/2007   SpO2 99%   BMI 30.72 kg/m²  Body mass index is 30.72 kg/m².    Physical Exam  Constitutional:       Appearance: Normal appearance.   Cardiovascular:      Rate and Rhythm: Normal rate and regular rhythm.      Pulses: Normal pulses.      Heart sounds: Normal heart sounds. No murmur heard.  Pulmonary:      Effort: Pulmonary effort is normal.      Breath sounds: Normal breath sounds.   Musculoskeletal:         General: Normal range of motion.      Cervical back: Normal range of motion and neck supple.      Right lower leg: No edema.      Left lower leg: No edema.   Skin:     General: Skin is warm and dry.   Neurological:      General: No focal deficit present.      Mental " Status: She is alert and oriented to person, place, and time.   Psychiatric:         Mood and Affect: Mood normal.         Behavior: Behavior normal.         Thought Content: Thought content normal.         Judgment: Judgment normal.         Assessment & Plan       72 y.o. female with the following -   1. Hospital discharge follow-up  2. Tachycardia  Multiple episodes of asymptomatic tachycardia on 5/31/2023, alerted by her watch; evaluated at [facility] [place] ER same day; told EKG, labs normal. Told BP was elevated in ER. Today in clinic /82, HR 95. Denies CP, SOB, palpitations, dizziness, edema. We will get records for review from her ER visit. States HR has stayed  since then.   - she has cardiology appointment on 7/10/2023 with Dr Rodrigues  - message her when I get [facility] records and send in 2nd cardiology referral for earlier appointment, holter monitor   - keep weekly log of BP at home, bring in for review at next visit. If averaging > 130/80 we can start medication.     3. Vitamin D deficiency  Recheck level prior to next visit.   - VITAMIN D,25 HYDROXY (DEFICIENCY); Future    4. Screening for metabolic disorder  Due for annual labs  - Comp Metabolic Panel; Future  - CBC WITHOUT DIFFERENTIAL; Future  - Lipid Profile; Future  - TSH WITH REFLEX TO FT4; Future    Return in about 2 weeks (around 6/23/2023) for htn, tachycardia; review labs.    Please note that this dictation was created using voice recognition software. I have made every reasonable attempt to correct obvious errors, but I expect that there are errors of grammar and possibly content that I did not discover before finalizing the note.

## 2023-06-22 ENCOUNTER — HOSPITAL ENCOUNTER (OUTPATIENT)
Dept: LAB | Facility: MEDICAL CENTER | Age: 73
End: 2023-06-22
Attending: NURSE PRACTITIONER
Payer: MEDICARE

## 2023-06-22 DIAGNOSIS — E55.9 VITAMIN D DEFICIENCY: ICD-10-CM

## 2023-06-22 DIAGNOSIS — Z13.228 SCREENING FOR METABOLIC DISORDER: ICD-10-CM

## 2023-06-22 LAB
25(OH)D3 SERPL-MCNC: 44 NG/ML (ref 30–100)
ALBUMIN SERPL BCP-MCNC: 4.1 G/DL (ref 3.2–4.9)
ALBUMIN/GLOB SERPL: 1.6 G/DL
ALP SERPL-CCNC: 75 U/L (ref 30–99)
ALT SERPL-CCNC: 20 U/L (ref 2–50)
ANION GAP SERPL CALC-SCNC: 10 MMOL/L (ref 7–16)
AST SERPL-CCNC: 18 U/L (ref 12–45)
BILIRUB SERPL-MCNC: 0.5 MG/DL (ref 0.1–1.5)
BUN SERPL-MCNC: 14 MG/DL (ref 8–22)
CALCIUM ALBUM COR SERPL-MCNC: 9.4 MG/DL (ref 8.5–10.5)
CALCIUM SERPL-MCNC: 9.5 MG/DL (ref 8.5–10.5)
CHLORIDE SERPL-SCNC: 103 MMOL/L (ref 96–112)
CHOLEST SERPL-MCNC: 156 MG/DL (ref 100–199)
CO2 SERPL-SCNC: 27 MMOL/L (ref 20–33)
CREAT SERPL-MCNC: 0.59 MG/DL (ref 0.5–1.4)
ERYTHROCYTE [DISTWIDTH] IN BLOOD BY AUTOMATED COUNT: 49.5 FL (ref 35.9–50)
FASTING STATUS PATIENT QL REPORTED: NORMAL
GFR SERPLBLD CREATININE-BSD FMLA CKD-EPI: 95 ML/MIN/1.73 M 2
GLOBULIN SER CALC-MCNC: 2.5 G/DL (ref 1.9–3.5)
GLUCOSE SERPL-MCNC: 82 MG/DL (ref 65–99)
HCT VFR BLD AUTO: 40 % (ref 37–47)
HDLC SERPL-MCNC: 78 MG/DL
HGB BLD-MCNC: 12.1 G/DL (ref 12–16)
LDLC SERPL CALC-MCNC: 66 MG/DL
MCH RBC QN AUTO: 26.9 PG (ref 27–33)
MCHC RBC AUTO-ENTMCNC: 30.3 G/DL (ref 32.2–35.5)
MCV RBC AUTO: 89.1 FL (ref 81.4–97.8)
PLATELET # BLD AUTO: 292 K/UL (ref 164–446)
PMV BLD AUTO: 10 FL (ref 9–12.9)
POTASSIUM SERPL-SCNC: 4.4 MMOL/L (ref 3.6–5.5)
PROT SERPL-MCNC: 6.6 G/DL (ref 6–8.2)
RBC # BLD AUTO: 4.49 M/UL (ref 4.2–5.4)
SODIUM SERPL-SCNC: 140 MMOL/L (ref 135–145)
TRIGL SERPL-MCNC: 62 MG/DL (ref 0–149)
TSH SERPL DL<=0.005 MIU/L-ACNC: 1.5 UIU/ML (ref 0.38–5.33)
WBC # BLD AUTO: 6.4 K/UL (ref 4.8–10.8)

## 2023-06-22 PROCEDURE — 85027 COMPLETE CBC AUTOMATED: CPT

## 2023-06-22 PROCEDURE — 80061 LIPID PANEL: CPT | Mod: GA

## 2023-06-22 PROCEDURE — 36415 COLL VENOUS BLD VENIPUNCTURE: CPT

## 2023-06-22 PROCEDURE — 84443 ASSAY THYROID STIM HORMONE: CPT | Mod: GA

## 2023-06-22 PROCEDURE — 80053 COMPREHEN METABOLIC PANEL: CPT

## 2023-06-22 PROCEDURE — 82306 VITAMIN D 25 HYDROXY: CPT

## 2023-06-27 ENCOUNTER — OFFICE VISIT (OUTPATIENT)
Dept: MEDICAL GROUP | Facility: PHYSICIAN GROUP | Age: 73
End: 2023-06-27
Payer: MEDICARE

## 2023-06-27 VITALS
HEIGHT: 69 IN | DIASTOLIC BLOOD PRESSURE: 72 MMHG | OXYGEN SATURATION: 97 % | RESPIRATION RATE: 16 BRPM | TEMPERATURE: 97.9 F | SYSTOLIC BLOOD PRESSURE: 114 MMHG | WEIGHT: 206 LBS | HEART RATE: 68 BPM | BODY MASS INDEX: 30.51 KG/M2

## 2023-06-27 DIAGNOSIS — N39.41 URGE INCONTINENCE OF URINE: ICD-10-CM

## 2023-06-27 DIAGNOSIS — F51.01 PRIMARY INSOMNIA: ICD-10-CM

## 2023-06-27 DIAGNOSIS — E66.9 OBESITY (BMI 30-39.9): ICD-10-CM

## 2023-06-27 DIAGNOSIS — M81.0 AGE-RELATED OSTEOPOROSIS WITHOUT CURRENT PATHOLOGICAL FRACTURE: ICD-10-CM

## 2023-06-27 DIAGNOSIS — J45.20 MILD INTERMITTENT ASTHMA WITHOUT COMPLICATION: ICD-10-CM

## 2023-06-27 DIAGNOSIS — E55.9 VITAMIN D DEFICIENCY: ICD-10-CM

## 2023-06-27 DIAGNOSIS — Z00.00 ENCOUNTER FOR ANNUAL WELLNESS VISIT (AWV) IN MEDICARE PATIENT: ICD-10-CM

## 2023-06-27 PROCEDURE — 3074F SYST BP LT 130 MM HG: CPT | Performed by: NURSE PRACTITIONER

## 2023-06-27 PROCEDURE — G0439 PPPS, SUBSEQ VISIT: HCPCS | Performed by: NURSE PRACTITIONER

## 2023-06-27 PROCEDURE — 3078F DIAST BP <80 MM HG: CPT | Performed by: NURSE PRACTITIONER

## 2023-06-27 RX ORDER — MULTIVIT WITH MINERALS/LUTEIN
TABLET ORAL
COMMUNITY

## 2023-06-27 ASSESSMENT — ENCOUNTER SYMPTOMS: GENERAL WELL-BEING: GOOD

## 2023-06-27 ASSESSMENT — PATIENT HEALTH QUESTIONNAIRE - PHQ9: CLINICAL INTERPRETATION OF PHQ2 SCORE: 0

## 2023-06-27 ASSESSMENT — FIBROSIS 4 INDEX: FIB4 SCORE: 1.006230589874905363

## 2023-06-27 ASSESSMENT — ACTIVITIES OF DAILY LIVING (ADL): BATHING_REQUIRES_ASSISTANCE: 0

## 2023-06-27 NOTE — PROGRESS NOTES
Chief Complaint   Patient presents with    Annual Exam    [facility] Follow-up     Dx Tachycardia, has logs since       HPI:  Betty Natarajan is a 72 y.o. here for Medicare Annual Wellness Visit. She is doing well today and has no new concerns.   She was seen at [facility] ER last month 5/31/2023 after her watch alerted her to elevated HR. She has appointment with Dr Boswell on 7/10/2023. Since then she has noted HR 60-80 with her watch. No CP or dizziness.     Patient Active Problem List    Diagnosis Date Noted    COVID-19 01/04/2022    Primary insomnia 12/21/2021    Preop examination 09/07/2021    Chronic pain of right hip 09/07/2021    Gastroesophageal reflux disease without esophagitis 05/25/2021    Memory loss 07/31/2019    Age-related osteoporosis without current pathological fracture 02/02/2018    Urge incontinence of urine 07/25/2017    STEVEN (obstructive sleep apnea) 05/05/2017    Obesity (BMI 30-39.9) 12/14/2016    Mild intermittent asthma without complication 02/01/2013    Vitamin D deficiency 08/04/2010       Current Outpatient Medications   Medication Sig Dispense Refill    vitamin E (VITAMIN SUPPLEMENT E-1000) 1000 units (450 mg) Cap VEGETABLE POWDER SUPPLEMENT: Dissolve and drink as directed  for supplement.      acetaminophen (TYLENOL) 325 MG Tab Take 1 Tablet by mouth.      Calcium Citrate-Vitamin D 315-250 MG-UNIT Tab Take 1 Tablet by mouth 2 times a day.      Multiple Vitamin (MULTI-VITAMIN) Tab Take 1 Tablet by mouth every day.      Zinc Acetate, Oral, 25 MG Cap Take 1 Capsule by mouth every day.      oxybutynin (DITROPAN) 5 MG Tab Take 1 Tablet by mouth 2 times a day as needed. 180 Tablet 3    albuterol 108 (90 Base) MCG/ACT Aero Soln inhalation aerosol Inhale 2 Puffs by mouth every 6 hours as needed for Shortness of Breath.      Probiotic Product (PROBIOTIC ACIDOPHILUS) Cap Take  by mouth every day.      Mometasone Furo-Formoterol Fum (DULERA) 200-5 MCG/ACT Aerosol Inhale   by mouth.       No current facility-administered medications for this visit.          Current supplements as per medication list.     Allergies: Pcn [penicillins]    Current social contact/activities: Read, spend time with friends/family, bowling, card games, phone, garden, yardwork     She  reports that she has never smoked. She has never used smokeless tobacco. She reports current alcohol use. She reports that she does not use drugs.  Counseling given: Not Answered      ROS:    Gait: Uses no assistive device  Ostomy: No  Other tubes: No  Amputations: No  Chronic oxygen use: No  Last eye exam: January  Wears hearing aids: No   : Reports urinary leakage during the last 6 months that has not interfered at all with their daily activities or sleep.    Screening:  Annual  Depression Screening  Little interest or pleasure in doing things?  0 - not at all  Feeling down, depressed , or hopeless? 0 - not at all  Patient Health Questionnaire Score: 0     If depressive symptoms identified deferred to follow up visit unless specifically addressed in assessment and plan.    Interpretation of PHQ-9 Total Score   Score Severity   1-4 No Depression   5-9 Mild Depression   10-14 Moderate Depression   15-19 Moderately Severe Depression   20-27 Severe Depression    Screening for Cognitive Impairment  Three Minute Recall (daughter, heaven, mountain) 3/3    Jeison clock face with all 12 numbers and set the hands to show 10 past 11.  Yes    Cognitive concerns identified deferred for follow up unless specifically addressed in assessment and plan.    Fall Risk Assessment  Has the patient had two or more falls in the last year or any fall with injury in the last year?  No    Safety Assessment  Throw rugs on floor.  No  Handrails on all stairs.  No  Good lighting in all hallways.  Yes  Difficulty hearing.  No  Patient counseled about all safety risks that were identified.    Functional Assessment ADLs  Are there any barriers preventing you  from cooking for yourself or meeting nutritional needs?  No.    Are there any barriers preventing you from driving safely or obtaining transportation?  No.    Are there any barriers preventing you from using a telephone or calling for help?  No.    Are there any barriers preventing you from shopping?  No.    Are there any barriers preventing you from taking care of your own finances?  No.    Are there any barriers preventing you from managing your medications?  No.    Are there any barriers preventing you from showering, bathing or dressing yourself?  No.    Are you currently engaging in any exercise or physical activity?  Yes.     What is your perception of your health?  Good    Advance Care Planning  Do you have an Advance Directive, Living Will, Durable Power of , or POLST? Yes  Advance Directive Living Will            Health Maintenance Summary            Overdue - COVID-19 Vaccine (1) Overdue - never done      No completion history exists for this topic.              Overdue - IMM DTaP/Tdap/Td Vaccine (2 - Td or Tdap) Overdue since 8/17/2021 08/17/2011  Imm Admin: Tdap Vaccine    10/15/2006  Imm Admin: TD Vaccine              BONE DENSITY (Every 2 Years) Next due on 7/28/2023 07/29/2020  DS-BONE DENSITY STUDY (DEXA)    02/01/2018  Done    02/01/2018  DS-BONE DENSITY STUDY (DEXA)    01/16/2017  DS-BONE DENSITY STUDY (DEXA)    01/06/2016  DS-BONE DENSITY STUDY (DEXA)    Only the first 5 history entries have been loaded, but more history exists.              IMM INFLUENZA (Season Ended) Next due on 9/1/2023      10/05/2020  Imm Admin: Influenza Vaccine Adult HD    11/03/2019  Imm Admin: Influenza Vaccine Adult HD    11/03/2019  Imm Admin: Influenza, Unspecified - HISTORICAL DATA    10/09/2018  Imm Admin: Influenza Vaccine Adult HD    09/27/2017  Imm Admin: Influenza Vaccine Adult HD    Only the first 5 history entries have been loaded, but more history exists.              MAMMOGRAM (Every 2  Years) Next due on 10/14/2024      10/14/2022  MA-SCREENING MAMMO BILAT W/TOMOSYNTHESIS W/CAD    07/29/2020  MA-SCREENING MAMMO BILAT W/TOMOSYNTHESIS W/CAD    04/11/2019  MA-SCREEN MAMMO W/CAD-BILAT    01/18/2018  MA-MAMMO SCREENING BILAT W/YAZMIN W/CAD    01/16/2017  MA-MAMMO SCREENING BILAT W/YAZMIN W/CAD    Only the first 5 history entries have been loaded, but more history exists.              COLORECTAL CANCER SCREENING (COLONOSCOPY - Preferred) Next due on 7/20/2031 07/20/2021  REFERRAL TO GI FOR COLONOSCOPY    07/20/2021  REFERRAL TO GI FOR COLONOSCOPY    02/17/2016  AMB REFERRAL TO GI FOR COLONOSCOPY    02/04/2009  OCCULT BLOOD X3 (STOOL)              IMM PNEUMOCOCCAL VACCINE: 65+ Years (Series Information) Completed      12/15/2015  Imm Admin: Pneumococcal Conjugate Vaccine (Prevnar/PCV-13)    10/28/2014  Imm Admin: Pneumococcal polysaccharide vaccine (PPSV-23)    10/28/2014  Imm Admin: Pneumococcal Conjugate Vaccine (Prevnar/PCV-13)    12/21/2009  Imm Admin: Pneumococcal Vaccine (UF) - HISTORICAL DATA              HEPATITIS C SCREENING  Completed      06/29/2017  Hepatitis C Antibody component of HEP C VIRUS ANTIBODY              IMM ZOSTER VACCINES (Series Information) Completed      11/01/2019  Imm Admin: Zoster Vaccine Recombinant (RZV) (SHINGRIX)    10/28/2014  Imm Admin: Zoster Vaccine Live (ZVL) (Zostavax) - HISTORICAL DATA    11/01/2009  Imm Admin: Zoster Vaccine Recombinant (RZV) (SHINGRIX)              IMM HEP B VACCINE (Series Information) Aged Out      No completion history exists for this topic.              HPV Vaccines (Series Information) Aged Out      No completion history exists for this topic.              IMM MENINGOCOCCAL ACWY VACCINE (Series Information) Aged Out      No completion history exists for this topic.              Discontinued - CERVICAL CANCER SCREENING  Discontinued        Frequency changed to Never automatically (Topic No Longer Applies)    07/25/2017  PATHOLOGY GYN  SPECIMEN    07/25/2017  THINPREP PAP,REFLEX HPV ON ASC-US ONLY    10/28/2014  THINPREP PAP WITH HPV    10/28/2014  PATHOLOGY GYN SPECIMEN    Only the first 5 history entries have been loaded, but more history exists.              Discontinued - Annual Wellness Visit  Discontinued        Frequency changed to Never automatically (Topic No Longer Applies)    06/23/2022  Level of Service: ANNUAL WELLNESS VISIT-INCLUDES PPPS SUBSEQUE*    12/15/2015  Visit Dx: Medicare annual wellness visit, initial                    Patient Care Team:  Chiara Alvarez D.N.P. as PCP - General (Nurse Practitioner Family)  Peter Nguyen M.D. as Consulting Physician (Pediatric Pulmonology)        Social History     Tobacco Use    Smoking status: Never    Smokeless tobacco: Never   Vaping Use    Vaping Use: Never used   Substance Use Topics    Alcohol use: Yes     Alcohol/week: 0.0 oz     Comment: occasional    Drug use: No     Family History   Problem Relation Age of Onset    Diabetes Father     Hypertension Father     Genitourinary () Problems Father         renal failure on dialysis    Cancer Brother         brain tumor    Heart Attack Brother     No Known Problems Brother     No Known Problems Brother     Breast Cancer Maternal Aunt     Cancer Maternal Aunt     Breast Cancer Daughter     Sleep Apnea Neg Hx      She  has a past medical history of Asthma, Bronchitis, Depression, History of kidney stones (7/25/2017), Influenza, Pap smear, Positive PPD, and Tonsillitis.   Past Surgical History:   Procedure Laterality Date    COLONOSCOPY  2/17/2016    internal hemorrhoids    HEMORRHOIDECTOMY  5/08    COLONOSCOPY  2005    normal    GASTRIC BYPASS LAPAROSCOPIC  2001    NODULE EXCISION      Performed by EDELMIRA MONTE at [facility]    [place] REMV 2ND CATARACT,CORN-SCLER SECTN      TONSILLECTOMY      US-NEEDLE CORE BX-BREAST PANEL         Exam:   /72   Pulse 68   Temp 36.6 °C (97.9 °F) (Temporal)   Resp 16   Ht 1.753 m (5'  "9\")   Wt 93.4 kg (206 lb)   SpO2 97%  Body mass index is 30.42 kg/m².    Hearing excellent.    Dentition good  Alert, oriented in no acute distress.  Eye contact is good, speech goal directed, affect calm    Physical Exam  Constitutional:       Appearance: Normal appearance.   HENT:      Right Ear: Tympanic membrane, ear canal and external ear normal.      Left Ear: Tympanic membrane, ear canal and external ear normal.      Nose: Nose normal.      Mouth/Throat:      Mouth: Mucous membranes are moist.      Pharynx: Oropharynx is clear.   Eyes:      Pupils: Pupils are equal, round, and reactive to light.   Cardiovascular:      Rate and Rhythm: Normal rate and regular rhythm.      Pulses: Normal pulses.      Heart sounds: Normal heart sounds.   Pulmonary:      Effort: Pulmonary effort is normal.      Breath sounds: Normal breath sounds.   Musculoskeletal:         General: Normal range of motion.      Cervical back: Normal range of motion and neck supple.      Right lower leg: No edema.      Left lower leg: No edema.   Skin:     General: Skin is warm and dry.   Neurological:      General: No focal deficit present.      Mental Status: She is alert and oriented to person, place, and time.   Psychiatric:         Mood and Affect: Mood normal.         Behavior: Behavior normal.         Thought Content: Thought content normal.         Judgment: Judgment normal.       Assessment and Plan. The following treatment and monitoring plan is recommended:    1. Mild intermittent asthma without complication  Followed by pulm; currently on dulera 2 puffs QAM; prn albuterol; no asthma related hospitalizations this year;   Denies SOB, CP, cough, fever    2. Primary insomnia  No longer needing melatonin; has sleep aid ear buds.     3. Age-related osteoporosis without current pathological fracture  Right hip repair- 2/2021  Left knee replacement around 3/2022; using cane now due to pain  Taking multiple supplements daily including vit D, " calcium  Next dexa in 2-3yrs (2022/2023)  No new falls/fractures reported    4. Urge incontinence of urine  Symptoms managed with ditropan 5mg BID prm; she takes on days when she will be outdoors    5. Vitamin D deficiency  Takes multiple vitamins/supplements daily including D, A, zinc, calcium    6. Obesity (BMI 30-39.9)  - Patient identified as having weight management issue.  Appropriate orders and counseling given.    7. Encounter for annual wellness visit (AWV) in Medicare patient  Services suggested: No services needed at this time  Health Care Screening: Age-appropriate preventive services recommended by USPTF and ACIP covered by Medicare were discussed today. Services ordered if indicated and agreed upon by the patient.  Referrals offered: Community-based lifestyle interventions to reduce health risks and promote self-management and wellness, fall prevention, nutrition, physical activity, tobacco-use cessation, weight loss, and mental health services as per orders if indicated.    Discussion today about general wellness and lifestyle habits:    Prevent falls and reduce trip hazards; Cautioned about securing or removing rugs.  Have a working fire alarm and carbon monoxide detector;   Engage in regular physical activity and social activities     Follow-up: Return in about 1 year (around 6/27/2024) for Medicare Annual Visit.

## 2023-06-27 NOTE — PROGRESS NOTES
Subjective:     CC:   Chief Complaint   Patient presents with    Annual Exam    [facility]     Dx Tachycardia, has logs since       HPI:   72 y.o. female here today for annual exam. She is feeling well and denies any complaints.    Ob-Gyn/ History:    Patient has GYN provider: {YES/NO:63}  /Para:  ***  Last Pap Smear:  ***. *** history of abnormal pap smears.  Gyn Surgery:  ***.  Current Contraceptive Method:  ***. *** currently sexually active.  Last menstrual period:  ***.  Periods regular. {Description; bleeding vaginal:56039} bleeding. Cramping is {MILD/MOD/SEVERE/VARIABLE:11004}.   She {DOES/DOES NOT:70563} take OTC analgesics for cramps.  No significant bloating/fluid retention, pelvic pain, or dyspareunia. No vaginal discharge  Post-menopausal bleeding: ***  Urinary incontinence: ***  Folate intake: *** {F childbearing age folate 0.4-0.8mg daily. USPSTF: A}    Health Maintenance  Advanced directive: *** {age >=65}  Osteoporosis Screen/ DEXA: *** {once 65 + or risk factors, frax tool}  PT/vit D for falls prevention: *** {65+ if higher risk for falls}  Cholesterol Screening: *** {45+ yo, q5yr}  Diabetes Screening: *** {40-71 yo who are overweight or obese}  Aspirin Use: ***  {50-59 for the primary prevention of cardiovascular disease (CVD) and colorectal cancer (CRC) if: 10% or greater 10-year CVD risk, not at increased risk for bleeding, life expectancy >10 years, and willing to take low-dose aspirin daily for 10 years. USPSTF: B}  Diet: *** {BMI ?25 diet & physical activity counseling, BMI ?30 offer or referral for intervention. USPSTF: B}  Exercise: *** {BMI ?25 diet & physical activity counseling, BMI ?30 offer or referral for intervention. USPSTF: B}  Substance Abuse: *** {counseling if alcohol misuse. USPSTF: B}  Safe in relationship. {F childbearing age intimate partner violence screening. USPSTF: B}  Seat belts, bike helmet, gun safety discussed.  Sun  protection used.    Cancer screening  Colorectal Cancer Screening: ***  {50-75 yearly FIT testing, colonoscopy every 10 years, or flex sig every 5 years. USPSTF: A. 45-75 if /. PN Policy}  Lung Cancer Screening: ***  {50-80 yearly screen with 30 pack-years, currently smoke or quit <15 years. USPSTF: B}  Cervical Cancer Screening: *** {21-65 every 3-5 years if nl screening. USPSTF: A}  Breast Cancer Screening: *** {50-75 mammography every 2 years. USPSTF: B.  For age 40-50: out of 1000 women, 1 with BC saved.  2 will be dx/tx that wouldn't have caused problems or needed tx. 67 with normal bx. 576 with false positive mammo}    Infectious disease screening/Immunizations  --STI Screening: *** {GC/CT F16-24 yearly, USPSTF: B/A}  --Practices safe sex.  --HIV Screening: *** {15-65 once. USPSTF: A}  --Hepatitis C Screening: *** {18-79 once. USPSTF: B}  --Immunizations:    Influenza: *** {yearly}   HPV:  *** {F18-26, M18-21, M to 26 may be vaccinated, 3 doses}   Tetanus: *** {every ten years with at least one tdap}   Shingles: n/a {once, >50, should call insurance or go through pharmacy}   Pneumococcal : ***      {urh22-84, categories: (updated 7/13/2015)   1. Chronic heart/lung/liver disease, DM, alcoholism, cigarette smoking =>   recommend PPSV23 (Pneumovax) only    --give PPSV23   2. CSF leak, cochlear implant => recommend both vaccines    --if previous PPSV23, give PCV13 (Prevnar) ?1yr after PPSV23    --if neither, give PCV13, then PPSV23 ?8wk later   3. Sickle disease/hemoglobinopathies, asplenia, immunodeficiencies, HIV,    CKD, leukemia, lymphoma/Hodgkin, malignancy, iatrogenic immunodef,   solid organ transplant, mult myeloma => recommend both vaccines plus   2nd PPSV23    --if previous PPSV23, give PCV13 ?1yr after PPSV23    --give 2nd PPSV23 ?8wk after PCV13 and ?5yr after previous PPSV23    --if none previously, give PCV13 now and give PPSV23 ?8wk later    --give PPSV23 ?5yr after  previous PPSV23. CDC/ACIP}       {age>=65:   1. Neither vaccine or unknown hx => recommend both vaccines    --PCV13 (Prevnar) now, PPSV23 (Pneumovax) ?1yr later   2. PPSV23 before age 65, no PCV13 => recommend both vaccines    --PCV ?1yr after PPSV23    --2nd PPSV23 ?1yr after PCV13 and ?5yr after previous PPSV23   3. PPSV23 after age 65, no PCV13 => recommend PCV13 only    --PVC13 ?1yr after PPSV23   4. PCV13 and PPSV23 before age 65 => recommend PPSV23 only    --PPSV23 ?1yr after PCV13 and ?5 years after previous PPSV23   5. PCV13 and PPSV23 after age 65 => done! CDC/ACIP}   COVID-19: ***  Other immunizations: *** {Consider meningococcal, hepatitis A, hepatitis B, HIB.}    She  has a past medical history of Asthma, Bronchitis, Depression, History of kidney stones (7/25/2017), Influenza, Pap smear, Positive PPD, and Tonsillitis.  She  has a past surgical history that includes hemorrhoidectomy (5/08); nodule excision; gastric bypass laparoscopic (2001); colonoscopy (2005); us-needle core bx-breast panel; colonoscopy (2/17/2016); pr remv 2nd cataract,corn-scler sectn; and tonsillectomy.    Family History   Problem Relation Age of Onset    Diabetes Father     Hypertension Father     Genitourinary () Problems Father         renal failure on dialysis    Cancer Brother         brain tumor    Heart Attack Brother     No Known Problems Brother     No Known Problems Brother     Breast Cancer Maternal Aunt     Cancer Maternal Aunt     Breast Cancer Daughter     Sleep Apnea Neg Hx        Social History     Socioeconomic History    Marital status:      Spouse name: Not on file    Number of children: Not on file    Years of education: Not on file    Highest education level: Some college, no degree   Occupational History    Occupation: retired     Employer: RETIRED   Tobacco Use    Smoking status: Never    Smokeless tobacco: Never   Vaping Use    Vaping Use: Never used   Substance and Sexual Activity    Alcohol use:  Yes     Alcohol/week: 0.0 oz     Comment: occasional    Drug use: No    Sexual activity: Not Currently   Other Topics Concern    Not on file   Social History Narrative    Not on file     Social Determinants of Health     Financial Resource Strain: Low Risk  (6/20/2022)    Overall Financial Resource Strain (CARDIA)     Difficulty of Paying Living Expenses: Not hard at all   Food Insecurity: No Food Insecurity (6/20/2022)    Hunger Vital Sign     Worried About Running Out of Food in the Last Year: Never true     Ran Out of Food in the Last Year: Never true   Transportation Needs: No Transportation Needs (6/20/2022)    PRAPARE - Transportation     Lack of Transportation (Medical): No     Lack of Transportation (Non-Medical): No   Physical Activity: Insufficiently Active (6/20/2022)    Exercise Vital Sign     Days of Exercise per Week: 2 days     Minutes of Exercise per Session: 20 min   Stress: No Stress Concern Present (6/20/2022)    [employer] - Occupational Stress Questionnaire     Feeling of Stress : Only a little   Social Connections: Moderately Integrated (6/20/2022)    Social Connection and Isolation Panel [NHANES]     Frequency of Communication with Friends and Family: More than three times a week     Frequency of Social Gatherings with Friends and Family: More than three times a week     Attends Hinduism Services: 1 to 4 times per year     Active Member of Clubs or Organizations: Yes     Attends Club or Organization Meetings: More than 4 times per year     Marital Status:    Intimate Partner Violence: Not on file   Housing Stability: Low Risk  (6/20/2022)    Housing Stability Vital Sign     Unable to Pay for Housing in the Last Year: No     Number of Places Lived in the Last Year: 1     Unstable Housing in the Last Year: No       Patient Active Problem List    Diagnosis Date Noted    COVID-19 01/04/2022    Primary insomnia 12/21/2021    Preop examination 09/07/2021     Chronic pain of right hip 09/07/2021    Gastroesophageal reflux disease without esophagitis 05/25/2021    Memory loss 07/31/2019    Age-related osteoporosis without current pathological fracture 02/02/2018    Urge incontinence of urine 07/25/2017    STEVEN (obstructive sleep apnea) 05/05/2017    Obesity (BMI 30-39.9) 12/14/2016    Mild intermittent asthma without complication 02/01/2013    Vitamin D deficiency 08/04/2010         Current Outpatient Medications   Medication Sig Dispense Refill    vitamin E (VITAMIN SUPPLEMENT E-1000) 1000 units (450 mg) Cap VEGETABLE POWDER SUPPLEMENT: Dissolve and drink as directed  for supplement.      acetaminophen (TYLENOL) 325 MG Tab Take 1 Tablet by mouth.      Calcium Citrate-Vitamin D 315-250 MG-UNIT Tab Take 1 Tablet by mouth 2 times a day.      Multiple Vitamin (MULTI-VITAMIN) Tab Take 1 Tablet by mouth every day.      Zinc Acetate, Oral, 25 MG Cap Take 1 Capsule by mouth every day.      oxybutynin (DITROPAN) 5 MG Tab Take 1 Tablet by mouth 2 times a day as needed. 180 Tablet 3    albuterol 108 (90 Base) MCG/ACT Aero Soln inhalation aerosol Inhale 2 Puffs by mouth every 6 hours as needed for Shortness of Breath.      Probiotic Product (PROBIOTIC ACIDOPHILUS) Cap Take  by mouth every day.      Mometasone Furo-Formoterol Fum (DULERA) 200-5 MCG/ACT Aerosol Inhale  by mouth.       No current facility-administered medications for this visit.     Allergies   Allergen Reactions    Pcn [Penicillins]      Hives itching        Review of Systems ***  Constitutional: Negative for fever, chills and malaise/fatigue.   HENT: Negative for congestion.    Eyes: Negative for pain.    Respiratory: Negative for cough and shortness of breath.  Cardiovascular: Negative for leg swelling.   Gastrointestinal: Negative for nausea, vomiting, abdominal pain and diarrhea.   Genitourinary: Negative for dysuria and hematuria.   Skin: Negative for rash.   Neurological: Negative for dizziness, focal weakness  "and headaches.   Endo/Heme/Allergies: Does not bleed easily.   Psychiatric/Behavioral: Negative for depression.  The patient is not nervous/anxious.      Objective:     /72   Pulse 68   Temp 36.6 °C (97.9 °F) (Temporal)   Resp 16   Ht 1.753 m (5' 9\")   Wt 93.4 kg (206 lb)   LMP 05/17/2007   SpO2 97%   BMI 30.42 kg/m²   Body mass index is 30.42 kg/m².  Wt Readings from Last 4 Encounters:   06/27/23 93.4 kg (206 lb)   06/09/23 94.3 kg (208 lb)   06/23/22 92.5 kg (204 lb)   02/22/22 91.6 kg (202 lb)       Physical Exam:  Constitutional: Well-developed and well-nourished. Not diaphoretic. No distress.   Skin: Skin is warm and dry. No rash noted.  Head: Atraumatic without lesions.  Eyes: Conjunctivae and extraocular motions are normal. Pupils are equal, round, and reactive to light. No scleral icterus.   Ears:  External ears unremarkable. Tympanic membranes clear and intact.  Nose: Nares patent. Septum midline. Turbinates without erythema nor edema. No discharge.   Mouth/Throat: Dentition is ***. Tongue normal. Oropharynx is clear and moist. Posterior pharynx without erythema or exudates.  Neck: Supple, trachea midline. Normal range of motion. No thyromegaly present. No lymphadenopathy--cervical or supraclavicular.  Cardiovascular: Regular rate and rhythm, S1 and S2 without murmur, rubs, or gallops.  Lungs: Normal inspiratory effort, CTA bilaterally, no wheezes/rhonchi/rales  Breast: Breasts examined seated and supine. No skin changes, peau d'orange or nipple retraction. No discharge. No axillary or supraclavicular adenopathy. No masses or nodularity palpable. ***  Abdomen: Soft, non tender, and without distention. Active bowel sounds in all four quadrants. No rebound, guarding, masses or HSM.  :Perineum and external genitalia normal without rash. Vagina with {GYN VAGINAL DISCHARGE:721} discharge. Cervix without visible lesions or discharge. Bimanual exam without adnexal masses or cervical motion " tenderness. Specimen collected from transformation zone ***  Extremities: No cyanosis, clubbing, erythema, nor edema. Distal pulses intact and symmetric.   Musculoskeletal: All major joints AROM full in all directions without pain.  Neurological: Alert and oriented x 3. DTRs 2+/3 and symmetric. No cranial nerve deficit. 5/5 myotomes. Sensation intact.   Psychiatric:  Behavior, mood, and affect are appropriate.    A chaperone was offered to the patient during today's exam. {CHAPERONE:86278}    Assessment and Plan:     No diagnosis found.    Health Care Maintenance:  ***   Labs per orders  Immunizations per orders  Patient counseled about skin care, diet, supplements, prenatal vitamins, safe sex and exercise.      Follow-up: No follow-ups on file.

## 2023-09-17 DIAGNOSIS — N39.41 URGE INCONTINENCE OF URINE: ICD-10-CM

## 2023-09-19 RX ORDER — OXYBUTYNIN CHLORIDE 5 MG/1
5 TABLET ORAL 2 TIMES DAILY PRN
Qty: 180 TABLET | Refills: 3 | Status: SHIPPED | OUTPATIENT
Start: 2023-09-19

## 2023-10-09 ENCOUNTER — PATIENT MESSAGE (OUTPATIENT)
Dept: MEDICAL GROUP | Facility: PHYSICIAN GROUP | Age: 73
End: 2023-10-09
Payer: MEDICARE

## 2023-10-09 DIAGNOSIS — M81.0 AGE-RELATED OSTEOPOROSIS WITHOUT CURRENT PATHOLOGICAL FRACTURE: ICD-10-CM

## 2023-10-09 DIAGNOSIS — Z12.31 ENCOUNTER FOR SCREENING MAMMOGRAM FOR MALIGNANT NEOPLASM OF BREAST: ICD-10-CM

## 2023-10-17 ENCOUNTER — PATIENT MESSAGE (OUTPATIENT)
Dept: MEDICAL GROUP | Facility: PHYSICIAN GROUP | Age: 73
End: 2023-10-17
Payer: MEDICARE

## 2023-10-17 ENCOUNTER — HOSPITAL ENCOUNTER (OUTPATIENT)
Dept: RADIOLOGY | Facility: MEDICAL CENTER | Age: 73
End: 2023-10-17
Attending: NURSE PRACTITIONER
Payer: MEDICARE

## 2023-10-17 DIAGNOSIS — M81.0 AGE-RELATED OSTEOPOROSIS WITHOUT CURRENT PATHOLOGICAL FRACTURE: ICD-10-CM

## 2023-10-17 DIAGNOSIS — Z12.31 ENCOUNTER FOR SCREENING MAMMOGRAM FOR MALIGNANT NEOPLASM OF BREAST: ICD-10-CM

## 2023-10-17 PROCEDURE — 77080 DXA BONE DENSITY AXIAL: CPT

## 2023-10-17 PROCEDURE — 77063 BREAST TOMOSYNTHESIS BI: CPT

## 2023-10-18 RX ORDER — ALENDRONATE SODIUM 70 MG/1
70 TABLET ORAL
Qty: 12 TABLET | Refills: 3 | Status: SHIPPED | OUTPATIENT
Start: 2023-10-18

## 2023-10-19 NOTE — PROGRESS NOTES
1. Age-related osteoporosis without current pathological fracture  Worsening density on recent dexa scan. Patient already on vit D + calcium supplements. Would like to start fosamax.     - alendronate (FOSAMAX) 70 MG Tab; Take 1 Tablet by mouth every 7 days.  Dispense: 12 Tablet; Refill: 3

## 2023-11-06 ENCOUNTER — PATIENT MESSAGE (OUTPATIENT)
Dept: MEDICAL GROUP | Facility: PHYSICIAN GROUP | Age: 73
End: 2023-11-06
Payer: MEDICARE

## 2023-11-06 DIAGNOSIS — Z13.228 SCREENING FOR METABOLIC DISORDER: ICD-10-CM

## 2023-11-06 DIAGNOSIS — E55.9 VITAMIN D DEFICIENCY: ICD-10-CM

## 2023-11-06 DIAGNOSIS — L65.9 HAIR LOSS: ICD-10-CM

## 2023-11-10 RX ORDER — LANOLIN ALCOHOL/MO/W.PET/CERES
400 CREAM (GRAM) TOPICAL DAILY
Qty: 90 TABLET | Refills: 1 | Status: SHIPPED | OUTPATIENT
Start: 2023-11-10

## 2023-11-10 NOTE — PROGRESS NOTES
1. Hair loss  Wants to try folic acid supplement  - TSH; Future  - FREE THYROXINE; Future  - FOLATE; Future  - IRON/TOTAL IRON BIND; Future  - folic acid (FOLVITE) 400 MCG tablet; Take 1 Tablet by mouth every day.  Dispense: 90 Tablet; Refill: 1    2. Vitamin D deficiency  - VITAMIN D,25 HYDROXY (DEFICIENCY); Future    3. Screening for metabolic disorder  - CBC WITHOUT DIFFERENTIAL; Future  - Comp Metabolic Panel; Future  - Lipid Profile; Future  - HEMOGLOBIN A1C; Future  - TSH; Future  - FREE THYROXINE; Future

## 2023-12-07 ENCOUNTER — HOSPITAL ENCOUNTER (OUTPATIENT)
Dept: LAB | Facility: MEDICAL CENTER | Age: 73
End: 2023-12-07
Attending: NURSE PRACTITIONER
Payer: MEDICARE

## 2023-12-07 DIAGNOSIS — Z13.228 SCREENING FOR METABOLIC DISORDER: ICD-10-CM

## 2023-12-07 DIAGNOSIS — L65.9 HAIR LOSS: ICD-10-CM

## 2023-12-07 DIAGNOSIS — E55.9 VITAMIN D DEFICIENCY: ICD-10-CM

## 2023-12-07 LAB
ALBUMIN SERPL BCP-MCNC: 4 G/DL (ref 3.2–4.9)
ALBUMIN/GLOB SERPL: 1.5 G/DL
ALP SERPL-CCNC: 83 U/L (ref 30–99)
ALT SERPL-CCNC: 34 U/L (ref 2–50)
ANION GAP SERPL CALC-SCNC: 10 MMOL/L (ref 7–16)
AST SERPL-CCNC: 35 U/L (ref 12–45)
BILIRUB SERPL-MCNC: 0.3 MG/DL (ref 0.1–1.5)
BUN SERPL-MCNC: 13 MG/DL (ref 8–22)
CALCIUM ALBUM COR SERPL-MCNC: 9.6 MG/DL (ref 8.5–10.5)
CALCIUM SERPL-MCNC: 9.6 MG/DL (ref 8.5–10.5)
CHLORIDE SERPL-SCNC: 108 MMOL/L (ref 96–112)
CHOLEST SERPL-MCNC: 134 MG/DL (ref 100–199)
CO2 SERPL-SCNC: 25 MMOL/L (ref 20–33)
CREAT SERPL-MCNC: 0.62 MG/DL (ref 0.5–1.4)
ERYTHROCYTE [DISTWIDTH] IN BLOOD BY AUTOMATED COUNT: 51 FL (ref 35.9–50)
EST. AVERAGE GLUCOSE BLD GHB EST-MCNC: 100 MG/DL
FASTING STATUS PATIENT QL REPORTED: NORMAL
GFR SERPLBLD CREATININE-BSD FMLA CKD-EPI: 93 ML/MIN/1.73 M 2
GLOBULIN SER CALC-MCNC: 2.7 G/DL (ref 1.9–3.5)
GLUCOSE SERPL-MCNC: 88 MG/DL (ref 65–99)
HBA1C MFR BLD: 5.1 % (ref 4–5.6)
HCT VFR BLD AUTO: 40.4 % (ref 37–47)
HDLC SERPL-MCNC: 71 MG/DL
HGB BLD-MCNC: 12.7 G/DL (ref 12–16)
IRON SATN MFR SERPL: 11 % (ref 15–55)
IRON SERPL-MCNC: 40 UG/DL (ref 40–170)
LDLC SERPL CALC-MCNC: 51 MG/DL
MCH RBC QN AUTO: 28.4 PG (ref 27–33)
MCHC RBC AUTO-ENTMCNC: 31.4 G/DL (ref 32.2–35.5)
MCV RBC AUTO: 90.4 FL (ref 81.4–97.8)
PLATELET # BLD AUTO: 324 K/UL (ref 164–446)
PMV BLD AUTO: 11 FL (ref 9–12.9)
POTASSIUM SERPL-SCNC: 4.6 MMOL/L (ref 3.6–5.5)
PROT SERPL-MCNC: 6.7 G/DL (ref 6–8.2)
RBC # BLD AUTO: 4.47 M/UL (ref 4.2–5.4)
SODIUM SERPL-SCNC: 143 MMOL/L (ref 135–145)
TIBC SERPL-MCNC: 380 UG/DL (ref 250–450)
TRIGL SERPL-MCNC: 59 MG/DL (ref 0–149)
UIBC SERPL-MCNC: 340 UG/DL (ref 110–370)
WBC # BLD AUTO: 6 K/UL (ref 4.8–10.8)

## 2023-12-07 PROCEDURE — 83036 HEMOGLOBIN GLYCOSYLATED A1C: CPT | Mod: GA

## 2023-12-07 PROCEDURE — 36415 COLL VENOUS BLD VENIPUNCTURE: CPT

## 2023-12-07 PROCEDURE — 83540 ASSAY OF IRON: CPT

## 2023-12-07 PROCEDURE — 85027 COMPLETE CBC AUTOMATED: CPT

## 2023-12-07 PROCEDURE — 82306 VITAMIN D 25 HYDROXY: CPT

## 2023-12-07 PROCEDURE — 83550 IRON BINDING TEST: CPT

## 2023-12-07 PROCEDURE — 84439 ASSAY OF FREE THYROXINE: CPT

## 2023-12-07 PROCEDURE — 80061 LIPID PANEL: CPT | Mod: GA

## 2023-12-07 PROCEDURE — 80053 COMPREHEN METABOLIC PANEL: CPT

## 2023-12-07 PROCEDURE — 82746 ASSAY OF FOLIC ACID SERUM: CPT

## 2023-12-07 PROCEDURE — 84443 ASSAY THYROID STIM HORMONE: CPT

## 2023-12-08 LAB
25(OH)D3 SERPL-MCNC: 46 NG/ML (ref 30–100)
FOLATE SERPL-MCNC: 29.3 NG/ML
T4 FREE SERPL-MCNC: 1.23 NG/DL (ref 0.93–1.7)
TSH SERPL DL<=0.005 MIU/L-ACNC: 1.94 UIU/ML (ref 0.38–5.33)

## 2024-01-17 ENCOUNTER — HOSPITAL ENCOUNTER (OUTPATIENT)
Dept: RADIOLOGY | Facility: MEDICAL CENTER | Age: 74
End: 2024-01-17
Attending: NURSE PRACTITIONER
Payer: MEDICARE

## 2024-01-17 ENCOUNTER — OFFICE VISIT (OUTPATIENT)
Dept: MEDICAL GROUP | Facility: PHYSICIAN GROUP | Age: 74
End: 2024-01-17
Payer: MEDICARE

## 2024-01-17 VITALS
HEIGHT: 69 IN | WEIGHT: 193 LBS | TEMPERATURE: 98.3 F | RESPIRATION RATE: 16 BRPM | SYSTOLIC BLOOD PRESSURE: 122 MMHG | OXYGEN SATURATION: 98 % | DIASTOLIC BLOOD PRESSURE: 70 MMHG | BODY MASS INDEX: 28.58 KG/M2 | HEART RATE: 98 BPM

## 2024-01-17 DIAGNOSIS — R05.1 ACUTE COUGH: ICD-10-CM

## 2024-01-17 DIAGNOSIS — J45.20 MILD INTERMITTENT ASTHMA WITHOUT COMPLICATION: ICD-10-CM

## 2024-01-17 LAB
FLUAV RNA SPEC QL NAA+PROBE: NEGATIVE
FLUBV RNA SPEC QL NAA+PROBE: NEGATIVE
RSV RNA SPEC QL NAA+PROBE: NEGATIVE
S PYO DNA SPEC NAA+PROBE: NOT DETECTED
SARS-COV-2 RNA RESP QL NAA+PROBE: NEGATIVE

## 2024-01-17 PROCEDURE — 3078F DIAST BP <80 MM HG: CPT | Performed by: NURSE PRACTITIONER

## 2024-01-17 PROCEDURE — 99213 OFFICE O/P EST LOW 20 MIN: CPT | Performed by: NURSE PRACTITIONER

## 2024-01-17 PROCEDURE — 87651 STREP A DNA AMP PROBE: CPT | Performed by: NURSE PRACTITIONER

## 2024-01-17 PROCEDURE — 0241U POCT CEPHEID COV-2, FLU A/B, RSV - PCR: CPT | Performed by: NURSE PRACTITIONER

## 2024-01-17 PROCEDURE — 3074F SYST BP LT 130 MM HG: CPT | Performed by: NURSE PRACTITIONER

## 2024-01-17 PROCEDURE — 71046 X-RAY EXAM CHEST 2 VIEWS: CPT

## 2024-01-17 RX ORDER — METHYLPREDNISOLONE 4 MG/1
TABLET ORAL
Qty: 21 TABLET | Refills: 0 | Status: SHIPPED | OUTPATIENT
Start: 2024-01-17

## 2024-01-17 ASSESSMENT — PATIENT HEALTH QUESTIONNAIRE - PHQ9: CLINICAL INTERPRETATION OF PHQ2 SCORE: 0

## 2024-01-17 ASSESSMENT — FIBROSIS 4 INDEX: FIB4 SCORE: 1.37

## 2024-01-18 NOTE — PROGRESS NOTES
Chief Complaint   Patient presents with    Pharyngitis     Cough x3days       HISTORY OF PRESENT ILLNESS: Betty Natarajan is a 73 y.o. female established patient who presents today to discuss:  - productive cough the past 3 days; yellow-green phlegm. No fever, CP, sinus congestion; hx asthma managed by pulmonology on QD Dulera; used albuterol 3x yesterday. She is taking AM/PM nyquil which is helping with the cough at night especially.    - her sister was in the hospital for RSV a few days ago     Current Outpatient Medications on File Prior to Visit   Medication Sig Dispense Refill    folic acid (FOLVITE) 400 MCG tablet Take 1 Tablet by mouth every day. 90 Tablet 1    oxybutynin (DITROPAN) 5 MG Tab Take 1 Tablet by mouth 2 times a day as needed (urinary incontinence). 180 Tablet 3    vitamin E (VITAMIN SUPPLEMENT E-1000) 1000 units (450 mg) Cap VEGETABLE POWDER SUPPLEMENT: Dissolve and drink as directed  for supplement.      acetaminophen (TYLENOL) 325 MG Tab Take 1 Tablet by mouth.      Calcium Citrate-Vitamin D 315-250 MG-UNIT Tab Take 1 Tablet by mouth 2 times a day.      Multiple Vitamin (MULTI-VITAMIN) Tab Take 1 Tablet by mouth every day.      albuterol 108 (90 Base) MCG/ACT Aero Soln inhalation aerosol Inhale 2 Puffs by mouth every 6 hours as needed for Shortness of Breath.      Probiotic Product (PROBIOTIC ACIDOPHILUS) Cap Take  by mouth every day.      Mometasone Furo-Formoterol Fum (DULERA) 200-5 MCG/ACT Aerosol Inhale  by mouth.      alendronate (FOSAMAX) 70 MG Tab Take 1 Tablet by mouth every 7 days. 12 Tablet 3    Zinc Acetate, Oral, 25 MG Cap Take 1 Capsule by mouth every day.       No current facility-administered medications on file prior to visit.       has a past medical history of Asthma, Bronchitis, Depression, History of kidney stones (7/25/2017), Influenza, Pap smear, Positive PPD, and Tonsillitis.     Patient Active Problem List   Diagnosis    Vitamin D deficiency    Mild intermittent asthma  "without complication    Obesity (BMI 30-39.9)    STEVEN (obstructive sleep apnea)    Urge incontinence of urine    Age-related osteoporosis without current pathological fracture    Memory loss    Gastroesophageal reflux disease without esophagitis    Preop examination    Chronic pain of right hip    Primary insomnia    COVID-19        Allergies:Pcn [penicillins]    Health Maintenance: deferred  Review of Systems -included above  Exam:   /70   Pulse 98   Temp 36.8 °C (98.3 °F) (Temporal)   Resp 16   Ht 1.753 m (5' 9\")   Wt 87.5 kg (193 lb)   SpO2 98%   Body mass index is 28.5 kg/m².   Physical Exam  Constitutional:       Appearance: Normal appearance.   HENT:      Head: Normocephalic and atraumatic.      Right Ear: Tympanic membrane, ear canal and external ear normal.      Left Ear: Tympanic membrane, ear canal and external ear normal.      Nose: Nose normal. No congestion or rhinorrhea.      Mouth/Throat:      Mouth: Mucous membranes are moist.      Pharynx: Oropharynx is clear.   Eyes:      Conjunctiva/sclera: Conjunctivae normal.      Pupils: Pupils are equal, round, and reactive to light.   Cardiovascular:      Rate and Rhythm: Normal rate and regular rhythm.   Pulmonary:      Effort: Pulmonary effort is normal.      Breath sounds: Normal breath sounds.   Musculoskeletal:      Right lower leg: No edema.      Left lower leg: No edema.   Skin:     General: Skin is warm and dry.   Neurological:      Mental Status: She is alert.        Latest Reference Range & Units 01/17/24 17:36 01/17/24 17:39   Influenza virus A RNA Negative, Invalid   Negative   Influenza virus B, PCR Negative, Invalid   Negative   RSV, PCR Negative, Invalid   Negative   SARS-CoV-2 by PCR Negative, Invalid   Negative   POC Group A Strep, PCR Not Detected, Invalid  Not Detected      Cxray 1/17/2024  IMPRESSION:  1.  No evidence of acute cardiopulmonary process    Assessment/Plan:  1. Acute cough  2. Mild intermittent asthma without " complication  72 y/o F with hx of asthma on daily maintenance dulera inhaler here with productive cough x 3 days; denies fever; some SOB with the cough and using prn albuterol. BS are clear on exam today, cxray was normal. COVID/RSV,FLU testing were negative today, rapid strep also negative. Will continue with maintenance inhaler + prn albuterol, add steroid pack; plan to repeat cxray in 1 week if no improvement.     - POCT CEPHEID COV-2, FLU A/B, RSV - PCR  - POCT CEPHEID GROUP A STREP - PCR  - DX-CHEST-2 VIEWS; Future      Follow up:  Return in about 2 weeks (around 1/31/2024).    Educated in proper administration of medication(s) ordered today including safety, possible SE, risks, benefits, rationale and alternatives to therapy.       Please note that this dictation was created using voice recognition software. I have made every reasonable attempt to correct obvious errors, but I expect that there are errors of grammar and possibly content that I did not discover before finalizing the note.

## 2024-05-24 ENCOUNTER — HOSPITAL ENCOUNTER (OUTPATIENT)
Dept: LAB | Facility: MEDICAL CENTER | Age: 74
End: 2024-05-24
Attending: PHYSICIAN ASSISTANT
Payer: MEDICARE

## 2024-05-24 ENCOUNTER — DOCUMENTATION (OUTPATIENT)
Dept: HEALTH INFORMATION MANAGEMENT | Facility: OTHER | Age: 74
End: 2024-05-24
Payer: MEDICARE

## 2024-05-24 LAB
ALBUMIN SERPL BCP-MCNC: 4.1 G/DL (ref 3.2–4.9)
ALBUMIN/GLOB SERPL: 1.6 G/DL
ALP SERPL-CCNC: 82 U/L (ref 30–99)
ALT SERPL-CCNC: 27 U/L (ref 2–50)
ANION GAP SERPL CALC-SCNC: 12 MMOL/L (ref 7–16)
AST SERPL-CCNC: 38 U/L (ref 12–45)
BASOPHILS # BLD AUTO: 1.4 % (ref 0–1.8)
BASOPHILS # BLD: 0.08 K/UL (ref 0–0.12)
BILIRUB SERPL-MCNC: 0.5 MG/DL (ref 0.1–1.5)
BUN SERPL-MCNC: 15 MG/DL (ref 8–22)
CALCIUM ALBUM COR SERPL-MCNC: 9.4 MG/DL (ref 8.5–10.5)
CALCIUM SERPL-MCNC: 9.5 MG/DL (ref 8.5–10.5)
CHLORIDE SERPL-SCNC: 104 MMOL/L (ref 96–112)
CHOLEST SERPL-MCNC: 142 MG/DL (ref 100–199)
CO2 SERPL-SCNC: 25 MMOL/L (ref 20–33)
CREAT SERPL-MCNC: 0.54 MG/DL (ref 0.5–1.4)
EOSINOPHIL # BLD AUTO: 0.18 K/UL (ref 0–0.51)
EOSINOPHIL NFR BLD: 3.3 % (ref 0–6.9)
ERYTHROCYTE [DISTWIDTH] IN BLOOD BY AUTOMATED COUNT: 49.6 FL (ref 35.9–50)
FASTING STATUS PATIENT QL REPORTED: NORMAL
GFR SERPLBLD CREATININE-BSD FMLA CKD-EPI: 96 ML/MIN/1.73 M 2
GLOBULIN SER CALC-MCNC: 2.6 G/DL (ref 1.9–3.5)
GLUCOSE SERPL-MCNC: 91 MG/DL (ref 65–99)
HCT VFR BLD AUTO: 43 % (ref 37–47)
HDLC SERPL-MCNC: 67 MG/DL
HGB BLD-MCNC: 13.6 G/DL (ref 12–16)
IMM GRANULOCYTES # BLD AUTO: 0 K/UL (ref 0–0.11)
IMM GRANULOCYTES NFR BLD AUTO: 0 % (ref 0–0.9)
LDLC SERPL CALC-MCNC: 64 MG/DL
LYMPHOCYTES # BLD AUTO: 2.53 K/UL (ref 1–4.8)
LYMPHOCYTES NFR BLD: 45.8 % (ref 22–41)
MCH RBC QN AUTO: 28.6 PG (ref 27–33)
MCHC RBC AUTO-ENTMCNC: 31.6 G/DL (ref 32.2–35.5)
MCV RBC AUTO: 90.5 FL (ref 81.4–97.8)
MONOCYTES # BLD AUTO: 0.44 K/UL (ref 0–0.85)
MONOCYTES NFR BLD AUTO: 8 % (ref 0–13.4)
NEUTROPHILS # BLD AUTO: 2.3 K/UL (ref 1.82–7.42)
NEUTROPHILS NFR BLD: 41.5 % (ref 44–72)
NRBC # BLD AUTO: 0 K/UL
NRBC BLD-RTO: 0 /100 WBC (ref 0–0.2)
PLATELET # BLD AUTO: 320 K/UL (ref 164–446)
PMV BLD AUTO: 11.2 FL (ref 9–12.9)
POTASSIUM SERPL-SCNC: 4.1 MMOL/L (ref 3.6–5.5)
PROT SERPL-MCNC: 6.7 G/DL (ref 6–8.2)
RBC # BLD AUTO: 4.75 M/UL (ref 4.2–5.4)
SODIUM SERPL-SCNC: 141 MMOL/L (ref 135–145)
TRIGL SERPL-MCNC: 55 MG/DL (ref 0–149)
TSH SERPL DL<=0.005 MIU/L-ACNC: 1.75 UIU/ML (ref 0.38–5.33)
WBC # BLD AUTO: 5.5 K/UL (ref 4.8–10.8)

## 2024-08-26 SDOH — ECONOMIC STABILITY: FOOD INSECURITY: WITHIN THE PAST 12 MONTHS, YOU WORRIED THAT YOUR FOOD WOULD RUN OUT BEFORE YOU GOT MONEY TO BUY MORE.: NEVER TRUE

## 2024-08-26 SDOH — HEALTH STABILITY: PHYSICAL HEALTH: ON AVERAGE, HOW MANY MINUTES DO YOU ENGAGE IN EXERCISE AT THIS LEVEL?: 30 MIN

## 2024-08-26 SDOH — HEALTH STABILITY: PHYSICAL HEALTH: ON AVERAGE, HOW MANY DAYS PER WEEK DO YOU ENGAGE IN MODERATE TO STRENUOUS EXERCISE (LIKE A BRISK WALK)?: 3 DAYS

## 2024-08-26 SDOH — ECONOMIC STABILITY: INCOME INSECURITY: HOW HARD IS IT FOR YOU TO PAY FOR THE VERY BASICS LIKE FOOD, HOUSING, MEDICAL CARE, AND HEATING?: NOT HARD AT ALL

## 2024-08-26 SDOH — ECONOMIC STABILITY: INCOME INSECURITY: IN THE LAST 12 MONTHS, WAS THERE A TIME WHEN YOU WERE NOT ABLE TO PAY THE MORTGAGE OR RENT ON TIME?: NO

## 2024-08-26 SDOH — ECONOMIC STABILITY: FOOD INSECURITY: WITHIN THE PAST 12 MONTHS, THE FOOD YOU BOUGHT JUST DIDN'T LAST AND YOU DIDN'T HAVE MONEY TO GET MORE.: NEVER TRUE

## 2024-08-26 ASSESSMENT — LIFESTYLE VARIABLES
AUDIT-C TOTAL SCORE: 2
HOW OFTEN DO YOU HAVE A DRINK CONTAINING ALCOHOL: 2-4 TIMES A MONTH
SKIP TO QUESTIONS 9-10: 1
HOW MANY STANDARD DRINKS CONTAINING ALCOHOL DO YOU HAVE ON A TYPICAL DAY: 1 OR 2
HOW OFTEN DO YOU HAVE SIX OR MORE DRINKS ON ONE OCCASION: NEVER

## 2024-08-26 ASSESSMENT — SOCIAL DETERMINANTS OF HEALTH (SDOH)
HOW OFTEN DO YOU ATTENT MEETINGS OF THE CLUB OR ORGANIZATION YOU BELONG TO?: MORE THAN 4 TIMES PER YEAR
HOW OFTEN DO YOU HAVE SIX OR MORE DRINKS ON ONE OCCASION: NEVER
HOW OFTEN DO YOU HAVE A DRINK CONTAINING ALCOHOL: 2-4 TIMES A MONTH
IN A TYPICAL WEEK, HOW MANY TIMES DO YOU TALK ON THE PHONE WITH FAMILY, FRIENDS, OR NEIGHBORS?: MORE THAN THREE TIMES A WEEK
HOW OFTEN DO YOU GET TOGETHER WITH FRIENDS OR RELATIVES?: MORE THAN THREE TIMES A WEEK
HOW HARD IS IT FOR YOU TO PAY FOR THE VERY BASICS LIKE FOOD, HOUSING, MEDICAL CARE, AND HEATING?: NOT HARD AT ALL
HOW OFTEN DO YOU GET TOGETHER WITH FRIENDS OR RELATIVES?: MORE THAN THREE TIMES A WEEK
DO YOU BELONG TO ANY CLUBS OR ORGANIZATIONS SUCH AS CHURCH GROUPS UNIONS, FRATERNAL OR ATHLETIC GROUPS, OR SCHOOL GROUPS?: YES
WITHIN THE PAST 12 MONTHS, YOU WORRIED THAT YOUR FOOD WOULD RUN OUT BEFORE YOU GOT THE MONEY TO BUY MORE: NEVER TRUE
IN A TYPICAL WEEK, HOW MANY TIMES DO YOU TALK ON THE PHONE WITH FAMILY, FRIENDS, OR NEIGHBORS?: MORE THAN THREE TIMES A WEEK
IN THE PAST 12 MONTHS, HAS THE ELECTRIC, GAS, OIL, OR WATER COMPANY THREATENED TO SHUT OFF SERVICE IN YOUR HOME?: NO
DO YOU BELONG TO ANY CLUBS OR ORGANIZATIONS SUCH AS CHURCH GROUPS UNIONS, FRATERNAL OR ATHLETIC GROUPS, OR SCHOOL GROUPS?: YES
HOW OFTEN DO YOU ATTEND CHURCH OR RELIGIOUS SERVICES?: MORE THAN 4 TIMES PER YEAR
HOW MANY DRINKS CONTAINING ALCOHOL DO YOU HAVE ON A TYPICAL DAY WHEN YOU ARE DRINKING: 1 OR 2
HOW OFTEN DO YOU ATTEND CHURCH OR RELIGIOUS SERVICES?: MORE THAN 4 TIMES PER YEAR
HOW OFTEN DO YOU ATTENT MEETINGS OF THE CLUB OR ORGANIZATION YOU BELONG TO?: MORE THAN 4 TIMES PER YEAR

## 2024-08-29 ENCOUNTER — APPOINTMENT (OUTPATIENT)
Dept: MEDICAL GROUP | Facility: PHYSICIAN GROUP | Age: 74
End: 2024-08-29
Payer: MEDICARE

## 2024-08-29 VITALS
TEMPERATURE: 98.1 F | DIASTOLIC BLOOD PRESSURE: 78 MMHG | HEART RATE: 71 BPM | WEIGHT: 189.8 LBS | BODY MASS INDEX: 28.11 KG/M2 | OXYGEN SATURATION: 94 % | HEIGHT: 69 IN | SYSTOLIC BLOOD PRESSURE: 124 MMHG | RESPIRATION RATE: 18 BRPM

## 2024-08-29 DIAGNOSIS — J45.20 MILD INTERMITTENT ASTHMA WITHOUT COMPLICATION: ICD-10-CM

## 2024-08-29 DIAGNOSIS — R41.3 MEMORY LOSS: ICD-10-CM

## 2024-08-29 DIAGNOSIS — M81.0 AGE-RELATED OSTEOPOROSIS WITHOUT CURRENT PATHOLOGICAL FRACTURE: ICD-10-CM

## 2024-08-29 PROCEDURE — 3078F DIAST BP <80 MM HG: CPT

## 2024-08-29 PROCEDURE — 99214 OFFICE O/P EST MOD 30 MIN: CPT

## 2024-08-29 PROCEDURE — 3074F SYST BP LT 130 MM HG: CPT

## 2024-08-29 RX ORDER — ERGOCALCIFEROL 1.25 MG/1
CAPSULE, LIQUID FILLED ORAL
COMMUNITY

## 2024-08-29 ASSESSMENT — FIBROSIS 4 INDEX: FIB4 SCORE: 1.71
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
## 2024-09-10 ENCOUNTER — HOSPITAL ENCOUNTER (OUTPATIENT)
Dept: LAB | Facility: MEDICAL CENTER | Age: 74
End: 2024-09-10
Payer: MEDICARE

## 2024-09-10 DIAGNOSIS — R41.3 MEMORY LOSS: ICD-10-CM

## 2024-09-10 LAB
CRP SERPL HS-MCNC: <0.3 MG/DL (ref 0–0.75)
ERYTHROCYTE [SEDIMENTATION RATE] IN BLOOD BY WESTERGREN METHOD: 9 MM/HOUR (ref 0–25)
PHOSPHATE SERPL-MCNC: 4 MG/DL (ref 2.5–4.5)
VIT B12 SERPL-MCNC: >4000 PG/ML (ref 211–911)

## 2024-09-10 PROCEDURE — 84100 ASSAY OF PHOSPHORUS: CPT

## 2024-09-10 PROCEDURE — 82607 VITAMIN B-12: CPT

## 2024-09-10 PROCEDURE — 36415 COLL VENOUS BLD VENIPUNCTURE: CPT

## 2024-09-10 PROCEDURE — 85652 RBC SED RATE AUTOMATED: CPT

## 2024-09-10 PROCEDURE — 86140 C-REACTIVE PROTEIN: CPT

## 2024-09-10 PROCEDURE — 84425 ASSAY OF VITAMIN B-1: CPT

## 2024-09-15 LAB — VIT B1 BLD-MCNC: 193 NMOL/L (ref 70–180)

## 2024-10-22 ENCOUNTER — HOSPITAL ENCOUNTER (OUTPATIENT)
Dept: RADIOLOGY | Facility: MEDICAL CENTER | Age: 74
End: 2024-10-22
Payer: MEDICARE

## 2024-10-22 ENCOUNTER — APPOINTMENT (OUTPATIENT)
Dept: RADIOLOGY | Facility: MEDICAL CENTER | Age: 74
End: 2024-10-22
Payer: MEDICARE

## 2024-10-22 DIAGNOSIS — R41.3 MEMORY LOSS: ICD-10-CM

## 2024-10-22 PROCEDURE — 70551 MRI BRAIN STEM W/O DYE: CPT

## 2024-10-29 ENCOUNTER — APPOINTMENT (OUTPATIENT)
Dept: RADIOLOGY | Facility: MEDICAL CENTER | Age: 74
End: 2024-10-29
Payer: MEDICARE

## 2024-10-29 DIAGNOSIS — Z12.31 VISIT FOR SCREENING MAMMOGRAM: ICD-10-CM

## 2024-11-07 ENCOUNTER — HOSPITAL ENCOUNTER (OUTPATIENT)
Dept: RADIOLOGY | Facility: MEDICAL CENTER | Age: 74
End: 2024-11-07
Payer: MEDICARE

## 2024-11-07 DIAGNOSIS — Z12.31 VISIT FOR SCREENING MAMMOGRAM: ICD-10-CM

## 2024-11-07 PROCEDURE — 77067 SCR MAMMO BI INCL CAD: CPT

## 2025-03-31 ENCOUNTER — OFFICE VISIT (OUTPATIENT)
Dept: NEUROLOGY | Facility: MEDICAL CENTER | Age: 75
End: 2025-03-31
Attending: PSYCHIATRY & NEUROLOGY
Payer: MEDICARE

## 2025-03-31 VITALS
OXYGEN SATURATION: 97 % | TEMPERATURE: 97.7 F | SYSTOLIC BLOOD PRESSURE: 122 MMHG | HEART RATE: 75 BPM | DIASTOLIC BLOOD PRESSURE: 80 MMHG | RESPIRATION RATE: 16 BRPM | BODY MASS INDEX: 29.81 KG/M2 | WEIGHT: 201.28 LBS | HEIGHT: 69 IN

## 2025-03-31 DIAGNOSIS — E55.9 VITAMIN D DEFICIENCY: ICD-10-CM

## 2025-03-31 DIAGNOSIS — E66.3 OVERWEIGHT (BMI 25.0-29.9): ICD-10-CM

## 2025-03-31 DIAGNOSIS — G47.33 OBSTRUCTIVE SLEEP APNEA OF ADULT: ICD-10-CM

## 2025-03-31 DIAGNOSIS — G31.84 MILD COGNITIVE IMPAIRMENT: Primary | ICD-10-CM

## 2025-03-31 DIAGNOSIS — R06.83 PRIMARY SNORING: ICD-10-CM

## 2025-03-31 PROCEDURE — 99212 OFFICE O/P EST SF 10 MIN: CPT | Performed by: PSYCHIATRY & NEUROLOGY

## 2025-03-31 RX ORDER — OXYBUTYNIN CHLORIDE 5 MG/1
5 TABLET ORAL DAILY
COMMUNITY

## 2025-03-31 RX ORDER — ALENDRONATE SODIUM 35 MG/1
35 TABLET ORAL
COMMUNITY

## 2025-03-31 ASSESSMENT — PATIENT HEALTH QUESTIONNAIRE - PHQ9: CLINICAL INTERPRETATION OF PHQ2 SCORE: 0

## 2025-03-31 ASSESSMENT — FIBROSIS 4 INDEX: FIB4 SCORE: 1.71

## 2025-03-31 NOTE — PROGRESS NOTES
"Reason for Neurology Consult:  concern for changes in memory and some change in texting abilities.    History of present illness:    Betty Natarajan 74 y.o. right handed woman who grew up in [facility] and graduated from [employer] and went to the [employer] in [place] and was an Air traffic Control x 23 years. She retired 20  years ago (age 54).     about 20 years- lives alone.    Problem List Reviewed.  Neuriva- 6-8 months (once per month)    Betty describes for the last 1 to 1.5 years that she was noticing infrequently that she would forget to use her inhaler multiple times per week mainly in the morning hours .   She has adapted/compensated for this issue  by placing her inhaler in a specific location.  She has also noticed for the last 3 months that she can forget a single word (rarely a second word)- \"to,and,at\" and this seems to occur almost every time that she types a text.    She has infrequently noticed difficulty thinking of a word  (last night she could not up with word \"flash guard\" or name and this occurs a few times a day.    Emailing - she emails daily to others and but has noticed any worsening of this activity in terms of syntax or context of her email in the recent year or so.    She has noticed that over the last year that she can go into her office to  something in that room- \"sometimes she can't immediately grab what she intended to get\" but this is not increasing frequency in her view in the last 6 months or so.    Bills and finances seem to her well organized and maintained in the last 12 months.    She watches/podcast (3-4 per day) and this a hobby and interest of her's for well over several years and has not recognized any problems understanding what she is listening to.    Driving ability seems well maintained in the last 12 months or so.  No accidents or incidents driving or any self reported accidents to me today in the last 12 months or so.    There is no history of " "discrete concussion events, seizure(s)/epilepsy or stroke(s)/TIA episodes known or formally documented.    IADLs reviewed today and all seem intact.      No involuntary movements of body/limbs, dysarthria,dysphagia have been noticed in the recent months.    She firmly denies feeling or being depressed,anxious nor having any  delusions,paranoia,hallucinations, behavioral or personality changes in the last 3 to 6 months.    Sleep: averages 6-7  hours most nights of the week in the recent months.     Denies REM Sleep Behavioral type symptoms such as vivid dreaming.  Rare awakenings to urinate (at most 2 times per night in the recent few months).     Denies Excessive Day Time Sleepiness or need for daily or frequent napping in the recent few months.      None smoker and infrequent alcohol use in adult life.    Family Hx:    Mother:  at age 31 (MVA)  Father:  at age 81 long term smoker and (Alcoholism)> ischemic stroke at age 79.  4 Brothers:  2  at age 45 - 1 with cancer (\"Down Yoselin\") and another age 45- Hematemesis  1 brother: smoker (long term)- CAD event around age 62  1 brother: age 66:  HTN> ICH suspected.          Patient Active Problem List    Diagnosis Date Noted    COVID-19 2022    Primary insomnia 2021    Preop examination 2021    Chronic pain of right hip 2021    Gastroesophageal reflux disease without esophagitis 2021    Memory loss 2019    Age-related osteoporosis without current pathological fracture 2018    Urge incontinence of urine 2017    STEVEN (obstructive sleep apnea) 2017    Obesity (BMI 30-39.9) 2016    Mild intermittent asthma without complication 2013    Vitamin D deficiency 2010       Past medical history:   Past Medical History:   Diagnosis Date    Asthma     Bronchitis     Depression     History of kidney stones 2017    Influenza     Pap smear     Positive PPD     Tonsillitis        Past surgical history: "   Past Surgical History:   Procedure Laterality Date    COLONOSCOPY  2/17/2016    internal hemorrhoids    HEMORRHOIDECTOMY  5/08    COLONOSCOPY  2005    normal    GASTRIC BYPASS LAPAROSCOPIC  2001    NODULE EXCISION      Performed by EDELMIRA MONTE at [facility]    HI REMV 2ND CATARACT,CORN-SCLER SECTN      TONSILLECTOMY      US-NEEDLE CORE BX-BREAST PANEL           Social history:   Social History     Socioeconomic History    Marital status:      Spouse name: Not on file    Number of children: Not on file    Years of education: Not on file    Highest education level: Associate degree: occupational, technical, or vocational program   Occupational History    Occupation: retired     Employer: RETIRED   Tobacco Use    Smoking status: Never    Smokeless tobacco: Never   Vaping Use    Vaping status: Never Used   Substance and Sexual Activity    Alcohol use: Yes     Alcohol/week: 0.0 oz     Comment: occasional    Drug use: No    Sexual activity: Not Currently   Other Topics Concern    Not on file   Social History Narrative    Not on file     Social Drivers of Health     Financial Resource Strain: Low Risk  (8/26/2024)    Overall Financial Resource Strain (CARDIA)     Difficulty of Paying Living Expenses: Not hard at all   Food Insecurity: No Food Insecurity (8/26/2024)    Hunger Vital Sign     Worried About Running Out of Food in the Last Year: Never true     Ran Out of Food in the Last Year: Never true   Transportation Needs: No Transportation Needs (8/26/2024)    PRAPARE - Transportation     Lack of Transportation (Medical): No     Lack of Transportation (Non-Medical): No   Physical Activity: Insufficiently Active (8/26/2024)    Exercise Vital Sign     Days of Exercise per Week: 3 days     Minutes of Exercise per Session: 30 min   Stress: No Stress Concern Present (8/26/2024)    [employer] [place] [employer] - Occupational Stress Questionnaire     Feeling of Stress : Only a little   Social  Connections: Moderately Integrated (8/26/2024)    Social Connection and Isolation Panel [NHANES]     Frequency of Communication with Friends and Family: More than three times a week     Frequency of Social Gatherings with Friends and Family: More than three times a week     Attends Zoroastrianism Services: More than 4 times per year     Active Member of Clubs or Organizations: Yes     Attends Club or Organization Meetings: More than 4 times per year     Marital Status:    Intimate Partner Violence: Not on file   Housing Stability: Low Risk  (8/26/2024)    Housing Stability Vital Sign     Unable to Pay for Housing in the Last Year: No     Number of Times Moved in the Last Year: 0     Homeless in the Last Year: No       Family history:   Family History   Problem Relation Age of Onset    Diabetes Father     Hypertension Father     Genitourinary () Problems Father         renal failure on dialysis    Cancer Brother         brain tumor    Heart Attack Brother     No Known Problems Brother     No Known Problems Brother     Breast Cancer Maternal Aunt     Cancer Maternal Aunt     Breast Cancer Daughter     Sleep Apnea Neg Hx          Current medications:   Current Outpatient Medications   Medication    vitamin D2, Ergocalciferol, (DRISDOL) 1.25 MG (47484 UT) Cap capsule    vitamin E (VITAMIN SUPPLEMENT E-1000) 1000 units (450 mg) Cap    acetaminophen (TYLENOL) 325 MG Tab    Calcium Citrate-Vitamin D 315-250 MG-UNIT Tab    Multiple Vitamin (MULTI-VITAMIN) Tab    albuterol 108 (90 Base) MCG/ACT Aero Soln inhalation aerosol    Probiotic Product (PROBIOTIC ACIDOPHILUS) Cap    Mometasone Furo-Formoterol Fum (DULERA) 200-5 MCG/ACT Aerosol    folic acid (FOLVITE) 400 MCG tablet     No current facility-administered medications for this visit.       Medication Allergy:  Allergies   Allergen Reactions    Pcn [Penicillins]      Hives itching            Physical examination:   Vitals:    03/31/25 1138   BP: 122/80   BP Location:  "Left arm   Patient Position: Sitting   BP Cuff Size: Adult   Pulse: 75   Resp: 16   Temp: 36.5 °C (97.7 °F)   TempSrc: Temporal   SpO2: 97%   Weight: 91.3 kg (201 lb 4.5 oz)   Height: 1.753 m (5' 9\")       Normal cephalic atraumatic.  There is full range of movement around the neck in all directions without restrictions or discrete pain evoked triggers.  No lower extremity edema.      Neurological  Exam:      [employer] Cognitive Assessment (MOCA) Version 7.1    Years of Education: High School Graduate    TOTAL SCORE: 27/30  (to be scanned into the MEDIA section in the E.M.R.)      Mental status: Awake, alert and fully oriented to person, place, time, and situation. Normal attention and concentration.  Did not appear/act combative,irritable,anxious,paranoid/delusional or aggressive to or with me.    Speech and language: Speech is fluent without errors, clear, intact to repetition, and intact to naming.     Follows 3 step motor commands in sequence without significant delay and correctly.    Cranial nerve exam:  II: Pupils are equally round and reactive to light. Visual fields are intact by confrontation.  III, IV, VI: EOMI, no diplopia, no ptosis.  V: Sensation to light touch is normal over V1-3 distributions bilaterally.  .  VII: Facial movements are symmetrical. There is no facial droop. .  VIII: Hearing intact to soft speech and finger rub bilaterally  IX: Palate elevates symmetrically, uvula is midline. Dysarthria is not present.  XI: Shoulder shrug are symmetrical and strong.   XII: Tongue protrudes midline.      Motor exam:  Muscle tone is normal in all 4 limbs. and No abnormal movements appreciated.    Muscle strength:    Neck Flexors/Extensors: 5/5       Right  Left  Deltoid   5/5  5/5      Biceps   5/5  5/5  Triceps              5/5  5/5   Wrist extensors 5/5  5/5  Wrist flexors  5/5  5/5     5/5  5/5  Interossei  5/5  5/5  Thenar (APB)  5/5  5/5   Hip " flexors  5/5  5/5  Quadriceps  5/5  5/5    Hamstrings  5/5  5/5  Dorsiflexors  5/5  5/5  Plantarflexors  5/5  5/5  Toe extension  5/5  5/5      Sensory exam:    Vibratory: 6-8 seconds at the toes and 10-12 seconds at ankles (symmetrical).  Proprioception: normal at the great toes.    Reflexes:       Right  Left  Biceps   2/4  2/4  Triceps              2/4  2/4  Brachioradialis 2/4  2/4  Knee jerk  2/4  2/4  Ankle jerk  2/4  2/4     Frontal release signs are absent    bilaterally toes are downgoing to plantar stimulation..    Coordination (finger-to-nose, heel/knee/shin, rapid alternating movements) was normal.     There was no ataxia, no tremors, and no dysmetria.     Station and gait > easily stands up from exam chair without retropulsion,veering,leaning,swaying (to either side).       Labs and Tests:    5/2017:    CC:  Here for f/u sleep issues as listed below     HPI:      Betty presents today for follow up obstructive sleep apnea with sleep study results.  PSG from 4/2017 indicated an AHI of 10.6 that increases to 25.5 while supine and low oxygen of 83%.  She did not meet split night criteria.  Treatment options were discussed with patient including CPAP treatment versus in lab titration, dental appliance, UPPP surgery, and behavioral modifications.  Patient is not interested in treatment at this time. She is upset she did not get to try mask many of the study and I discussed her protocol with her. She would like to think about her decision and potentially try the CPAP machine in 1-2 years. Untreated STEVEN pathophysiology reviewed with patient including cardiac and neurological risk factors. Patient would like to try losing weight. She gained approximately 65 pounds over a year time after she found her brother dead. She was encourage support groups but she planned to start going to in the near future.  Patient is currently sleeping 5-6 hours per night with 1x nighttime awakenings. They have no trouble falling  asleep and takes Melatonin with benefit every night.    They do feel refreshed in the morning and denies morning H/A. They occasionally feel tired throughout the day and rarely takes naps 1x per week for appx 20-30minutes long.  Patient reports snoring.  Denies apnea events and paroxysmal nocturnal dyspnea events. They have never fallen asleep in conversation, at the wheel, or at work.  They deny sleepwalking/talking.            NEUROIMAGING:     Reading Physician Reading Date Result Priority   Siri Rajan M.D.  [phone]     10/23/2024      Narrative & Impression     10/22/2024 3:56 PM     HISTORY/REASON FOR EXAM:  Memory loss     TECHNIQUE/EXAM DESCRIPTION:     T1 sagittal, T2 axial, flair coronal, T1 coronal, and diffusion-weighted axial images were obtained of the brain.     COMPARISON: None.     FINDINGS:     Gradient-echo images are normal. There is no evidence of intracranial hemorrhage.  The ventricles are normal. There are no extra axial collections.  Hippocampi are normal.  Age-related volume loss noted with prominent sulci, cisterns and ventricles. Ventricular dilatation is proportionate to the degree of cerebral volume loss.  There is no evidence of intracranial mass or mass effect. No evidence of midline shift.  There is no evidence of focal cerebral edema.  There are no extra axial collections.  Visualized intracranial arterial flow voids are within normal limits.  Bone marrow signal in the calvarium is within normal limits.  Included portions of the paranasal sinuses are within normal limits.  Included portions of the mastoid air cells are within normal limits.  Included portions of the orbits are within normal limits     IMPRESSION:        No acute intracranial process. Age-related volume loss.        Exam Ended: 10/22/24  4:53 PM Last Resulted: 10/23/24  3:37 PM       ntains abnormal data VITAMIN B12  Order: [id number]   Status: Final result       Next appt: None       Dx: Memory loss     Test Result Released: Yes (seen)       Messages: Not Seen    0 Result Notes       1 Patient Communication       Component  Ref Range & Units (hover) 6 mo ago 16 yr ago   Vitamin B12 -True Cobalamin >4000 High  557   Comment: Results obtained by dilution.   Resulting Agency M M        CRP QUANTITIVE (NON-CARDIAC)  Order: [id number]   Status: Final result       Next appt: None       Dx: Memory loss    Test Result Released: Yes (seen)       Messages: Seen    0 Result Notes       1 Patient Communication      Component  Ref Range & Units (hover) 6 mo ago   Stat C-Reactive Protein <0.30   Resulting Agency           TSH  Order: [id number] - Reflex for Order [id number]   Status: Final result       Next appt: None    Test Result Released: Yes (not seen)    0 Result Notes            Component  Ref Range & Units (hover) 10 mo ago  (5/24/24) 1 yr ago  (12/7/23) 1 yr ago  (6/22/23) 4 yr ago  (7/14/20) 6 yr ago  (2/25/19) 9 yr ago  (12/15/15) 10 yr ago  (10/28/14)   TSH 1.750 1.940 CM 1.500 CM 2.000 CM 1.340 CM 2.020 R 1.790 R   Comment: The 2011 American Thyroid Association (ARON) guidelines  recommended that the interpretation of thyroid function in  pregnancy be based on trimester specific reference ranges.    1st Trimester  0.100-2.500 mIU/L  2nd Trimester  0.200-3.000 mIU/L  3rd Trimester  0.300-3.500 mIU/L        Lipid Profile  Order: [id number] - Reflex for Order [id number]   Status: Final result       Next appt: None    Test Result Released: Yes (seen)    0 Result Notes            Component  Ref Range & Units (hover) 10 mo ago  (5/24/24) 1 yr ago  (12/7/23) 1 yr ago  (6/22/23) 2 yr ago  (6/17/22) 3 yr ago  (5/25/21) 4 yr ago  (7/14/20) 6 yr ago  (2/25/19)   Cholesterol,Tot 142 134 156 145 151 135 146   Triglycerides 55 59 62 84 79 91 87   HDL 67 71 78 74 72 65 74   LDL 64 51 66 54 63 52 55   Resulting Agency M M M M M M M        Comp Metabolic Panel  Order: [id number] - Reflex for Order [id number]   Status: Final result        Next appt: None    Test Result Released: Yes (seen)    0 Result Notes            Component  Ref Range & Units (hover) 10 mo ago  (5/24/24) 1 yr ago  (12/7/23) 1 yr ago  (6/22/23) 1 yr ago  (6/22/23) 2 yr ago  (6/17/22) 3 yr ago  (2/22/22) 3 yr ago  (9/9/21)   Sodium 141 143  140 142 140 137   Potassium 4.1 4.6  4.4 4.3 4.3 4.0   Chloride 104 108  103 105 106 101   Co2 25 25  27 25 24 25   Anion Gap 12.0 10.0  10.0 12.0 10.0 11.0   Glucose 91 88  82 97 88 83   Bun 15 13  14 12 14 18   Creatinine 0.54 0.62  0.59 0.57 0.66 0.63   Calcium 9.5 9.6  9.5 9.8 9.3 9.3   Correct Calcium 9.4 9.6 9.4       AST(SGOT) 38 35  18 16  47 High    ALT(SGPT) 27 34  20 13  57 High    Alkaline Phosphatase 82 83  75 100 High   91   Total Bilirubin 0.5 0.3  0.5 0.7  0.5   Albumin 4.1 4.0  4.1 4.5  3.9   Total Protein 6.7 6.7  6.6 6.8  6.1   Globulin 2.6 2.7  2.5 2.3  2.2   A-G Ratio 1.6 1.5  1.6 2.0  1.8   Resulting Agency M M M M M M M          Impression/Plans/Recommendations:        Impression:    A. Cognitive Impairment- mild and not amounting to a Dementia issue at this time.    There are no clinical features of an evolving psychosis nor subacute encephalopathic features (seizure type behavior, psychosis, behavioral or personality changes)  or other evidence of a rapidly evolving gait-balance/ parkinsonian disorder.    IADL(s) reviewed today at length and seem well preserved.    The characteristics are concerning for a Neurodegenerative condition such Alzheimer's Disease given the changes in memory and general forgetfulness that are slowly evolving or progressing over time.    Medication list reviewed and potential culprit is Ditropan (5 mg).    MOCA score of 27/30  (see media section for specifics).  Minor difficulty with clock drawing (hands transposed- such that at time given 11:10 am or 10 past 11> she placed the big hand on the 10 and the little hand on the 11); Cube copy slightly abnormal.   4 out of 5 words  recalled.    Alzheimer's Questionnaire score today of:    (see media for specifics).    Functional Activities Questionnaire score today of:   (see media section for specifics).    B. History of Obstructive Sleep Apnea (diagnosed in 5/2017)- not using CPAP- overview of that report added to my note.    C. Overweight- BMI of 29.7    D. History of Vitamin D Deficiency- on Vitamin D2 (Ergocalciferon)- 1.25 mg a day.    Plans:    1. Lifestyle factors discussed including the importance of blood pressure control (long term goal under 130/80, nutritional/diet considerations such as a low processed sugar (MIND type Diet).    2.   The topic of rather newly approved anti amyloid medications (Leqembi and Kinsunla) reviewed today with the assumption or with the possibility that this clinical scenario could be related to Alzheimer's Disease and  in terms of the risks of ARIA (Hemorrhage and/or Edema) and the associated additional risk(s) of accelerated brain atrophy reported in patients exposed to anti amyloid monoclonal antibody related medications.    https://pmc.ncbi.nlm.nih.gov/articles/DZC96850693/    After the above discussion of these  anti amyloid monoclonal antibody medications, the patient and accompanying family member confirmed and stated that such risks are too significant (risky) and thus the decision was or would be  to not pursue such medications.    3.  Discussed oral cognitive enhancing medication(s) today including Donepezil  (ie, the acetylcholinesterase inhibitor class)  and Memantine (ie, Namenda)  including the longer term goals of of using such  medications and their side effect profiles.     The decision after our discussion of the cognitive enhancing medications was to not proceed with such medications.    I also explained the present lack of clinical evidence based on the lack of clinical trial data for using Prevagen,Focus Factor,Neuriva or Neuriva Plus at this time.    4. Formal Neuropsychological  testing with one of our PhD Neuropsychologists reviewed  to better clarify cognitive abilities and develop a baseline of cognitive function and at this time the decision was not to pursue such testing.    5. Discussed blood biomarker testing for Amyloid Beta (42/40) and total P-Tau 217 levels. She is going to read more about this issue and may proceed to do it.    6.  Lifestyle factors and brain health topics reviewed today> I gave Betty some books to read on these topics.    7. Additional blood work to be done- Vitamin B(s)- B1,B12,B9 and Vitamin D level.    8.  I do not feel additional brain imaging such as a head CT or Brain MRI are necessary at this time nor an EEG or CSF analysis is needed.    9. Referral for sleep evaluation to determine if STEVEN is present or not based on prior concerns/results from Sleep Study done in 2017 in the [facility].    10. I asked Betty to speak with her PCP about the use of Ditropan as that has anticholinergic effects> potentially using something else to treat urge urinary incontinence as stated in her problem list.    I have performed  a history and physical exam and a directed /focused  ROS today.    Total time spent today or this patient's care was 78 minutes  and included reviewing  the diagnostic workup to date (such as labs and imaging as well as interpreting such tests relevant to this patient's neurological condition),  reviewing/obtaining separately obtained history from Betty  for today's neurological problem(s) ,counseling and educating Betty on issues related to cognition/memory and cognitive health factors and documenting  the clinical information in the EMR.    Follow up in about 6 months or so.    Radames Blakc MD  [facility].   [facility]

## 2025-04-02 NOTE — Clinical Note
REFERRAL APPROVAL NOTICE         Sent on April 2, 2025                   Betty Natarajan  [address]  [place] [address]                   Dear Ms. Natarajan,    After a careful review of the medical information and benefit coverage, [facility] has processed your referral. See below for additional details.    If applicable, you must be actively enrolled with your insurance for coverage of the authorized service. If you have any questions regarding your coverage, please contact your insurance directly.    REFERRAL INFORMATION   Referral #:  [id number]  Referred-To Department    Referred-By Provider:  Pulmonary and Sleep Medicine    Radames Black M.D.   Pulmonary[place]sleep [facility]      [address]  [address]  [place] [id number] [phone] [address], [address]  [place] [id number] [phone]    Referral Start Date:  03/31/2025  Referral End Date:   03/31/2026           SCHEDULING  If you do not already have an appointment, please call [phone] to make an appointment.   MORE INFORMATION  As a reminder, [facility] ownership has changed, meaning this location is now owned and operated by [facility]. As such, we want to clarify that our patients should expect to receive two separate bills for the services received at [facility] - one representing the [facility] facility fees as the owner of the establishment, and the other to represent the physician's services and subsequent fees. You can speak with your insurance carrier for a pricing estimate by calling the customer service number on the back of your card and ask about charges for a hospital outpatient visit.  If you do not already have a [employer] account, sign up at: [email or web address]  You can access your medical information, make appointments, see lab results, billing information,  and more.  If you have questions regarding this referral, please contact  the [facility] department at:             [phone]. Monday - Friday 7:30AM - 5:00PM.      Sincerely,  [facility]

## 2025-04-10 ENCOUNTER — OFFICE VISIT (OUTPATIENT)
Dept: SLEEP MEDICINE | Facility: MEDICAL CENTER | Age: 75
End: 2025-04-10
Payer: MEDICARE

## 2025-04-10 VITALS
BODY MASS INDEX: 28.58 KG/M2 | SYSTOLIC BLOOD PRESSURE: 114 MMHG | HEART RATE: 67 BPM | OXYGEN SATURATION: 94 % | HEIGHT: 69 IN | WEIGHT: 193 LBS | DIASTOLIC BLOOD PRESSURE: 82 MMHG | RESPIRATION RATE: 14 BRPM

## 2025-04-10 DIAGNOSIS — G47.33 OSA (OBSTRUCTIVE SLEEP APNEA): ICD-10-CM

## 2025-04-10 PROCEDURE — 99212 OFFICE O/P EST SF 10 MIN: CPT

## 2025-04-10 PROCEDURE — 99204 OFFICE O/P NEW MOD 45 MIN: CPT

## 2025-04-10 ASSESSMENT — FIBROSIS 4 INDEX: FIB4 SCORE: 1.71

## 2025-04-10 NOTE — PROGRESS NOTES
[facility] Consult Note     Date: 4/10/2025 / Time: 9:28 AM      Thank you for requesting a sleep medicine consultation on Betty Natarajan at the sleep center. Presents today with the chief complaints of memory issues. She is referred by neurology for evaluation and treatment of sleep disorder breathing.       HISTORY OF PRESENT ILLNESS:     Betty Natarajan is a 74 y.o. female with history of memory loss/MCI, STEVEN, elevated BMI, asthma, insomnia, OAB.  Betty presents to Sleep Clinic for evaluation and treatment of sleep disorder breathing.     Patient was diagnosed with STEVEN in 2017 . At that time, patient was  in the mild category of sleep apnea . During the sleep study, she was not put on PAP therapy and she did not pursue PAP therapy after the study. Her neurologist thinks that untreated sleep apnea may be contributing to her memory issues and recommended further evaluation. Patient is open to repeating a sleep study. She is open to PAP therapy if it is recommended, but she requests that she be able to try PAP therapy during the sleep study.     Patient reports that it takes about an hour to fall asleep. She is sometimes bothered by sleepiness and fatigue during the day. She reports interrupted sleep and wakes up about 4 times a night. She does contribute this to overactive bladder, so she is unsure if PAP therapy would help her wake less frequently.       As per supplemental questionnaire to be scanned or imported into chart:    [place] Sleepiness Score: 11    Sleep Schedule  Bedtime: Weekday 11PM-12AM Weekend same  Wake time: Weekday 7AM Weekend same  Sleep-onset latency: 60 min  Awakenings from sleep: 4, usually to urinate   Difficulty falling back asleep: no  Bedroom partner: no  Naps: no    DAYTIME SYMPTOMS:   Excessive daytime sleepiness: yes  Daytime fatigue: yes  Difficulty concentrating: no  Memory problems: no  Irritability:no  Work/school performance issues: no  Sleepiness with  "driving: no  Caffeine/stimulant use: Yes, How Many? 2 cups/day  Alcohol use:Yes, How Many? Socially, 1-2 drinks      SLEEP RELATED SYMPTOMS  Snoring: sometimes  Witnessed apnea or gasping/choking: No   Dry mouth or mouth breathing: sometimes   Sweating: yes  Teeth grinding/biting: no  Morning headaches: no  Refreshed Upon Awakening: sometimes     SLEEP RELATED BEHAVIORS:  Parasomnias (walking, talking, eating, violence): No   Leg kicking: no  Restless legs - \"urge to move\": no  Nightmares: no Recurrent: No   Dream enactment: No      NARCOLEPSY:  Cataplexy: No   Sleep paralysis: No   Sleep attacks: No   Hypnagogic/hypnopompic hallucinations: No       MEDICAL HISTORY  Past Medical History:   Diagnosis Date    Asthma     Bronchitis     Depression     History of kidney stones 7/25/2017    Influenza     Pap smear     Positive PPD     Tonsillitis         SURGICAL HISTORY  Past Surgical History:   Procedure Laterality Date    COLONOSCOPY  2/17/2016    internal hemorrhoids    HEMORRHOIDECTOMY  5/08    COLONOSCOPY  2005    normal    GASTRIC BYPASS LAPAROSCOPIC  2001    NODULE EXCISION      Performed by EDELMIRA MONTE at [facility]    MO REMV 2ND CATARACT,CORN-SCLER SECTN      TONSILLECTOMY      US-NEEDLE CORE BX-BREAST PANEL          FAMILY HISTORY  Family History   Problem Relation Age of Onset    Diabetes Father     Hypertension Father     Genitourinary () Problems Father         renal failure on dialysis    Cancer Brother         brain tumor    Heart Attack Brother     No Known Problems Brother     No Known Problems Brother     Breast Cancer Maternal Aunt     Cancer Maternal Aunt     Breast Cancer Daughter     Sleep Apnea Neg Hx        SOCIAL HISTORY  Social History     Socioeconomic History    Marital status:     Highest education level: Associate degree: occupational, technical, or vocational program   Occupational History    Occupation: retired     Employer: RETIRED   Tobacco Use    Smoking " status: Never    Smokeless tobacco: Never   Vaping Use    Vaping status: Never Used   Substance and Sexual Activity    Alcohol use: Yes     Alcohol/week: 0.0 oz     Comment: occasional    Drug use: No    Sexual activity: Not Currently     Social Drivers of Health     Financial Resource Strain: Low Risk  (8/26/2024)    Overall Financial Resource Strain (CARDIA)     Difficulty of Paying Living Expenses: Not hard at all   Food Insecurity: No Food Insecurity (8/26/2024)    Hunger Vital Sign     Worried About Running Out of Food in the Last Year: Never true     Ran Out of Food in the Last Year: Never true   Transportation Needs: No Transportation Needs (8/26/2024)    PRAPARE - Transportation     Lack of Transportation (Medical): No     Lack of Transportation (Non-Medical): No   Physical Activity: Insufficiently Active (8/26/2024)    Exercise Vital Sign     Days of Exercise per Week: 3 days     Minutes of Exercise per Session: 30 min   Stress: No Stress Concern Present (8/26/2024)    [employer] [employer] - Occupational Stress Questionnaire     Feeling of Stress : Only a little   Social Connections: Moderately Integrated (8/26/2024)    Social Connection and Isolation Panel [NHANES]     Frequency of Communication with Friends and Family: More than three times a week     Frequency of Social Gatherings with Friends and Family: More than three times a week     Attends Tenri[employer] Services: More than 4 times per year     Active Member of Clubs or Organizations: Yes     Attends Club or Organization Meetings: More than 4 times per year     Marital Status:    Housing Stability: Low Risk  (8/26/2024)    Housing Stability Vital Sign     Unable to Pay for Housing in the Last Year: No     Number of Times Moved in the Last Year: 0     Homeless in the Last Year: No        Occupation: retired     CURRENT MEDICATIONS  Current Outpatient Medications   Medication Sig    vitamin D2, Ergocalciferol, (DRISDOL) 1.25 MG  "(62239 UT) Cap capsule Take  by mouth every 7 days.    folic acid (FOLVITE) 400 MCG tablet Take 1 Tablet by mouth every day.    vitamin E (VITAMIN SUPPLEMENT E-1000) 1000 units (450 mg) Cap VEGETABLE POWDER SUPPLEMENT: Dissolve and drink as directed  for supplement.    acetaminophen (TYLENOL) 325 MG Tab Take 1 Tablet by mouth.    Calcium Citrate-Vitamin D 315-250 MG-UNIT Tab Take 1 Tablet by mouth 2 times a day.    Multiple Vitamin (MULTI-VITAMIN) Tab Take 1 Tablet by mouth every day.    albuterol 108 (90 Base) MCG/ACT Aero Soln inhalation aerosol Inhale 2 Puffs by mouth every 6 hours as needed for Shortness of Breath.    Probiotic Product (PROBIOTIC ACIDOPHILUS) Cap Take  by mouth every day.    Mometasone Furo-Formoterol Fum (DULERA) 200-5 MCG/ACT Aerosol Inhale  by mouth.    alendronate (FOSAMAX) 35 MG tablet Take 35 mg by mouth every 7 days. (Patient not taking: Reported on 4/10/2025)    oxybutynin (DITROPAN) 5 MG Tab Take 5 mg by mouth every day. (Patient not taking: Reported on 4/10/2025)       REVIEW OF SYSTEMS  Constitutional: Denies fevers, Denies weight changes  Ears/Nose/Throat/Mouth: Denies nasal congestion or sore throat   Cardiovascular: Denies chest pain  Respiratory: Denies shortness of breath, Denies cough  Gastrointestinal/Hepatic: Denies nausea, vomiting  Sleep: see HPI    Physical Examination:  Vitals/ General Appearance:   Weight/BMI: Body mass index is 28.5 kg/m².  /82 (BP Location: Left arm, Patient Position: Sitting, BP Cuff Size: Adult)   Pulse 67   Resp 14   Ht 1.753 m (5' 9\")   Wt 87.5 kg (193 lb)   SpO2 94%   Vitals:    04/10/25 0921   BP: 114/82   BP Location: Left arm   Patient Position: Sitting   BP Cuff Size: Adult   Pulse: 67   Resp: 14   SpO2: 94%   Weight: 87.5 kg (193 lb)   Height: 1.753 m (5' 9\")       Pt. is alert and oriented to time, place and person. Cooperative and in no apparent distress.     Constitutional: Alert, no distress, well-groomed.  Skin: No rashes in " visible areas.  Eye: Round. Conjunctiva clear, lids normal. No icterus.   ENT EXAM  Nasal septum deviation: No   Nasal turbinate hypertrophy: Left: Grade 1   Right: Grade 1  Nasal erythema: No   Oropharyngeal erythema No   Hard palate narrow: No   Hard palate high: No   Soft palate/uvula (Mallampati score): 3  Tongue Scalloping: No   Retrognathia: No   Micrognathia: No   Cardiovascular:normal S1 and S2 heart sounds, regular rate and rhythm  Pulmonary:Normal breath sounds, Clear to auscultation  Neurologic:Muscle tone normal, Awake, alert and oriented x 3  Extremities: No clubbing, cyanosis, or edema     Bicarb:   Lab Results   Component Value Date/Time    CO2 25 05/24/2024 0947    CO2 25 12/07/2023 1037    CO2 27 06/22/2023 1203     TSH:   Lab Results   Component Value Date/Time    TSHULTRASEN 1.750 05/24/2024 0947     CREATININE:   Lab Results   Component Value Date/Time    CREATININE 0.54 05/24/2024 0947     VIT D:   Lab Results   Component Value Date/Time    25HYDROXY 46 12/07/2023 1037     H/H:  Lab Results   Component Value Date/Time    HEMOGLOBIN 13.6 05/24/2024 09:47 AM       ASSESSMENT AND PLAN  1. STEVEN (obstructive sleep apnea)  - [place] Sleepiness Scale: 11  - Patient reports snoring , excessive daytime sleepiness, unrefreshed upon awakening, previous diagnosis of STEVEN, and memory issues.  - Patient has risk factors for STEVEN including elevated BMI.   - The pathophysiology of STEVEN and the increased risk of cardiovascular morbidity from untreated STEVEN has been discussed with the patient.   - We have discussed diagnostic options including in-laboratory, attended polysomnography and home sleep testing. We have also discussed treatment options including airway pressurization, weight management, dental appliances, and behavioral modification.     Plan:  -  Order placed for PSG   - Follow up about 2 weeks after sleep study to discuss results and treatment options moving forward   - Advised to reach out via [employer] or  by phone with any questions or concerns.   - The patient is urged to avoid supine sleep, weight gain and alcoholic beverages since all of these can worsen STEVEN. If the patient feels sleepy while driving, advised must pull over for a break/nap, rather than persist on the road, in the interest of patient's own safety and that of others on the road.    2.  Regarding treatment of other past medical problems and general health maintenance,  patient is urged to follow up with PCP.      Please note portions of this record was created using voice recognition software. I have made every reasonable attempt to correct obvious errors, but I expect that there are errors of grammar and possibly content I did not discover before finalizing the note.

## 2025-06-15 ENCOUNTER — APPOINTMENT (OUTPATIENT)
Dept: SLEEP MEDICINE | Facility: MEDICAL CENTER | Age: 75
End: 2025-06-15
Payer: MEDICARE

## 2025-06-15 DIAGNOSIS — G47.33 OSA (OBSTRUCTIVE SLEEP APNEA): ICD-10-CM

## 2025-06-15 PROCEDURE — 95810 POLYSOM 6/> YRS 4/> PARAM: CPT | Mod: 26 | Performed by: STUDENT IN AN ORGANIZED HEALTH CARE EDUCATION/TRAINING PROGRAM

## 2025-06-16 NOTE — PROCEDURES
Patient: CHERYL JONES  ID: [id number] Date: 6/15/2025    MONTAGE: Standard  STUDY TYPE: Split Night  RECORDING TECHNIQUE:   After the scalp was prepared, gold plated electrodes were applied to the scalp according to the International 10-20 System. EEG (electroencephalogram) was continuously monitored from the O1-M2, O2-M1, C3-M2, C4-M1, F3-M2, and F4-M1. EOGs (electrooculograms) were monitored by electrodes placed at the left and right outer canthi. Chin EMG (electromyogram) was monitored by electrodes placed on the mentalis and sub-mentalis muscles. Nasal and oral airflow were monitored using a triple port thermocouple as well as oronasal pressure transducer. Respiratory effort was measured by inductive plethysmography technology employing abdominal and thoracic belts. Blood oxygen saturation and pulse were monitored by pulse oximetry. Heart rhythm was monitored by surface electrocardiogram. Leg EMG was studied using surface electrodes placed on left and right anterior tibialis. A microphone was used to monitor tracheal sounds and snoring. Body position was monitored and documented by technician observation.   SCORING CRITERIA:   A modification of the AASM manual for scoring of sleep and associated events was used. Obstructive apneas were scored by cessation of airflow for at least 10 seconds with continuing respiratory effort. Central apneas were scored by cessation of airflow for at least 10 seconds with no respiratory effort. Hypopneas were scored by a 30% or more reduction in airflow for at least 10 seconds accompanied by arterial oxygen desaturation of 3% or an arousal. For CMS (Medicare) patients, per AASM rule 1B, hypopneas are scored by 30% with mild reduction in airflow for at least 10 seconds accompanied by arterial saturation decreased at 4%.    DIAGNOSTIC  Study start time was 08:52:50 PM. Diagnostic recording time was 215 minutes with a total sleep time of 121 minutes resulting in a sleep efficiency of  56.51%%. Sleep latency from the start of the study was 32 minutes and the latency from sleep to REM was 110 minutes. In total,57 arousals were scored for an arousal index of 28.1.  Respiratory:  There were a total of 0 apneas consisting of 0 obstructive apneas, 0 mixed apneas, and 0 central apneas. A total of 30 hypopneas were scored. The apnea index was 0.00 per hour and the hypopnea index was 14.81 per hour resulting in an overall 4% AHI of 14.81. AHI during REM was 12.0 and AHI while supine was 40.00.  Oximetry:  There was a mean oxygen saturation of 92.0%. The minimum oxygen saturation during NREM sleep was 81.0% and in REM was 87.0%. Time spent during sleep with oxygen saturations <88% was 9.6 minutes.   Cardiac:  The highest heart rate seen while awake was 89 BPM while the highest heart rate during sleep was 78 BPM with an average sleeping heart rate of 60 BPM.  Limb Movements:  There were a total of 104 PLMs during sleep, which resulted in a PLM index of 51.4. There were 15 PLMs associated with arousals which resulted in a PLMS arousal index of 7.4.    TREATMENT:  Treatment recording time was 5h 35.5m (335 minutes) with a total sleep time of 4h 15.0m (255 minutes) resulting in a sleep efficiency of 76.0%. Sleep latency from the start of treatment was 10 minutes and REM latency from sleep onset was 1h 43.5m. The patient had 71 arousals in total for an arousal index of 16.7.  Respiratory:   There were 4 apneas in total consisting of 1 obstructive apneas, 3 central apneas, and 0 mixed apneas for an apnea index of 0.94. The patient had 15 hypopneas in total, which resulted in a hypopnea index of 3.53. The overall 4% AHI was 4.47, with a REM AHI of 1.64, and a supine AHI of 7.11.   Oximetry:  The mean SaO2 during treatment was 93.0%. The minimum oxygen saturation in NREM was 87.0 % and in REM was 88.0%. Patient spent 0.7 minutes of TST with SaO2 <88%.  Cardiac:  The highest heart rate during sleep was 78 BPM with  an average sleeping heart rate of 56BPM.  Limb Movements:  There were a total of 27 PLMS during titration sleep time that resulted in an index of 6.4. There were 8 PLMS associated with arousals. This resulted in a PLM arousal index of 1.9.  Titration:   CPAP was tried from 5 to 14  This was a fully attended sleep study. This test was technically adequate. This patient was titrated on CPAP starting at 5 cm of water pressure. Patient was titrated up to 14 cm of water pressure. Patient did best at 10 cm of water pressure. Patient spent 47 minutes at that pressure and the AHI was 0 which is considered controlled obstructive sleep apnea.     Impression:  1.  Mild obstructive sleep apnea with an overall 4% AHI of 10.8 events an hour  2.  Respiratory events worse in supine position with a supine AHI of 40 events an hour  3.  Mild nocturnal hypoxia during diagnostic portion likely secondary to uncontrolled sleep apnea at 9.6 minutes at or below 88% saturation  4.  Patient was placed on CPAP therapy which did improve respiratory events  5.  Oxygen saturations normalized with control of sleep apnea  6.  Supine REM sleep was seen during study    Recommendations:  I recommend auto CPAP 7-12 cmH2O.  Patient used Small F20 mask during night of study.    I also recommend 30 day compliance download to assess the efficacy to the recommended pressure, measure leak, apnea hypopnea index and compliance for further outpatient monitoring and management of PAP therapy. In some cases alternative treatment options may be proven effective in resolving sleep apnea. These options include upper airway surgery, the use of a dental orthotic, weight loss, or positional therapy. Clinical correlation is required. In general patients with sleep apnea are advised to avoid alcohol, sedatives and not to operate a motor vehicle while drowsy.  Untreated sleep apnea increases the risk for cardiovascular and neurovascular disease.

## 2025-06-30 ENCOUNTER — OFFICE VISIT (OUTPATIENT)
Dept: SLEEP MEDICINE | Facility: MEDICAL CENTER | Age: 75
End: 2025-06-30
Payer: MEDICARE

## 2025-06-30 VITALS
OXYGEN SATURATION: 94 % | SYSTOLIC BLOOD PRESSURE: 114 MMHG | DIASTOLIC BLOOD PRESSURE: 74 MMHG | HEIGHT: 69 IN | WEIGHT: 195 LBS | RESPIRATION RATE: 14 BRPM | BODY MASS INDEX: 28.88 KG/M2 | HEART RATE: 64 BPM

## 2025-06-30 DIAGNOSIS — G47.33 OSA (OBSTRUCTIVE SLEEP APNEA): Primary | ICD-10-CM

## 2025-06-30 DIAGNOSIS — G47.34 NOCTURNAL HYPOXIA: ICD-10-CM

## 2025-06-30 PROCEDURE — 99213 OFFICE O/P EST LOW 20 MIN: CPT

## 2025-06-30 ASSESSMENT — FIBROSIS 4 INDEX: FIB4 SCORE: 1.71

## 2025-06-30 NOTE — PROGRESS NOTES
[facility] Follow-up Visit    CC:   Chief Complaint   Patient presents with    Follow-Up     Last Office Visit 4/1/2025 with Binta     Results     Study Complete on 6/15/2025          HPI:  Betty Natarajan is a 74 y.o.female present today to discuss sleep study results. Patient was seen by sleep medicine 4/1/25 by myself for initial consultation.  Patient was originally diagnosed with STVEEN in 2017 but was told it was mild and did not initiate treatment.  Patient came back in at the request of her neurologist who felt that untreated sleep apnea may be contributing to memory issues.  Patient completed in lab split night study which showed  AHI 14.8 with O2 < 89% for 9.6 minutes. Patient responded well to CPAP. Recommendations were made for autoCPAP 7-14.  Patient asked if she can use a different mask and she is during the sleep study requesting a small nasal style mask.      Sleep History:  4/25/17 - PSG, AHI 10.6 (supine AHI 25.5), geri O2 80%. Oxygen saturations were less than or = 89% for 18.1 minutes of sleep time.  6/15/25 - PSG split night, 4% AHI 14.8,  oxygen saturations <88% was 9.6 minutes. CPAP was tried from 5 to 14. Oxygen saturations normalized with control of sleep apnea. Rec: auto CPAP 7-12 cmH2O    Patient Active Problem List    Diagnosis Date Noted    Mild cognitive impairment 03/31/2025    Overweight (BMI 25.0-29.9) 03/31/2025    COVID-19 01/04/2022    Primary insomnia 12/21/2021    Preop examination 09/07/2021    Chronic pain of right hip 09/07/2021    Gastroesophageal reflux disease without esophagitis 05/25/2021    Memory loss 07/31/2019    Age-related osteoporosis without current pathological fracture 02/02/2018    Urge incontinence of urine 07/25/2017    STEVEN (obstructive sleep apnea) 05/05/2017    Obesity (BMI 30-39.9) 12/14/2016    Osteoporosis 12/15/2015    Mild intermittent asthma without complication 02/01/2013    Vitamin D deficiency 08/04/2010       Past Medical History[1]      Past Surgical History[2]    Family History   Problem Relation Age of Onset    Diabetes Father     Hypertension Father     Genitourinary () Problems Father         renal failure on dialysis    Cancer Brother         brain tumor    Heart Attack Brother     No Known Problems Brother     No Known Problems Brother     Breast Cancer Maternal Aunt     Cancer Maternal Aunt     Breast Cancer Daughter     Sleep Apnea Neg Hx        Social History     Socioeconomic History    Marital status:      Spouse name: Not on file    Number of children: Not on file    Years of education: Not on file    Highest education level: Associate degree: occupational, technical, or vocational program   Occupational History    Occupation: retired     Employer: RETIRED   Tobacco Use    Smoking status: Never    Smokeless tobacco: Never   Vaping Use    Vaping status: Never Used   Substance and Sexual Activity    Alcohol use: Yes     Alcohol/week: 0.0 oz     Comment: occasional    Drug use: No    Sexual activity: Not Currently   Other Topics Concern    Not on file   Social History Narrative    Not on file     Social Drivers of Health     Financial Resource Strain: Low Risk  (8/26/2024)    Overall Financial Resource Strain (CARDIA)     Difficulty of Paying Living Expenses: Not hard at all   Food Insecurity: No Food Insecurity (8/26/2024)    Hunger Vital Sign     Worried About Running Out of Food in the Last Year: Never true     Ran Out of Food in the Last Year: Never true   Transportation Needs: No Transportation Needs (8/26/2024)    PRAPARE - Transportation     Lack of Transportation (Medical): No     Lack of Transportation (Non-Medical): No   Physical Activity: Insufficiently Active (8/26/2024)    Exercise Vital Sign     Days of Exercise per Week: 3 days     Minutes of Exercise per Session: 30 min   Stress: No Stress Concern Present (8/26/2024)    [employer] [place] [employer] - Occupational Stress Questionnaire     Feeling of  "Stress : Only a little   Social Connections: Moderately Integrated (8/26/2024)    Social Connection and Isolation Panel [NHANES]     Frequency of Communication with Friends and Family: More than three times a week     Frequency of Social Gatherings with Friends and Family: More than three times a week     Attends Voodoo Services: More than 4 times per year     Active Member of Clubs or Organizations: Yes     Attends Club or Organization Meetings: More than 4 times per year     Marital Status:    Intimate Partner Violence: Not on file   Housing Stability: Low Risk  (8/26/2024)    Housing Stability Vital Sign     Unable to Pay for Housing in the Last Year: No     Number of Times Moved in the Last Year: 0     Homeless in the Last Year: No       Current Medications[3]     ALLERGIES: Penicillins    ROS  Constitutional: Denies fevers, Denies weight changes  Ears/Nose/Throat/Mouth: Denies nasal congestion or sore throat   Cardiovascular: Denies chest pain  Respiratory: Denies shortness of breath, Denies cough  Gastrointestinal/Hepatic: Denies nausea, vomiting  Sleep: see HPI      PHYSICAL EXAM  /74 (BP Location: Left arm, Patient Position: Sitting, BP Cuff Size: Adult)   Pulse 64   Resp 14   Ht 1.753 m (5' 9\") Comment: per patient  Wt 88.5 kg (195 lb) Comment: per patient  LMP 05/17/2007   SpO2 94%   BMI 28.80 kg/m²   Constitutional: Alert, no distress, well-groomed.  Skin: Warm, dry, good turgor, no rashes in visible areas.  Eye: Equal, round and reactive, conjunctiva clear, lids normal.  ENMT: Lips without lesions, good dentition, moist mucous membranes.  Neck: Trachea midline, no masses, no thyromegaly.  Respiratory: Unlabored respiratory effort, no cough.  MSK: Normal gait, moves all extremities.  Neuro: Grossly non-focal.   Psych: Alert and oriented x3, normal affect and mood.      Medical Decision Making   Assessment and Plan  The medical record was reviewed.    Obstructive sleep " apnea  Nocturnal hypoxia   - Diagnostic and titration nocturnal polysomnogram's  reviewed. Based on results, it is recommended that patient start autoCPAP. Patient is agreeable.  - Patients with STEVEN are at increased risk of cardiovascular disease including coronary artery disease, systemic arterial hypertension, pulmonary arterial hypertension, cardiac arrythmias, and stroke. Positive airway pressure will favorably impact many of the adverse conditions and effects provoked by STEVEN. Avoid driving a motor vehicle when drowsy  - Order placed for mask and supplies and new machine   - Clean equipment frequently, and get new mask and supplies as allowed by insurance and DME. Advised to let DME company know in the first 30 days if any issues with mask fit arise so that new mask can be tried.   - Discussed compliance requirements for insurance purposes as well as ultimate clinical goal of wearing and tolerating PAP device nightly for full night of sleep to achieve optimal symptomatic and prophylactic benefit.   - Advised to reach out via [employer] with questions       Return in about 3 months (around 9/30/2025) for initial compliance .   - Recommend an earlier appointment, if significant treatment barriers develop.  - Patient to follow up with the appropriate healthcare practitioners for all other medical problems and issues.    Please note portions of this record was created using voice recognition software. I have made every reasonable attempt to correct obvious errors, but I expect that there are errors of grammar and possibly content I did not discover before finalizing the note.           [1]   Past Medical History:  Diagnosis Date    Asthma     Bronchitis     Depression     History of kidney stones 7/25/2017    Influenza     Pap smear     Positive PPD     Tonsillitis    [2]   Past Surgical History:  Procedure Laterality Date    COLONOSCOPY  2/17/2016    internal hemorrhoids    HEMORRHOIDECTOMY  5/08    COLONOSCOPY  2005     normal    GASTRIC BYPASS LAPAROSCOPIC  2001    NODULE EXCISION      Performed by EDELMIRA MONTE at SURGERY [facility] ORS    MS REMV 2ND CATARACT,CORN-SCLER SECTN      TONSILLECTOMY      US-NEEDLE CORE BX-BREAST PANEL     [3]   Current Outpatient Medications   Medication Sig Dispense Refill    vitamin D2, Ergocalciferol, (DRISDOL) 1.25 MG (75731 UT) Cap capsule Take  by mouth every 7 days.      folic acid (FOLVITE) 400 MCG tablet Take 1 Tablet by mouth every day. 90 Tablet 1    vitamin E (VITAMIN SUPPLEMENT E-1000) 1000 units (450 mg) Cap VEGETABLE POWDER SUPPLEMENT: Dissolve and drink as directed  for supplement.      acetaminophen (TYLENOL) 325 MG Tab Take 1 Tablet by mouth.      Calcium Citrate-Vitamin D 315-250 MG-UNIT Tab Take 1 Tablet by mouth 2 times a day.      Multiple Vitamin (MULTI-VITAMIN) Tab Take 1 Tablet by mouth every day.      albuterol 108 (90 Base) MCG/ACT Aero Soln inhalation aerosol Inhale 2 Puffs by mouth every 6 hours as needed for Shortness of Breath.      Probiotic Product (PROBIOTIC ACIDOPHILUS) Cap Take  by mouth every day.      Mometasone Furo-Formoterol Fum (DULERA) 200-5 MCG/ACT Aerosol Inhale  by mouth.      alendronate (FOSAMAX) 35 MG tablet Take 35 mg by mouth every 7 days. (Patient not taking: Reported on 6/30/2025)      oxybutynin (DITROPAN) 5 MG Tab Take 5 mg by mouth every day. (Patient not taking: Reported on 6/30/2025)       No current facility-administered medications for this visit.